# Patient Record
(demographics unavailable — no encounter records)

---

## 2024-10-18 NOTE — BEGINNING OF VISIT
[0] : 2) Feeling down, depressed, or hopeless: Not at all (0) [PHQ-2 Negative] : PHQ-2 Negative [Pain Scale: ___] : On a scale of 1-10, today the patient's pain is a(n) [unfilled]. [Never] : Never [With Patient/Caregiver] : with Patient/Caregiver

## 2024-10-24 NOTE — RESULTS/DATA
[FreeTextEntry1] : -PET/CT 10/4/19:  Right upper lobe mass, mediastinal and hilar lymph nodes appear significantly smaller and less intense than prior PET/CT from March 2019 and essentially unchanged from CT from June 2019.  There is a 5 mm right middle lobe nodule appearing more prominent than the prior study.  -CT Head 11/26/19:  No acute intracranial hemorrhage.  Vasogenic edema in the right frontoparietal region in area of known metastasis as seen on MR 10/1/2019.   -MRI Brain 11/27/19:  Enhancing lesion in the right frontal operculum just medial to the sylvian fissure is larger when compared to 10/1/2019 but is significantly smaller compared with 3/13/2019 and may represent neoplasm versus post therapy changes. No change tiny enhancing nodule left internal auditory canal.   -CT C/A/P 1/7/20:  Spiculated appearing mass in the right upper lobe, consistent with patient's known lung cancer. Comparison is made to a prior CT scan of the abdomen and pelvis which was performed on 2/18/2019. The sclerotic lesions in the left iliac bone were not seen on the prior exam. The sclerotic lesion in the right sacrum is also new. The lesion in the right iliac bone was seen previously.  The sclerotic lesion in the T11 vertebral body has significantly increased in size compared to the prior exam. These findings are concerning for progression of osseous metastasis.   -PET/CT 1/31/20:  Abnormal FDG-PET/CT scan.  1. Mildly FDG-avid spiculated right upper lobe lung mass is decreased in size and metabolism as compared to PET/CT dated 10/4/2019, and is not significantly changed as compared to CT dated 1/7/2020.  2. Resolution of small, mildly FDG-avid right paratracheal lymph node.  3. Non-FDG-avid sclerotic lesions, unchanged as compared to prior PET/CT, and interval diagnostic CT, are compatible with treated bone metastases.   -MRI Brain 2/27/20:  Similar-appearing enhancing right subinsular and right frontal opercular  lesions as detailed in the body the report. Decreased edema surrounding the larger right subinsular lesion. No abnormal leptomeningeal enhancement   -PET/CT 5/4/20:  Abnormal FDG-PET/CT scan.  1. Mildly FDG-avid spiculated right upper lobe lung mass is unchanged on CT, and minimally increased in metabolism as compared to prior study dated 1/31/2020. Etiology is indeterminate. Small focus of residual disease is not excluded.  2. New small hypermetabolic right perihilar and right paratracheal lymph nodes are nonspecific.  3. Non-FDG-avid sclerotic lesions, unchanged, compatible with treated bone metastases.   -MRI Brain 5/27/20:  Unchanged size of enhancing lesion in the right subinsular region with increased surrounding vasogenic edema. Continued close interval follow-up is recommended.   -MRI Brain 7/30/20:  Abnormal enhancing lesion with surrounding edema is again identified though decreased size and surrounding edema seen when compared prior exam. This finding could be compatible with response to therapy though continued close interval follow-up until resolution is recommended.   -PET/CT 8/10/20:  Compared to FDG-PET/CT scan dated 5/4/2020:  1. Mildly FDG-avid spiculated right upper lobe lung mass is unchanged in size and metabolism.  2. Nonspecific small FDG-avid mediastinal and bilateral hilar lymph nodes are unchanged or decreased in metabolism.  3. New approximately symmetric FDG activity in bilateral adrenal glands, without new corresponding abnormalities on CT, may be physiologic.  4. Non-FDG-avid sclerotic lesions, unchanged, compatible with treated bone metastases.   -CT Head 10/26/20:  No acute intracranial hemorrhage, mass effect, or evidence of acute vascular territorial infarction.  Previously described right frontal subinsular lesion is not well visualized on this examination.  More sensitive evaluation for intracranial metastatic disease, with contrast-enhanced brain MRI may be obtained, as clinically warranted, if no contraindications exist.  -MRI Brain 10/30/20:  Decreasing enhancement and surrounding FLAIR signal abnormality along the inferior right frontal lobe consistent with response to treatment. No new lesions are identified.  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    -PET/CT 11/13/20:  Compared to FDG-PET/CT scan dated 8/10/2020: 1. Mildly FDG-avid spiculated right upper lobe nodule is unchanged in size and metabolism. 2. Nonspecific small FDG-avid mediastinal and bilateral hilar lymph nodes, unchanged. 3. Mild, symmetric FDG activity in bilateral adrenal glands, without corresponding abnormalities on CT, unchanged likely physiologic. 4. New FDG-avid fractures, posterior aspect right 11th and 12th ribs. Correlate clinically for history of trauma. Few non-FDG-avid sclerotic lesions, unchanged, compatible with treated bone metastases.  -Abdominal U/S 12/4/20:  Etiology of patient's elevated LFTs is not elucidated on this study.  No hydronephrosis.  -PET/CT 2/12/21:  Compared to FDG-PET/CT scan dated 11/13/2020: 1. Mildly FDG-avid spiculated right upper lobe nodule is unchanged in size and metabolism, compatible with treated disease. 2. Few new small FDG-avid right axillary lymph nodes are nonspecific, probably reactive. Further evaluation may be performed with ultrasound. Nonspecific small FDG-avid mediastinal and bilateral hilar lymph nodes, unchanged in number and avidity. Right paratracheal node demonstrates new calcification. 3. Probable physiologic FDG activity, bilateral adrenal glands, unchanged. 4. Mild FDG activity associated with fractures in right 11th and 12th ribs, decreased as compared to prior study. Few non-FDG-avid sclerotic lesions, unchanged, compatible with treated bone metastases.  -MRI Brain 2/25/21:  Small focus of enhancement in the right frontal lobe without significant interval change from the most recent exam compatible with a treated metastasis.  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  -Pet/CT 6/21/21:  Compared to FDG-PET/CT scan dated 2/12/2021: 1. Mildly FDG avid spiculated right upper lobe nodule is unchanged in size and metabolism, compatible with treated disease. 2. Nonspecific new mild FDG avidity of bilateral level Ib cervical lymph nodes which have minimally increased in size. Please correlate clinically and with ultrasound for further evaluation as indicated. 3. Interval decrease in size and FDG avidity of nonspecific right axillary lymph nodes. Nonspecific small FDG avid mediastinal and bilateral hilar lymph nodes, unchanged in number and avidity. 4. Probable physiologic FDG activity, bilateral adrenal glands, not significantly changed. 5. Interval further decreased FDG activity associated with fractures in the right 11th-12th ribs. A few non-FDG avid sclerotic lesions, unchanged, compatible with treated bone metastasis. 6. Unchanged mild gastric hypermetabolism. Please correlate clinically for gastritis.  -EKG 6/25/21:  NSR with occasional PVC's   -MRI Brain 7/1/21: No new lesions are identified. Stable appearance of a previously treated metastatic lesion as described. Stable 3 mm enhancing lesion in the left internal auditory canal which likely represents a vestibular schwannoma.  -MRI C-spine 8/2/2021: Degenerative changes of the cervical spine with moderate central canal stenosis at C5-6.  No definite cord signal abnormality.  No abnormal enhancement or mass lesion seen.  -PET/CT 10/5/21:  Compared to FDG-PET/CT scan 6/21/2021: Findings suggestive of progression of disease. 1. Increased FDG avidity and unchanged size of spiculated right upper lobe pulmonary nodule. 2. New or increased FDG avid mediastinal and right hilar lymph nodes. 3. New FDG avidity associated with several unchanged small lytic and small sclerotic lesions in the pelvic bones. 4. Unchanged mild symmetrical FDG activity in bilateral adrenal glands, probably physiologic. 5. Unchanged mild gastric hypermetabolism, probably inflammatory. Please correlate clinically. 6. Unchanged nonspecific, mild fairly symmetric prominent FDG activity in the tonsillar regions, possibly inflammatory. Please correlate with direct visualization. Unchanged mildly FDG avid bilateral level Ib cervical lymph nodes, possibly reactive.  -MRI Brain 10/5/21:  There are 2 small enhancing lesions which are new since the prior exam of 7/1/2021 suspicious for metastasis. A 5 mm focus is in the right frontal lobe along the motor strip and a 3 mm focus is in the left frontal lobe in the subcortical white matter of the precentral gyrus.  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  -US Pelvis 11/3/21:  Nodular echogenic focus within the endometrium suspicious for polyp.  Unremarkable appearance of the ovaries  -[facility] 11/19/21:  No alterations identified.    -MRI Brain 12/5/21:  Abnormal enhancing lesions are again seen and slightly small in size which could be due to response to therapy. Continued close interval follow-up is recommended.  -CT Chest Angio 12/7/21 (COMPARED TO CT 1/7/20 AND NOT INTERVAL PET/CT'S):  No pulmonary embolus.  Right upper lobe mass similar to 01/07/2020.  Question of new metastasis to the right hilum.  Apparent skeletal metastases unchanged  -CT C/A/P 1/26/22:  Decreased size of a neoplasm in the right upper lobe.  Stable bone metastases. No finding to suggest new or progressive disease.  -Nuc Med Bone Scan 1/26/22:  Bone scan demonstrates:  No scan abnormality corresponding to sclerotic foci in the spine and pelvis.  FDG avid foci in the pelvis seen on prior PET/CT are not identified.  Degenerative disease in the major joints.  -MRI Brain 2/15/22:  Resolution of previously seen tiny metastatic foci within the precentral gyri.  Small enhancing focus within the right frontal operculum compatible with the sequelae of a treated metastasis, unchanged from the prior exams.  Stable left intracanalicular vestibular schwannoma.  -CT C/A/P 4/22/22:  Stable examination when compared to 1/26/2022, in particular stable spiculated mass in the right upper lobe at 1.5 cm.  Non specific 6 mm right lower paratracheal lymph node, attention on follow-up recommended.  Stable bony lesions concerning for metastatic disease.  No new metastatic disease.  -MRI Brain 5/14/22:   1. No evidence of recurrent metastatic disease. 2. A punctate enhancing focus and associated susceptibility artifact in the ventral posterior aspect of the right frontal lobe, compatible with sequela of treated metastasis, is stable. 3. 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  -CT C/A/P 7/22/22:  New patchy groundglass opacity in the right lower lobe, likely infectious/inflammatory. Enlarging indeterminate mediastinal and hilar lymphadenopathy. Follow-up chest CT in 3 months or as per clinical protocol.  Stable spiculated right apical nodule and osteosclerotic metastases.  No new disease in the abdomen or pelvis.  -[facility] 7/29/22:  No alterations identified.    -MRI Brain 8/19/22:  No evidence of intracranial metastatic disease. Resolution of previously seen precentral gyrus subcentimeter enhancing metastases. A focal area of T2 hyperintensity is seen in the region of the previously seen left-sided enhancing lesion likely representing the sequelae of prior radiation therapy. Continued follow-up is advised.  Stable left internal auditory canal vestibular schwannoma.  Stable area of enhancement with associated susceptibility artifact within the posterior right frontal lobe compatible with sequela of treated metastasis.  -CT C/A/P 10/21/22:  Since 7/22/2022:  Stable right upper lobe spiculated right upper lobe nodule.  Stable mildly enlarged hilar lymph nodes (although increased compared to 4/22/2022).  Stable sclerotic bone lesions.  No finding to suggest new or progressive disease.  -MRI Brain 11/23/22:   * STABLE VAGUE ENHANCEMENT, MILD INCREASED T2 SIGNAL, AND HEMOSIDERIN DEPOSITION POSTERIOR SUPERIOR ASPECT RIGHT INSULA AT SITE OF PRIOR TREATED METASTASIS. 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 * NO NEW ENHANCING LESIONS IDENTIFIED.  -CT C/A/P 1/20/23:  Ill-defined nodular opacity in the right upper lobe is unchanged when compared to previous exam.  No significant change in the sclerotic focus in the T11 vertebral body when compared to previous exam.  -MRI C/T-Spine 3/23/23:  No findings of osseous malignancy. No abnormal cord enhancement or cord signal abnormality.  -MRI L-Spine 3/23/23:  No findings of osseous or leptomeningeal malignancy.  -MRI Brain 3/23/23:   Similar minimal curvilinear enhancement along the posterior insula (35-92). Similar minimal associated T2 FLAIR signal abnormality.  Minimally decreased size of previously seen focus of enhancement in the distal left IAC, currently 2.5 mm, previously 3 mm.  No new abnormal parenchymal or leptomeningeal enhancement.  -CT C/A/P 5/5/23:  Stable exam. No new disease in the chest, abdomen or pelvis.  Unchanged spiculated right apical nodule, sub-4 mm left upper lobe nodules and multiple osteosclerotic lesions.  -MRI Brain 7/24/23:  Stable vague enhancement, mild increased T2 signal, and hemosiderin deposition posterior superior aspect right insula at site of prior treated metastasis. Stable 3 mm vestibular schwannoma fundus left IAC. No new enhancing lesions identified.  -CT C/A/P 8/18/23:  Stable exam. Unchanged 1.1 cm right apical nodule, few scattered bilateral sub-4 mm pulmonary nodules, and osteosclerotic lesions.  -MRI Brain 12/1/23:  No new areas of abnormal enhancement. Increase in size of the cortical focus of increased T2 FLAIR signal within the posterior aspect of the left frontal lobe at site of a prior treated metastasis. No associated abnormal enhancement. New 4 x 3 mm region of increased T2 signal posterior left frontal lobe near the vertex with associated decreased T1 signal and without associated enhancement. Attention to these areas on follow-up recommended.  -CT C/A/P 12/1/23:  Stable 1.2 cm right apical spiculated nodular density with stable additional less than 0.4 cm nodules. No new or enlarging pulmonary nodules or consolidations. Stable appearance of sclerotic lesions throughout the visualized osseous structures. No new evidence of metastatic disease. Abnormal appearing endometrium in a patient postmenopausal, recommend pelvic ultrasound.  -TV U/S 1/3/24:  Thickened, cystic endometrium measuring up to 1.0 cm. Gynecologic consultation for tissue sampling is recommended.  -Screening Mammo/Sono 1/25/24:  No mammographic or sonographic evidence of malignancy.  -CT C/A/P 4/2/24:  Interval disease stability within the chest, abdomen, and pelvis compared to 12/1/2023:  Right apical 1.3 cm irregular lesion and right lower lobe pulmonary micronodule unchanged. Sclerotic osseous lesions unchanged. Similar-appearing thickening of the endometrium.  -MRI Brain 5/2/24:   1. No acute intracranial abnormality. No findings suspicious for new intracranial metastases. 2. Unchanged left vestibular schwannoma. 3. Mild interval progression of overall very mild white matter disease which may be the sequelae of treatment effect, chronic microvascular ischemic disease, vasculitis, chronic migraines, and/or Lyme disease among other possibilities. 4. Nonspecific progression of an ovoid T1 hypointense focus in the medial left parietal lobe. Attention on follow-up exams is advised.  -CT CAP 8/10/24:  Stable exam, with unchanged 1.2 cm right apical nodule, punctate posterior right lower lobe nodule and osteosclerotic lesions. Unchanged indeterminate endometrial thickening. No new disease in the chest, abdomen or pelvis.  -MRI head w/wo con 8/15/24:  Minimal increase in size of multiple FLAIR hyperintense, nonenhancing lesions in the bilateral precentral gyri and right medial frontoparietal white matter. Spectroscopy evaluation of the left medial precentral gyrus lesion is nonspecific. Lesions that are amenable to perfusion evaluation demonstrate no hyperperfusion. Evolving posttreatment changes are considered but continued follow-up is recommended.

## 2024-10-24 NOTE — ASSESSMENT
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 She was treated with gamma knife RT to brain metastasis in March 2019. She started systemic therapy with Alectinib in April 2019 and achieved MN. Alectinib dose-reduced in late December 2020 due to renal function decline possibly due to anti-fungal agents for disseminated histoplasmosis. Started treatment with oral Posaconazole for histoplasmosis in mid-March 2021. Alectinib has been poorly tolerated in combination with Posaconazole, requiring dose-reductions of Alectinib. She was intolerant of the combination. Patient with sustained response on restaging PET/CT in June 2021. Alectinib was discontinued in late June 2021 due to poor tolerability in combination with posaconazole for disseminated histoplasmosis. Treated with 1 cycle of Pemetrexed chemotherapy on 7/9/21 with very poor tolerability. Further systemic therapy for lung cancer was held from July-October 2021. Restaging PET/CT October 2021 with disease progression. Restarted Alectinib 450mg BID on 10/9/21. She increased Alectinib to full dosing in late October 2021. Developed return of hemolysis in November and Alectinib permanently discontinued on 11/15/21 due to impression of drug-induced hemolytic anemia associated with this agent. Hemolysis resolved with discontinuation of offending agent (Alectinib). [facility] ct-DNA testing 11/19/21 with no evidence of ALK resistance mutations. Started 2nd line Brigatinib in late November 2021; achieved MN. Brain MRI Aug 2024 with suspected post-treatment changes.    Restaging CT C/A/P Aug 2024 with sustained response with regards to her cancer.   Recommend: -Continue treatment with 2nd line Brigatinib targeting her ALK rearrangement for as long as it benefits the patient, currently at 180mg PO daily. -F/U with GYN regarding repeat colposcopy planned for November  -Repeat labs today. Can monitor CEA; not clear if informative. Monitor CPK, amylase/lipase while on brigatinib.  No clear role/benefit for monitoring other tumor markers.   -Anemia:  monitor hemoglobin and periodic iron studies  -Questionable Histoplasmosis in BM s/p 1 year of antifungal therapy completed April 2022. Being monitored off antifungal; BM results possibly artifactual. Previously followed with ID. -F/U with Neuro-Oncologist. Titrated off Briviact. -F/U with Rad-Onc for management of Brain metastases. To have 6-month Brain MRI in Feb 2025. -Given the previous deep response in bone metastases, avoided the addition of a bone modifying agent -F/U with PMR PRN -F/U in November with restaging PET/CT obtained beforehand or sooner should problems arise.  PET/CT is necessary to evaluate extent of disease prior to consideration of local therapy/consolidative thoracic RT.  - Check out form given to patient with instructions for making appointments

## 2024-10-24 NOTE — PHYSICAL EXAM
[Fully active, able to carry on all pre-disease performance without restriction] : Status 0 - Fully active, able to carry on all pre-disease performance without restriction [Normal] : affect appropriate [de-identified] : No icterus  [de-identified] : Clear [de-identified] : No LAD [de-identified] : S1 S2 [de-identified] : No edema [de-identified] : No spine/CVA tenderness [de-identified] : Ambulatory

## 2024-10-24 NOTE — HISTORY OF PRESENT ILLNESS
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  She presented with symptomatic brain metastasis and was found to have a RUL primary tumor with intrathoracic LN, bone metastases, liver metastasis and brain metastasis.  Biopsy of the primary tumor in late Feb 2019 revealed her diagnosis.  She was treated with SRS to the brain in March 2019 with nice response.  She was started on first line Alectinib in April 2019 and achieved NH.    Patient is status post admission at [facility] from 11/27-12/4/2020 for treatment of disseminated histoplasmosis.  She was started on AmBisome with a plan to insert a PICC line however one of her blood cultures grew multiple organisms delaying the PICC line placement.  Subsequent fungal and bacterial cultures were negative.  During her hospital stay, she developed ANDREW which was felt to be secondary to the AmBisome however her alectinib was held during the hospital stay.  Abdominal ultrasound was unremarkable.  She was transfused 1 unit PRBCs prior to discharge.  She was discharged with plan to treat the histoplasmosis with oral posaconazole in the outpatient setting.  Patient has subsequently been on treatment with dose-reduced Alectinib 450mg BID since late December 2020 due to renal function.   She started Posaconazole in mid-March with the Alectinib.  Alectinib was stopped in late June 2021 due to poor tolerability with the combination.   Patient was started on Pemetrexed chemotherapy in July 2021 in effort to maintain control of her lung cancer while preserving sensitivity to ALK inhibitors and allowing her to be treated with Posaconazole for the disseminated histo.  She received 1 cycle of chemotherapy with Pemetrexed that was very poorly tolerated.  She completed roughly 1 year of Posaconazole in late April 2022.   She had repeat BM Bx in Sept 2021 showing evidence of persistent histoplasmosis and was continued on Posaconazole. Restaging PET/CT in early October with evidence of disease progression.  She restarted Alectinib 450mg BID on 10/9/21.   MRI Brain on 10/5/21 revealed two new small metastases; she had f/u with Rad-Onc and given her asymptomatic status coupled with restarting the Alectinib, the plan was to do a short-term f/u MRI at a 2 month interval in early December.    Developed return of hemolysis in November 2021.  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  Alectinib discontinued in mid-November 2021.   Started 2nd line Brigatinib in November 2021; achieved NH.  [facility] testing in July 2022 without any alterations.   [de-identified] : -Lung, RUL CT-guided FNA 2/26/19:  Positive for malignant cells.  Adenocarcinoma.  PDL1 positive at 5%.  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  [de-identified] : Patient continues on 2nd line Brigatinib since Nov 2021; achieved NH.   Patient is here with her  for follow-up. She feels well overall today and denies complaints.  She is planned for repeat brain MRI with Dr. Elkins in Feb 2025.  She continues on brigatinib. Patient had visited [facility] [place] for a consult to determine if she might be eligible to participate in a vaccine trial. Consult recommended PET/CT and tumor markers CEA, CA 27.29 and  and CA 19.9. We discussed that we have been monitoring CEA regularly. The CA 27.29 and  are more relevant for breast and ovarian cancer respectively, and the CA19.9 for GI cancer and do not have a clear role in her management; discussed how I did not see any of these results altering her management and may just provoke anxiety. A consideration of consolidative thoracic RT was entertained.

## 2024-10-24 NOTE — RESULTS/DATA
[FreeTextEntry1] : -PET/CT 10/4/19:  Right upper lobe mass, mediastinal and hilar lymph nodes appear significantly smaller and less intense than prior PET/CT from March 2019 and essentially unchanged from CT from June 2019.  There is a 5 mm right middle lobe nodule appearing more prominent than the prior study.  -CT Head 11/26/19:  No acute intracranial hemorrhage.  Vasogenic edema in the right frontoparietal region in area of known metastasis as seen on MR 10/1/2019.   -MRI Brain 11/27/19:  Enhancing lesion in the right frontal operculum just medial to the sylvian fissure is larger when compared to 10/1/2019 but is significantly smaller compared with 3/13/2019 and may represent neoplasm versus post therapy changes. No change tiny enhancing nodule left internal auditory canal.   -CT C/A/P 1/7/20:  Spiculated appearing mass in the right upper lobe, consistent with patient's known lung cancer. Comparison is made to a prior CT scan of the abdomen and pelvis which was performed on 2/18/2019. The sclerotic lesions in the left iliac bone were not seen on the prior exam. The sclerotic lesion in the right sacrum is also new. The lesion in the right iliac bone was seen previously.  The sclerotic lesion in the T11 vertebral body has significantly increased in size compared to the prior exam. These findings are concerning for progression of osseous metastasis.   -PET/CT 1/31/20:  Abnormal FDG-PET/CT scan.  1. Mildly FDG-avid spiculated right upper lobe lung mass is decreased in size and metabolism as compared to PET/CT dated 10/4/2019, and is not significantly changed as compared to CT dated 1/7/2020.  2. Resolution of small, mildly FDG-avid right paratracheal lymph node.  3. Non-FDG-avid sclerotic lesions, unchanged as compared to prior PET/CT, and interval diagnostic CT, are compatible with treated bone metastases.   -MRI Brain 2/27/20:  Similar-appearing enhancing right subinsular and right frontal opercular  lesions as detailed in the body the report. Decreased edema surrounding the larger right subinsular lesion. No abnormal leptomeningeal enhancement   -PET/CT 5/4/20:  Abnormal FDG-PET/CT scan.  1. Mildly FDG-avid spiculated right upper lobe lung mass is unchanged on CT, and minimally increased in metabolism as compared to prior study dated 1/31/2020. Etiology is indeterminate. Small focus of residual disease is not excluded.  2. New small hypermetabolic right perihilar and right paratracheal lymph nodes are nonspecific.  3. Non-FDG-avid sclerotic lesions, unchanged, compatible with treated bone metastases.   -MRI Brain 5/27/20:  Unchanged size of enhancing lesion in the right subinsular region with increased surrounding vasogenic edema. Continued close interval follow-up is recommended.   -MRI Brain 7/30/20:  Abnormal enhancing lesion with surrounding edema is again identified though decreased size and surrounding edema seen when compared prior exam. This finding could be compatible with response to therapy though continued close interval follow-up until resolution is recommended.   -PET/CT 8/10/20:  Compared to FDG-PET/CT scan dated 5/4/2020:  1. Mildly FDG-avid spiculated right upper lobe lung mass is unchanged in size and metabolism.  2. Nonspecific small FDG-avid mediastinal and bilateral hilar lymph nodes are unchanged or decreased in metabolism.  3. New approximately symmetric FDG activity in bilateral adrenal glands, without new corresponding abnormalities on CT, may be physiologic.  4. Non-FDG-avid sclerotic lesions, unchanged, compatible with treated bone metastases.   -CT Head 10/26/20:  No acute intracranial hemorrhage, mass effect, or evidence of acute vascular territorial infarction.  Previously described right frontal subinsular lesion is not well visualized on this examination.  More sensitive evaluation for intracranial metastatic disease, with contrast-enhanced brain MRI may be obtained, as clinically warranted, if no contraindications exist.  -MRI Brain 10/30/20:  Decreasing enhancement and surrounding FLAIR signal abnormality along the inferior right frontal lobe consistent with response to treatment. No new lesions are identified.  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    -PET/CT 11/13/20:  Compared to FDG-PET/CT scan dated 8/10/2020: 1. Mildly FDG-avid spiculated right upper lobe nodule is unchanged in size and metabolism. 2. Nonspecific small FDG-avid mediastinal and bilateral hilar lymph nodes, unchanged. 3. Mild, symmetric FDG activity in bilateral adrenal glands, without corresponding abnormalities on CT, unchanged likely physiologic. 4. New FDG-avid fractures, posterior aspect right 11th and 12th ribs. Correlate clinically for history of trauma. Few non-FDG-avid sclerotic lesions, unchanged, compatible with treated bone metastases.  -Abdominal U/S 12/4/20:  Etiology of patient's elevated LFTs is not elucidated on this study.  No hydronephrosis.  -PET/CT 2/12/21:  Compared to FDG-PET/CT scan dated 11/13/2020: 1. Mildly FDG-avid spiculated right upper lobe nodule is unchanged in size and metabolism, compatible with treated disease. 2. Few new small FDG-avid right axillary lymph nodes are nonspecific, probably reactive. Further evaluation may be performed with ultrasound. Nonspecific small FDG-avid mediastinal and bilateral hilar lymph nodes, unchanged in number and avidity. Right paratracheal node demonstrates new calcification. 3. Probable physiologic FDG activity, bilateral adrenal glands, unchanged. 4. Mild FDG activity associated with fractures in right 11th and 12th ribs, decreased as compared to prior study. Few non-FDG-avid sclerotic lesions, unchanged, compatible with treated bone metastases.  -MRI Brain 2/25/21:  Small focus of enhancement in the right frontal lobe without significant interval change from the most recent exam compatible with a treated metastasis.  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  -Pet/CT 6/21/21:  Compared to FDG-PET/CT scan dated 2/12/2021: 1. Mildly FDG avid spiculated right upper lobe nodule is unchanged in size and metabolism, compatible with treated disease. 2. Nonspecific new mild FDG avidity of bilateral level Ib cervical lymph nodes which have minimally increased in size. Please correlate clinically and with ultrasound for further evaluation as indicated. 3. Interval decrease in size and FDG avidity of nonspecific right axillary lymph nodes. Nonspecific small FDG avid mediastinal and bilateral hilar lymph nodes, unchanged in number and avidity. 4. Probable physiologic FDG activity, bilateral adrenal glands, not significantly changed. 5. Interval further decreased FDG activity associated with fractures in the right 11th-12th ribs. A few non-FDG avid sclerotic lesions, unchanged, compatible with treated bone metastasis. 6. Unchanged mild gastric hypermetabolism. Please correlate clinically for gastritis.  -EKG 6/25/21:  NSR with occasional PVC's   -MRI Brain 7/1/21: No new lesions are identified. Stable appearance of a previously treated metastatic lesion as described. Stable 3 mm enhancing lesion in the left internal auditory canal which likely represents a vestibular schwannoma.  -MRI C-spine 8/2/2021: Degenerative changes of the cervical spine with moderate central canal stenosis at C5-6.  No definite cord signal abnormality.  No abnormal enhancement or mass lesion seen.  -PET/CT 10/5/21:  Compared to FDG-PET/CT scan 6/21/2021: Findings suggestive of progression of disease. 1. Increased FDG avidity and unchanged size of spiculated right upper lobe pulmonary nodule. 2. New or increased FDG avid mediastinal and right hilar lymph nodes. 3. New FDG avidity associated with several unchanged small lytic and small sclerotic lesions in the pelvic bones. 4. Unchanged mild symmetrical FDG activity in bilateral adrenal glands, probably physiologic. 5. Unchanged mild gastric hypermetabolism, probably inflammatory. Please correlate clinically. 6. Unchanged nonspecific, mild fairly symmetric prominent FDG activity in the tonsillar regions, possibly inflammatory. Please correlate with direct visualization. Unchanged mildly FDG avid bilateral level Ib cervical lymph nodes, possibly reactive.  -MRI Brain 10/5/21:  There are 2 small enhancing lesions which are new since the prior exam of 7/1/2021 suspicious for metastasis. A 5 mm focus is in the right frontal lobe along the motor strip and a 3 mm focus is in the left frontal lobe in the subcortical white matter of the precentral gyrus.  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  -US Pelvis 11/3/21:  Nodular echogenic focus within the endometrium suspicious for polyp.  Unremarkable appearance of the ovaries  -[facility] 11/19/21:  No alterations identified.    -MRI Brain 12/5/21:  Abnormal enhancing lesions are again seen and slightly small in size which could be due to response to therapy. Continued close interval follow-up is recommended.  -CT Chest Angio 12/7/21 (COMPARED TO CT 1/7/20 AND NOT INTERVAL PET/CT'S):  No pulmonary embolus.  Right upper lobe mass similar to 01/07/2020.  Question of new metastasis to the right hilum.  Apparent skeletal metastases unchanged  -CT C/A/P 1/26/22:  Decreased size of a neoplasm in the right upper lobe.  Stable bone metastases. No finding to suggest new or progressive disease.  -Nuc Med Bone Scan 1/26/22:  Bone scan demonstrates:  No scan abnormality corresponding to sclerotic foci in the spine and pelvis.  FDG avid foci in the pelvis seen on prior PET/CT are not identified.  Degenerative disease in the major joints.  -MRI Brain 2/15/22:  Resolution of previously seen tiny metastatic foci within the precentral gyri.  Small enhancing focus within the right frontal operculum compatible with the sequelae of a treated metastasis, unchanged from the prior exams.  Stable left intracanalicular vestibular schwannoma.  -CT C/A/P 4/22/22:  Stable examination when compared to 1/26/2022, in particular stable spiculated mass in the right upper lobe at 1.5 cm.  Non specific 6 mm right lower paratracheal lymph node, attention on follow-up recommended.  Stable bony lesions concerning for metastatic disease.  No new metastatic disease.  -MRI Brain 5/14/22:   1. No evidence of recurrent metastatic disease. 2. A punctate enhancing focus and associated susceptibility artifact in the ventral posterior aspect of the right frontal lobe, compatible with sequela of treated metastasis, is stable. 3. 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  -CT C/A/P 7/22/22:  New patchy groundglass opacity in the right lower lobe, likely infectious/inflammatory. Enlarging indeterminate mediastinal and hilar lymphadenopathy. Follow-up chest CT in 3 months or as per clinical protocol.  Stable spiculated right apical nodule and osteosclerotic metastases.  No new disease in the abdomen or pelvis.  -[facility] 7/29/22:  No alterations identified.    -MRI Brain 8/19/22:  No evidence of intracranial metastatic disease. Resolution of previously seen precentral gyrus subcentimeter enhancing metastases. A focal area of T2 hyperintensity is seen in the region of the previously seen left-sided enhancing lesion likely representing the sequelae of prior radiation therapy. Continued follow-up is advised.  Stable left internal auditory canal vestibular schwannoma.  Stable area of enhancement with associated susceptibility artifact within the posterior right frontal lobe compatible with sequela of treated metastasis.  -CT C/A/P 10/21/22:  Since 7/22/2022:  Stable right upper lobe spiculated right upper lobe nodule.  Stable mildly enlarged hilar lymph nodes (although increased compared to 4/22/2022).  Stable sclerotic bone lesions.  No finding to suggest new or progressive disease.  -MRI Brain 11/23/22:   * STABLE VAGUE ENHANCEMENT, MILD INCREASED T2 SIGNAL, AND HEMOSIDERIN DEPOSITION POSTERIOR SUPERIOR ASPECT RIGHT INSULA AT SITE OF PRIOR TREATED METASTASIS. 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 * NO NEW ENHANCING LESIONS IDENTIFIED.  -CT C/A/P 1/20/23:  Ill-defined nodular opacity in the right upper lobe is unchanged when compared to previous exam.  No significant change in the sclerotic focus in the T11 vertebral body when compared to previous exam.  -MRI C/T-Spine 3/23/23:  No findings of osseous malignancy. No abnormal cord enhancement or cord signal abnormality.  -MRI L-Spine 3/23/23:  No findings of osseous or leptomeningeal malignancy.  -MRI Brain 3/23/23:   Similar minimal curvilinear enhancement along the posterior insula ([id number]). Similar minimal associated T2 FLAIR signal abnormality.  Minimally decreased size of previously seen focus of enhancement in the distal left IAC, currently 2.5 mm, previously 3 mm.  No new abnormal parenchymal or leptomeningeal enhancement.  -CT C/A/P 5/5/23:  Stable exam. No new disease in the chest, abdomen or pelvis.  Unchanged spiculated right apical nodule, sub-4 mm left upper lobe nodules and multiple osteosclerotic lesions.  -MRI Brain 7/24/23:  Stable vague enhancement, mild increased T2 signal, and hemosiderin deposition posterior superior aspect right insula at site of prior treated metastasis. Stable 3 mm vestibular schwannoma fundus left IAC. No new enhancing lesions identified.  -CT C/A/P 8/18/23:  Stable exam. Unchanged 1.1 cm right apical nodule, few scattered bilateral sub-4 mm pulmonary nodules, and osteosclerotic lesions.  -MRI Brain 12/1/23:  No new areas of abnormal enhancement. Increase in size of the cortical focus of increased T2 FLAIR signal within the posterior aspect of the left frontal lobe at site of a prior treated metastasis. No associated abnormal enhancement. New 4 x 3 mm region of increased T2 signal posterior left frontal lobe near the vertex with associated decreased T1 signal and without associated enhancement. Attention to these areas on follow-up recommended.  -CT C/A/P 12/1/23:  Stable 1.2 cm right apical spiculated nodular density with stable additional less than 0.4 cm nodules. No new or enlarging pulmonary nodules or consolidations. Stable appearance of sclerotic lesions throughout the visualized osseous structures. No new evidence of metastatic disease. Abnormal appearing endometrium in a patient postmenopausal, recommend pelvic ultrasound.  -TV U/S 1/3/24:  Thickened, cystic endometrium measuring up to 1.0 cm. Gynecologic consultation for tissue sampling is recommended.  -Screening Mammo/Sono 1/25/24:  No mammographic or sonographic evidence of malignancy.  -CT C/A/P 4/2/24:  Interval disease stability within the chest, abdomen, and pelvis compared to 12/1/2023:  Right apical 1.3 cm irregular lesion and right lower lobe pulmonary micronodule unchanged. Sclerotic osseous lesions unchanged. Similar-appearing thickening of the endometrium.  -MRI Brain 5/2/24:   1. No acute intracranial abnormality. No findings suspicious for new intracranial metastases. 2. Unchanged left vestibular schwannoma. 3. Mild interval progression of overall very mild white matter disease which may be the sequelae of treatment effect, chronic microvascular ischemic disease, vasculitis, chronic migraines, and/or Lyme disease among other possibilities. 4. Nonspecific progression of an ovoid T1 hypointense focus in the medial left parietal lobe. Attention on follow-up exams is advised.  -CT CAP 8/10/24:  Stable exam, with unchanged 1.2 cm right apical nodule, punctate posterior right lower lobe nodule and osteosclerotic lesions. Unchanged indeterminate endometrial thickening. No new disease in the chest, abdomen or pelvis.  -MRI head w/wo con 8/15/24:  Minimal increase in size of multiple FLAIR hyperintense, nonenhancing lesions in the bilateral precentral gyri and right medial frontoparietal white matter. Spectroscopy evaluation of the left medial precentral gyrus lesion is nonspecific. Lesions that are amenable to perfusion evaluation demonstrate no hyperperfusion. Evolving posttreatment changes are considered but continued follow-up is recommended.

## 2024-10-24 NOTE — PHYSICAL EXAM
[Fully active, able to carry on all pre-disease performance without restriction] : Status 0 - Fully active, able to carry on all pre-disease performance without restriction [Normal] : affect appropriate [de-identified] : No icterus  [de-identified] : Clear [de-identified] : No LAD [de-identified] : S1 S2 [de-identified] : No edema [de-identified] : No spine/CVA tenderness [de-identified] : Ambulatory

## 2024-10-24 NOTE — ASSESSMENT
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 She was treated with gamma knife RT to brain metastasis in March 2019. She started systemic therapy with Alectinib in April 2019 and achieved NC. Alectinib dose-reduced in late December 2020 due to renal function decline possibly due to anti-fungal agents for disseminated histoplasmosis. Started treatment with oral Posaconazole for histoplasmosis in mid-March 2021. Alectinib has been poorly tolerated in combination with Posaconazole, requiring dose-reductions of Alectinib. She was intolerant of the combination. Patient with sustained response on restaging PET/CT in June 2021. Alectinib was discontinued in late June 2021 due to poor tolerability in combination with posaconazole for disseminated histoplasmosis. Treated with 1 cycle of Pemetrexed chemotherapy on 7/9/21 with very poor tolerability. Further systemic therapy for lung cancer was held from July-October 2021. Restaging PET/CT October 2021 with disease progression. Restarted Alectinib 450mg BID on 10/9/21. She increased Alectinib to full dosing in late October 2021. Developed return of hemolysis in November and Alectinib permanently discontinued on 11/15/21 due to impression of drug-induced hemolytic anemia associated with this agent. Hemolysis resolved with discontinuation of offending agent (Alectinib). [facility] ct-DNA testing 11/19/21 with no evidence of ALK resistance mutations. Started 2nd line Brigatinib in late November 2021; achieved NC. Brain MRI Aug 2024 with suspected post-treatment changes.    Restaging CT C/A/P Aug 2024 with sustained response with regards to her cancer.   Recommend: -Continue treatment with 2nd line Brigatinib targeting her ALK rearrangement for as long as it benefits the patient, currently at 180mg PO daily. -F/U with GYN regarding repeat colposcopy planned for November  -Repeat labs today. Can monitor CEA; not clear if informative. Monitor CPK, amylase/lipase while on brigatinib.  No clear role/benefit for monitoring other tumor markers.   -Anemia:  monitor hemoglobin and periodic iron studies  -Questionable Histoplasmosis in BM s/p 1 year of antifungal therapy completed April 2022. Being monitored off antifungal; BM results possibly artifactual. Previously followed with ID. -F/U with Neuro-Oncologist. Titrated off Briviact. -F/U with Rad-Onc for management of Brain metastases. To have 6-month Brain MRI in Feb 2025. -Given the previous deep response in bone metastases, avoided the addition of a bone modifying agent -F/U with PMR PRN -F/U in November with restaging PET/CT obtained beforehand or sooner should problems arise.  PET/CT is necessary to evaluate extent of disease prior to consideration of local therapy/consolidative thoracic RT.  - Check out form given to patient with instructions for making appointments

## 2024-10-24 NOTE — RESULTS/DATA
[FreeTextEntry1] : -PET/CT 10/4/19:  Right upper lobe mass, mediastinal and hilar lymph nodes appear significantly smaller and less intense than prior PET/CT from March 2019 and essentially unchanged from CT from June 2019.  There is a 5 mm right middle lobe nodule appearing more prominent than the prior study.  -CT Head 11/26/19:  No acute intracranial hemorrhage.  Vasogenic edema in the right frontoparietal region in area of known metastasis as seen on MR 10/1/2019.   -MRI Brain 11/27/19:  Enhancing lesion in the right frontal operculum just medial to the sylvian fissure is larger when compared to 10/1/2019 but is significantly smaller compared with 3/13/2019 and may represent neoplasm versus post therapy changes. No change tiny enhancing nodule left internal auditory canal.   -CT C/A/P 1/7/20:  Spiculated appearing mass in the right upper lobe, consistent with patient's known lung cancer. Comparison is made to a prior CT scan of the abdomen and pelvis which was performed on 2/18/2019. The sclerotic lesions in the left iliac bone were not seen on the prior exam. The sclerotic lesion in the right sacrum is also new. The lesion in the right iliac bone was seen previously.  The sclerotic lesion in the T11 vertebral body has significantly increased in size compared to the prior exam. These findings are concerning for progression of osseous metastasis.   -PET/CT 1/31/20:  Abnormal FDG-PET/CT scan.  1. Mildly FDG-avid spiculated right upper lobe lung mass is decreased in size and metabolism as compared to PET/CT dated 10/4/2019, and is not significantly changed as compared to CT dated 1/7/2020.  2. Resolution of small, mildly FDG-avid right paratracheal lymph node.  3. Non-FDG-avid sclerotic lesions, unchanged as compared to prior PET/CT, and interval diagnostic CT, are compatible with treated bone metastases.   -MRI Brain 2/27/20:  Similar-appearing enhancing right subinsular and right frontal opercular  lesions as detailed in the body the report. Decreased edema surrounding the larger right subinsular lesion. No abnormal leptomeningeal enhancement   -PET/CT 5/4/20:  Abnormal FDG-PET/CT scan.  1. Mildly FDG-avid spiculated right upper lobe lung mass is unchanged on CT, and minimally increased in metabolism as compared to prior study dated 1/31/2020. Etiology is indeterminate. Small focus of residual disease is not excluded.  2. New small hypermetabolic right perihilar and right paratracheal lymph nodes are nonspecific.  3. Non-FDG-avid sclerotic lesions, unchanged, compatible with treated bone metastases.   -MRI Brain 5/27/20:  Unchanged size of enhancing lesion in the right subinsular region with increased surrounding vasogenic edema. Continued close interval follow-up is recommended.   -MRI Brain 7/30/20:  Abnormal enhancing lesion with surrounding edema is again identified though decreased size and surrounding edema seen when compared prior exam. This finding could be compatible with response to therapy though continued close interval follow-up until resolution is recommended.   -PET/CT 8/10/20:  Compared to FDG-PET/CT scan dated 5/4/2020:  1. Mildly FDG-avid spiculated right upper lobe lung mass is unchanged in size and metabolism.  2. Nonspecific small FDG-avid mediastinal and bilateral hilar lymph nodes are unchanged or decreased in metabolism.  3. New approximately symmetric FDG activity in bilateral adrenal glands, without new corresponding abnormalities on CT, may be physiologic.  4. Non-FDG-avid sclerotic lesions, unchanged, compatible with treated bone metastases.   -CT Head 10/26/20:  No acute intracranial hemorrhage, mass effect, or evidence of acute vascular territorial infarction.  Previously described right frontal subinsular lesion is not well visualized on this examination.  More sensitive evaluation for intracranial metastatic disease, with contrast-enhanced brain MRI may be obtained, as clinically warranted, if no contraindications exist.  -MRI Brain 10/30/20:  Decreasing enhancement and surrounding FLAIR signal abnormality along the inferior right frontal lobe consistent with response to treatment. No new lesions are identified.  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    -PET/CT 11/13/20:  Compared to FDG-PET/CT scan dated 8/10/2020: 1. Mildly FDG-avid spiculated right upper lobe nodule is unchanged in size and metabolism. 2. Nonspecific small FDG-avid mediastinal and bilateral hilar lymph nodes, unchanged. 3. Mild, symmetric FDG activity in bilateral adrenal glands, without corresponding abnormalities on CT, unchanged likely physiologic. 4. New FDG-avid fractures, posterior aspect right 11th and 12th ribs. Correlate clinically for history of trauma. Few non-FDG-avid sclerotic lesions, unchanged, compatible with treated bone metastases.  -Abdominal U/S 12/4/20:  Etiology of patient's elevated LFTs is not elucidated on this study.  No hydronephrosis.  -PET/CT 2/12/21:  Compared to FDG-PET/CT scan dated 11/13/2020: 1. Mildly FDG-avid spiculated right upper lobe nodule is unchanged in size and metabolism, compatible with treated disease. 2. Few new small FDG-avid right axillary lymph nodes are nonspecific, probably reactive. Further evaluation may be performed with ultrasound. Nonspecific small FDG-avid mediastinal and bilateral hilar lymph nodes, unchanged in number and avidity. Right paratracheal node demonstrates new calcification. 3. Probable physiologic FDG activity, bilateral adrenal glands, unchanged. 4. Mild FDG activity associated with fractures in right 11th and 12th ribs, decreased as compared to prior study. Few non-FDG-avid sclerotic lesions, unchanged, compatible with treated bone metastases.  -MRI Brain 2/25/21:  Small focus of enhancement in the right frontal lobe without significant interval change from the most recent exam compatible with a treated metastasis.  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  -Pet/CT 6/21/21:  Compared to FDG-PET/CT scan dated 2/12/2021: 1. Mildly FDG avid spiculated right upper lobe nodule is unchanged in size and metabolism, compatible with treated disease. 2. Nonspecific new mild FDG avidity of bilateral level Ib cervical lymph nodes which have minimally increased in size. Please correlate clinically and with ultrasound for further evaluation as indicated. 3. Interval decrease in size and FDG avidity of nonspecific right axillary lymph nodes. Nonspecific small FDG avid mediastinal and bilateral hilar lymph nodes, unchanged in number and avidity. 4. Probable physiologic FDG activity, bilateral adrenal glands, not significantly changed. 5. Interval further decreased FDG activity associated with fractures in the right 11th-12th ribs. A few non-FDG avid sclerotic lesions, unchanged, compatible with treated bone metastasis. 6. Unchanged mild gastric hypermetabolism. Please correlate clinically for gastritis.  -EKG 6/25/21:  NSR with occasional PVC's   -MRI Brain 7/1/21: No new lesions are identified. Stable appearance of a previously treated metastatic lesion as described. Stable 3 mm enhancing lesion in the left internal auditory canal which likely represents a vestibular schwannoma.  -MRI C-spine 8/2/2021: Degenerative changes of the cervical spine with moderate central canal stenosis at C5-6.  No definite cord signal abnormality.  No abnormal enhancement or mass lesion seen.  -PET/CT 10/5/21:  Compared to FDG-PET/CT scan 6/21/2021: Findings suggestive of progression of disease. 1. Increased FDG avidity and unchanged size of spiculated right upper lobe pulmonary nodule. 2. New or increased FDG avid mediastinal and right hilar lymph nodes. 3. New FDG avidity associated with several unchanged small lytic and small sclerotic lesions in the pelvic bones. 4. Unchanged mild symmetrical FDG activity in bilateral adrenal glands, probably physiologic. 5. Unchanged mild gastric hypermetabolism, probably inflammatory. Please correlate clinically. 6. Unchanged nonspecific, mild fairly symmetric prominent FDG activity in the tonsillar regions, possibly inflammatory. Please correlate with direct visualization. Unchanged mildly FDG avid bilateral level Ib cervical lymph nodes, possibly reactive.  -MRI Brain 10/5/21:  There are 2 small enhancing lesions which are new since the prior exam of 7/1/2021 suspicious for metastasis. A 5 mm focus is in the right frontal lobe along the motor strip and a 3 mm focus is in the left frontal lobe in the subcortical white matter of the precentral gyrus.  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  -US Pelvis 11/3/21:  Nodular echogenic focus within the endometrium suspicious for polyp.  Unremarkable appearance of the ovaries  -[facility] 11/19/21:  No alterations identified.    -MRI Brain 12/5/21:  Abnormal enhancing lesions are again seen and slightly small in size which could be due to response to therapy. Continued close interval follow-up is recommended.  -CT Chest Angio 12/7/21 (COMPARED TO CT 1/7/20 AND NOT INTERVAL PET/CT'S):  No pulmonary embolus.  Right upper lobe mass similar to 01/07/2020.  Question of new metastasis to the right hilum.  Apparent skeletal metastases unchanged  -CT C/A/P 1/26/22:  Decreased size of a neoplasm in the right upper lobe.  Stable bone metastases. No finding to suggest new or progressive disease.  -Nuc Med Bone Scan 1/26/22:  Bone scan demonstrates:  No scan abnormality corresponding to sclerotic foci in the spine and pelvis.  FDG avid foci in the pelvis seen on prior PET/CT are not identified.  Degenerative disease in the major joints.  -MRI Brain 2/15/22:  Resolution of previously seen tiny metastatic foci within the precentral gyri.  Small enhancing focus within the right frontal operculum compatible with the sequelae of a treated metastasis, unchanged from the prior exams.  Stable left intracanalicular vestibular schwannoma.  -CT C/A/P 4/22/22:  Stable examination when compared to 1/26/2022, in particular stable spiculated mass in the right upper lobe at 1.5 cm.  Non specific 6 mm right lower paratracheal lymph node, attention on follow-up recommended.  Stable bony lesions concerning for metastatic disease.  No new metastatic disease.  -MRI Brain 5/14/22:   1. No evidence of recurrent metastatic disease. 2. A punctate enhancing focus and associated susceptibility artifact in the ventral posterior aspect of the right frontal lobe, compatible with sequela of treated metastasis, is stable. 3. 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  -CT C/A/P 7/22/22:  New patchy groundglass opacity in the right lower lobe, likely infectious/inflammatory. Enlarging indeterminate mediastinal and hilar lymphadenopathy. Follow-up chest CT in 3 months or as per clinical protocol.  Stable spiculated right apical nodule and osteosclerotic metastases.  No new disease in the abdomen or pelvis.  -[facility] 7/29/22:  No alterations identified.    -MRI Brain 8/19/22:  No evidence of intracranial metastatic disease. Resolution of previously seen precentral gyrus subcentimeter enhancing metastases. A focal area of T2 hyperintensity is seen in the region of the previously seen left-sided enhancing lesion likely representing the sequelae of prior radiation therapy. Continued follow-up is advised.  Stable left internal auditory canal vestibular schwannoma.  Stable area of enhancement with associated susceptibility artifact within the posterior right frontal lobe compatible with sequela of treated metastasis.  -CT C/A/P 10/21/22:  Since 7/22/2022:  Stable right upper lobe spiculated right upper lobe nodule.  Stable mildly enlarged hilar lymph nodes (although increased compared to 4/22/2022).  Stable sclerotic bone lesions.  No finding to suggest new or progressive disease.  -MRI Brain 11/23/22:   * STABLE VAGUE ENHANCEMENT, MILD INCREASED T2 SIGNAL, AND HEMOSIDERIN DEPOSITION POSTERIOR SUPERIOR ASPECT RIGHT INSULA AT SITE OF PRIOR TREATED METASTASIS. 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 * NO NEW ENHANCING LESIONS IDENTIFIED.  -CT C/A/P 1/20/23:  Ill-defined nodular opacity in the right upper lobe is unchanged when compared to previous exam.  No significant change in the sclerotic focus in the T11 vertebral body when compared to previous exam.  -MRI C/T-Spine 3/23/23:  No findings of osseous malignancy. No abnormal cord enhancement or cord signal abnormality.  -MRI L-Spine 3/23/23:  No findings of osseous or leptomeningeal malignancy.  -MRI Brain 3/23/23:   Similar minimal curvilinear enhancement along the posterior insula ([id number]). Similar minimal associated T2 FLAIR signal abnormality.  Minimally decreased size of previously seen focus of enhancement in the distal left IAC, currently 2.5 mm, previously 3 mm.  No new abnormal parenchymal or leptomeningeal enhancement.  -CT C/A/P 5/5/23:  Stable exam. No new disease in the chest, abdomen or pelvis.  Unchanged spiculated right apical nodule, sub-4 mm left upper lobe nodules and multiple osteosclerotic lesions.  -MRI Brain 7/24/23:  Stable vague enhancement, mild increased T2 signal, and hemosiderin deposition posterior superior aspect right insula at site of prior treated metastasis. Stable 3 mm vestibular schwannoma fundus left IAC. No new enhancing lesions identified.  -CT C/A/P 8/18/23:  Stable exam. Unchanged 1.1 cm right apical nodule, few scattered bilateral sub-4 mm pulmonary nodules, and osteosclerotic lesions.  -MRI Brain 12/1/23:  No new areas of abnormal enhancement. Increase in size of the cortical focus of increased T2 FLAIR signal within the posterior aspect of the left frontal lobe at site of a prior treated metastasis. No associated abnormal enhancement. New 4 x 3 mm region of increased T2 signal posterior left frontal lobe near the vertex with associated decreased T1 signal and without associated enhancement. Attention to these areas on follow-up recommended.  -CT C/A/P 12/1/23:  Stable 1.2 cm right apical spiculated nodular density with stable additional less than 0.4 cm nodules. No new or enlarging pulmonary nodules or consolidations. Stable appearance of sclerotic lesions throughout the visualized osseous structures. No new evidence of metastatic disease. Abnormal appearing endometrium in a patient postmenopausal, recommend pelvic ultrasound.  -TV U/S 1/3/24:  Thickened, cystic endometrium measuring up to 1.0 cm. Gynecologic consultation for tissue sampling is recommended.  -Screening Mammo/Sono 1/25/24:  No mammographic or sonographic evidence of malignancy.  -CT C/A/P 4/2/24:  Interval disease stability within the chest, abdomen, and pelvis compared to 12/1/2023:  Right apical 1.3 cm irregular lesion and right lower lobe pulmonary micronodule unchanged. Sclerotic osseous lesions unchanged. Similar-appearing thickening of the endometrium.  -MRI Brain 5/2/24:   1. No acute intracranial abnormality. No findings suspicious for new intracranial metastases. 2. Unchanged left vestibular schwannoma. 3. Mild interval progression of overall very mild white matter disease which may be the sequelae of treatment effect, chronic microvascular ischemic disease, vasculitis, chronic migraines, and/or Lyme disease among other possibilities. 4. Nonspecific progression of an ovoid T1 hypointense focus in the medial left parietal lobe. Attention on follow-up exams is advised.  -CT CAP 8/10/24:  Stable exam, with unchanged 1.2 cm right apical nodule, punctate posterior right lower lobe nodule and osteosclerotic lesions. Unchanged indeterminate endometrial thickening. No new disease in the chest, abdomen or pelvis.  -MRI head w/wo con 8/15/24:  Minimal increase in size of multiple FLAIR hyperintense, nonenhancing lesions in the bilateral precentral gyri and right medial frontoparietal white matter. Spectroscopy evaluation of the left medial precentral gyrus lesion is nonspecific. Lesions that are amenable to perfusion evaluation demonstrate no hyperperfusion. Evolving posttreatment changes are considered but continued follow-up is recommended.

## 2024-10-24 NOTE — PHYSICAL EXAM
[Fully active, able to carry on all pre-disease performance without restriction] : Status 0 - Fully active, able to carry on all pre-disease performance without restriction [Normal] : affect appropriate [de-identified] : No icterus  [de-identified] : Clear [de-identified] : No LAD [de-identified] : S1 S2 [de-identified] : No edema [de-identified] : No spine/CVA tenderness [de-identified] : Ambulatory

## 2024-10-24 NOTE — HISTORY OF PRESENT ILLNESS
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  She presented with symptomatic brain metastasis and was found to have a RUL primary tumor with intrathoracic LN, bone metastases, liver metastasis and brain metastasis.  Biopsy of the primary tumor in late Feb 2019 revealed her diagnosis.  She was treated with SRS to the brain in March 2019 with nice response.  She was started on first line Alectinib in April 2019 and achieved SC.    Patient is status post admission at [facility] from 11/27-12/4/2020 for treatment of disseminated histoplasmosis.  She was started on AmBisome with a plan to insert a PICC line however one of her blood cultures grew multiple organisms delaying the PICC line placement.  Subsequent fungal and bacterial cultures were negative.  During her hospital stay, she developed ANDREW which was felt to be secondary to the AmBisome however her alectinib was held during the hospital stay.  Abdominal ultrasound was unremarkable.  She was transfused 1 unit PRBCs prior to discharge.  She was discharged with plan to treat the histoplasmosis with oral posaconazole in the outpatient setting.  Patient has subsequently been on treatment with dose-reduced Alectinib 450mg BID since late December 2020 due to renal function.   She started Posaconazole in mid-March with the Alectinib.  Alectinib was stopped in late June 2021 due to poor tolerability with the combination.   Patient was started on Pemetrexed chemotherapy in July 2021 in effort to maintain control of her lung cancer while preserving sensitivity to ALK inhibitors and allowing her to be treated with Posaconazole for the disseminated histo.  She received 1 cycle of chemotherapy with Pemetrexed that was very poorly tolerated.  She completed roughly 1 year of Posaconazole in late April 2022.   She had repeat BM Bx in Sept 2021 showing evidence of persistent histoplasmosis and was continued on Posaconazole. Restaging PET/CT in early October with evidence of disease progression.  She restarted Alectinib 450mg BID on 10/9/21.   MRI Brain on 10/5/21 revealed two new small metastases; she had f/u with Rad-Onc and given her asymptomatic status coupled with restarting the Alectinib, the plan was to do a short-term f/u MRI at a 2 month interval in early December.    Developed return of hemolysis in November 2021.  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  Alectinib discontinued in mid-November 2021.   Started 2nd line Brigatinib in November 2021; achieved SC.  Health system testing in July 2022 without any alterations.   [de-identified] : -Lung, RUL CT-guided FNA 2/26/19:  Positive for malignant cells.  Adenocarcinoma.  PDL1 positive at 5%.  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  [de-identified] : Patient continues on 2nd line Brigatinib since Nov 2021; achieved NC.   Patient is here with her  for follow-up. She feels well overall today and denies complaints.  She is planned for repeat brain MRI with Dr. Elkins in Feb 2025.  She continues on brigatinib. Patient had visited [facility] for a consult to determine if she might be eligible to participate in a vaccine trial. Consult recommended PET/CT and tumor markers CEA, CA 27.29 and  and CA 19.9. We discussed that we have been monitoring CEA regularly. The CA 27.29 and  are more relevant for breast and ovarian cancer respectively, and the CA19.9 for GI cancer and do not have a clear role in her management; discussed how I did not see any of these results altering her management and may just provoke anxiety. A consideration of consolidative thoracic RT was entertained.

## 2024-10-24 NOTE — RESULTS/DATA
[FreeTextEntry1] : -PET/CT 10/4/19:  Right upper lobe mass, mediastinal and hilar lymph nodes appear significantly smaller and less intense than prior PET/CT from March 2019 and essentially unchanged from CT from June 2019.  There is a 5 mm right middle lobe nodule appearing more prominent than the prior study.  -CT Head 11/26/19:  No acute intracranial hemorrhage.  Vasogenic edema in the right frontoparietal region in area of known metastasis as seen on MR 10/1/2019.   -MRI Brain 11/27/19:  Enhancing lesion in the right frontal operculum just medial to the sylvian fissure is larger when compared to 10/1/2019 but is significantly smaller compared with 3/13/2019 and may represent neoplasm versus post therapy changes. No change tiny enhancing nodule left internal auditory canal.   -CT C/A/P 1/7/20:  Spiculated appearing mass in the right upper lobe, consistent with patient's known lung cancer. Comparison is made to a prior CT scan of the abdomen and pelvis which was performed on 2/18/2019. The sclerotic lesions in the left iliac bone were not seen on the prior exam. The sclerotic lesion in the right sacrum is also new. The lesion in the right iliac bone was seen previously.  The sclerotic lesion in the T11 vertebral body has significantly increased in size compared to the prior exam. These findings are concerning for progression of osseous metastasis.   -PET/CT 1/31/20:  Abnormal FDG-PET/CT scan.  1. Mildly FDG-avid spiculated right upper lobe lung mass is decreased in size and metabolism as compared to PET/CT dated 10/4/2019, and is not significantly changed as compared to CT dated 1/7/2020.  2. Resolution of small, mildly FDG-avid right paratracheal lymph node.  3. Non-FDG-avid sclerotic lesions, unchanged as compared to prior PET/CT, and interval diagnostic CT, are compatible with treated bone metastases.   -MRI Brain 2/27/20:  Similar-appearing enhancing right subinsular and right frontal opercular  lesions as detailed in the body the report. Decreased edema surrounding the larger right subinsular lesion. No abnormal leptomeningeal enhancement   -PET/CT 5/4/20:  Abnormal FDG-PET/CT scan.  1. Mildly FDG-avid spiculated right upper lobe lung mass is unchanged on CT, and minimally increased in metabolism as compared to prior study dated 1/31/2020. Etiology is indeterminate. Small focus of residual disease is not excluded.  2. New small hypermetabolic right perihilar and right paratracheal lymph nodes are nonspecific.  3. Non-FDG-avid sclerotic lesions, unchanged, compatible with treated bone metastases.   -MRI Brain 5/27/20:  Unchanged size of enhancing lesion in the right subinsular region with increased surrounding vasogenic edema. Continued close interval follow-up is recommended.   -MRI Brain 7/30/20:  Abnormal enhancing lesion with surrounding edema is again identified though decreased size and surrounding edema seen when compared prior exam. This finding could be compatible with response to therapy though continued close interval follow-up until resolution is recommended.   -PET/CT 8/10/20:  Compared to FDG-PET/CT scan dated 5/4/2020:  1. Mildly FDG-avid spiculated right upper lobe lung mass is unchanged in size and metabolism.  2. Nonspecific small FDG-avid mediastinal and bilateral hilar lymph nodes are unchanged or decreased in metabolism.  3. New approximately symmetric FDG activity in bilateral adrenal glands, without new corresponding abnormalities on CT, may be physiologic.  4. Non-FDG-avid sclerotic lesions, unchanged, compatible with treated bone metastases.   -CT Head 10/26/20:  No acute intracranial hemorrhage, mass effect, or evidence of acute vascular territorial infarction.  Previously described right frontal subinsular lesion is not well visualized on this examination.  More sensitive evaluation for intracranial metastatic disease, with contrast-enhanced brain MRI may be obtained, as clinically warranted, if no contraindications exist.  -MRI Brain 10/30/20:  Decreasing enhancement and surrounding FLAIR signal abnormality along the inferior right frontal lobe consistent with response to treatment. No new lesions are identified.  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    -PET/CT 11/13/20:  Compared to FDG-PET/CT scan dated 8/10/2020: 1. Mildly FDG-avid spiculated right upper lobe nodule is unchanged in size and metabolism. 2. Nonspecific small FDG-avid mediastinal and bilateral hilar lymph nodes, unchanged. 3. Mild, symmetric FDG activity in bilateral adrenal glands, without corresponding abnormalities on CT, unchanged likely physiologic. 4. New FDG-avid fractures, posterior aspect right 11th and 12th ribs. Correlate clinically for history of trauma. Few non-FDG-avid sclerotic lesions, unchanged, compatible with treated bone metastases.  -Abdominal U/S 12/4/20:  Etiology of patient's elevated LFTs is not elucidated on this study.  No hydronephrosis.  -PET/CT 2/12/21:  Compared to FDG-PET/CT scan dated 11/13/2020: 1. Mildly FDG-avid spiculated right upper lobe nodule is unchanged in size and metabolism, compatible with treated disease. 2. Few new small FDG-avid right axillary lymph nodes are nonspecific, probably reactive. Further evaluation may be performed with ultrasound. Nonspecific small FDG-avid mediastinal and bilateral hilar lymph nodes, unchanged in number and avidity. Right paratracheal node demonstrates new calcification. 3. Probable physiologic FDG activity, bilateral adrenal glands, unchanged. 4. Mild FDG activity associated with fractures in right 11th and 12th ribs, decreased as compared to prior study. Few non-FDG-avid sclerotic lesions, unchanged, compatible with treated bone metastases.  -MRI Brain 2/25/21:  Small focus of enhancement in the right frontal lobe without significant interval change from the most recent exam compatible with a treated metastasis.  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  -Pet/CT 6/21/21:  Compared to FDG-PET/CT scan dated 2/12/2021: 1. Mildly FDG avid spiculated right upper lobe nodule is unchanged in size and metabolism, compatible with treated disease. 2. Nonspecific new mild FDG avidity of bilateral level Ib cervical lymph nodes which have minimally increased in size. Please correlate clinically and with ultrasound for further evaluation as indicated. 3. Interval decrease in size and FDG avidity of nonspecific right axillary lymph nodes. Nonspecific small FDG avid mediastinal and bilateral hilar lymph nodes, unchanged in number and avidity. 4. Probable physiologic FDG activity, bilateral adrenal glands, not significantly changed. 5. Interval further decreased FDG activity associated with fractures in the right 11th-12th ribs. A few non-FDG avid sclerotic lesions, unchanged, compatible with treated bone metastasis. 6. Unchanged mild gastric hypermetabolism. Please correlate clinically for gastritis.  -EKG 6/25/21:  NSR with occasional PVC's   -MRI Brain 7/1/21: No new lesions are identified. Stable appearance of a previously treated metastatic lesion as described. Stable 3 mm enhancing lesion in the left internal auditory canal which likely represents a vestibular schwannoma.  -MRI C-spine 8/2/2021: Degenerative changes of the cervical spine with moderate central canal stenosis at C5-6.  No definite cord signal abnormality.  No abnormal enhancement or mass lesion seen.  -PET/CT 10/5/21:  Compared to FDG-PET/CT scan 6/21/2021: Findings suggestive of progression of disease. 1. Increased FDG avidity and unchanged size of spiculated right upper lobe pulmonary nodule. 2. New or increased FDG avid mediastinal and right hilar lymph nodes. 3. New FDG avidity associated with several unchanged small lytic and small sclerotic lesions in the pelvic bones. 4. Unchanged mild symmetrical FDG activity in bilateral adrenal glands, probably physiologic. 5. Unchanged mild gastric hypermetabolism, probably inflammatory. Please correlate clinically. 6. Unchanged nonspecific, mild fairly symmetric prominent FDG activity in the tonsillar regions, possibly inflammatory. Please correlate with direct visualization. Unchanged mildly FDG avid bilateral level Ib cervical lymph nodes, possibly reactive.  -MRI Brain 10/5/21:  There are 2 small enhancing lesions which are new since the prior exam of 7/1/2021 suspicious for metastasis. A 5 mm focus is in the right frontal lobe along the motor strip and a 3 mm focus is in the left frontal lobe in the subcortical white matter of the precentral gyrus.  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  -US Pelvis 11/3/21:  Nodular echogenic focus within the endometrium suspicious for polyp.  Unremarkable appearance of the ovaries  -[facility] 11/19/21:  No alterations identified.    -MRI Brain 12/5/21:  Abnormal enhancing lesions are again seen and slightly small in size which could be due to response to therapy. Continued close interval follow-up is recommended.  -CT Chest Angio 12/7/21 (COMPARED TO CT 1/7/20 AND NOT INTERVAL PET/CT'S):  No pulmonary embolus.  Right upper lobe mass similar to 01/07/2020.  Question of new metastasis to the right hilum.  Apparent skeletal metastases unchanged  -CT C/A/P 1/26/22:  Decreased size of a neoplasm in the right upper lobe.  Stable bone metastases. No finding to suggest new or progressive disease.  -Nuc Med Bone Scan 1/26/22:  Bone scan demonstrates:  No scan abnormality corresponding to sclerotic foci in the spine and pelvis.  FDG avid foci in the pelvis seen on prior PET/CT are not identified.  Degenerative disease in the major joints.  -MRI Brain 2/15/22:  Resolution of previously seen tiny metastatic foci within the precentral gyri.  Small enhancing focus within the right frontal operculum compatible with the sequelae of a treated metastasis, unchanged from the prior exams.  Stable left intracanalicular vestibular schwannoma.  -CT C/A/P 4/22/22:  Stable examination when compared to 1/26/2022, in particular stable spiculated mass in the right upper lobe at 1.5 cm.  Non specific 6 mm right lower paratracheal lymph node, attention on follow-up recommended.  Stable bony lesions concerning for metastatic disease.  No new metastatic disease.  -MRI Brain 5/14/22:   1. No evidence of recurrent metastatic disease. 2. A punctate enhancing focus and associated susceptibility artifact in the ventral posterior aspect of the right frontal lobe, compatible with sequela of treated metastasis, is stable. 3. 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  -CT C/A/P 7/22/22:  New patchy groundglass opacity in the right lower lobe, likely infectious/inflammatory. Enlarging indeterminate mediastinal and hilar lymphadenopathy. Follow-up chest CT in 3 months or as per clinical protocol.  Stable spiculated right apical nodule and osteosclerotic metastases.  No new disease in the abdomen or pelvis.  -[facility] 7/29/22:  No alterations identified.    -MRI Brain 8/19/22:  No evidence of intracranial metastatic disease. Resolution of previously seen precentral gyrus subcentimeter enhancing metastases. A focal area of T2 hyperintensity is seen in the region of the previously seen left-sided enhancing lesion likely representing the sequelae of prior radiation therapy. Continued follow-up is advised.  Stable left internal auditory canal vestibular schwannoma.  Stable area of enhancement with associated susceptibility artifact within the posterior right frontal lobe compatible with sequela of treated metastasis.  -CT C/A/P 10/21/22:  Since 7/22/2022:  Stable right upper lobe spiculated right upper lobe nodule.  Stable mildly enlarged hilar lymph nodes (although increased compared to 4/22/2022).  Stable sclerotic bone lesions.  No finding to suggest new or progressive disease.  -MRI Brain 11/23/22:   * STABLE VAGUE ENHANCEMENT, MILD INCREASED T2 SIGNAL, AND HEMOSIDERIN DEPOSITION POSTERIOR SUPERIOR ASPECT RIGHT INSULA AT SITE OF PRIOR TREATED METASTASIS. 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 * NO NEW ENHANCING LESIONS IDENTIFIED.  -CT C/A/P 1/20/23:  Ill-defined nodular opacity in the right upper lobe is unchanged when compared to previous exam.  No significant change in the sclerotic focus in the T11 vertebral body when compared to previous exam.  -MRI C/T-Spine 3/23/23:  No findings of osseous malignancy. No abnormal cord enhancement or cord signal abnormality.  -MRI L-Spine 3/23/23:  No findings of osseous or leptomeningeal malignancy.  -MRI Brain 3/23/23:   Similar minimal curvilinear enhancement along the posterior insula ([id number]). Similar minimal associated T2 FLAIR signal abnormality.  Minimally decreased size of previously seen focus of enhancement in the distal left IAC, currently 2.5 mm, previously 3 mm.  No new abnormal parenchymal or leptomeningeal enhancement.  -CT C/A/P 5/5/23:  Stable exam. No new disease in the chest, abdomen or pelvis.  Unchanged spiculated right apical nodule, sub-4 mm left upper lobe nodules and multiple osteosclerotic lesions.  -MRI Brain 7/24/23:  Stable vague enhancement, mild increased T2 signal, and hemosiderin deposition posterior superior aspect right insula at site of prior treated metastasis. Stable 3 mm vestibular schwannoma fundus left IAC. No new enhancing lesions identified.  -CT C/A/P 8/18/23:  Stable exam. Unchanged 1.1 cm right apical nodule, few scattered bilateral sub-4 mm pulmonary nodules, and osteosclerotic lesions.  -MRI Brain 12/1/23:  No new areas of abnormal enhancement. Increase in size of the cortical focus of increased T2 FLAIR signal within the posterior aspect of the left frontal lobe at site of a prior treated metastasis. No associated abnormal enhancement. New 4 x 3 mm region of increased T2 signal posterior left frontal lobe near the vertex with associated decreased T1 signal and without associated enhancement. Attention to these areas on follow-up recommended.  -CT C/A/P 12/1/23:  Stable 1.2 cm right apical spiculated nodular density with stable additional less than 0.4 cm nodules. No new or enlarging pulmonary nodules or consolidations. Stable appearance of sclerotic lesions throughout the visualized osseous structures. No new evidence of metastatic disease. Abnormal appearing endometrium in a patient postmenopausal, recommend pelvic ultrasound.  -TV U/S 1/3/24:  Thickened, cystic endometrium measuring up to 1.0 cm. Gynecologic consultation for tissue sampling is recommended.  -Screening Mammo/Sono 1/25/24:  No mammographic or sonographic evidence of malignancy.  -CT C/A/P 4/2/24:  Interval disease stability within the chest, abdomen, and pelvis compared to 12/1/2023:  Right apical 1.3 cm irregular lesion and right lower lobe pulmonary micronodule unchanged. Sclerotic osseous lesions unchanged. Similar-appearing thickening of the endometrium.  -MRI Brain 5/2/24:   1. No acute intracranial abnormality. No findings suspicious for new intracranial metastases. 2. Unchanged left vestibular schwannoma. 3. Mild interval progression of overall very mild white matter disease which may be the sequelae of treatment effect, chronic microvascular ischemic disease, vasculitis, chronic migraines, and/or Lyme disease among other possibilities. 4. Nonspecific progression of an ovoid T1 hypointense focus in the medial left parietal lobe. Attention on follow-up exams is advised.  -CT CAP 8/10/24:  Stable exam, with unchanged 1.2 cm right apical nodule, punctate posterior right lower lobe nodule and osteosclerotic lesions. Unchanged indeterminate endometrial thickening. No new disease in the chest, abdomen or pelvis.  -MRI head w/wo con 8/15/24:  Minimal increase in size of multiple FLAIR hyperintense, nonenhancing lesions in the bilateral precentral gyri and right medial frontoparietal white matter. Spectroscopy evaluation of the left medial precentral gyrus lesion is nonspecific. Lesions that are amenable to perfusion evaluation demonstrate no hyperperfusion. Evolving posttreatment changes are considered but continued follow-up is recommended.

## 2024-10-24 NOTE — ASSESSMENT
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 She was treated with gamma knife RT to brain metastasis in March 2019. She started systemic therapy with Alectinib in April 2019 and achieved OR. Alectinib dose-reduced in late December 2020 due to renal function decline possibly due to anti-fungal agents for disseminated histoplasmosis. Started treatment with oral Posaconazole for histoplasmosis in mid-March 2021. Alectinib has been poorly tolerated in combination with Posaconazole, requiring dose-reductions of Alectinib. She was intolerant of the combination. Patient with sustained response on restaging PET/CT in June 2021. Alectinib was discontinued in late June 2021 due to poor tolerability in combination with posaconazole for disseminated histoplasmosis. Treated with 1 cycle of Pemetrexed chemotherapy on 7/9/21 with very poor tolerability. Further systemic therapy for lung cancer was held from July-October 2021. Restaging PET/CT October 2021 with disease progression. Restarted Alectinib 450mg BID on 10/9/21. She increased Alectinib to full dosing in late October 2021. Developed return of hemolysis in November and Alectinib permanently discontinued on 11/15/21 due to impression of drug-induced hemolytic anemia associated with this agent. Hemolysis resolved with discontinuation of offending agent (Alectinib). [facility] ct-DNA testing 11/19/21 with no evidence of ALK resistance mutations. Started 2nd line Brigatinib in late November 2021; achieved OR. Brain MRI Aug 2024 with suspected post-treatment changes.    Restaging CT C/A/P Aug 2024 with sustained response with regards to her cancer.   Recommend: -Continue treatment with 2nd line Brigatinib targeting her ALK rearrangement for as long as it benefits the patient, currently at 180mg PO daily. -F/U with GYN regarding repeat colposcopy planned for November  -Repeat labs today. Can monitor CEA; not clear if informative. Monitor CPK, amylase/lipase while on brigatinib.  No clear role/benefit for monitoring other tumor markers.   -Anemia:  monitor hemoglobin and periodic iron studies  -Questionable Histoplasmosis in BM s/p 1 year of antifungal therapy completed April 2022. Being monitored off antifungal; BM results possibly artifactual. Previously followed with ID. -F/U with Neuro-Oncologist. Titrated off Briviact. -F/U with Rad-Onc for management of Brain metastases. To have 6-month Brain MRI in Feb 2025. -Given the previous deep response in bone metastases, avoided the addition of a bone modifying agent -F/U with PMR PRN -F/U in November with restaging PET/CT obtained beforehand or sooner should problems arise.  PET/CT is necessary to evaluate extent of disease prior to consideration of local therapy/consolidative thoracic RT.  - Check out form given to patient with instructions for making appointments

## 2024-10-24 NOTE — HISTORY OF PRESENT ILLNESS
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  She presented with symptomatic brain metastasis and was found to have a RUL primary tumor with intrathoracic LN, bone metastases, liver metastasis and brain metastasis.  Biopsy of the primary tumor in late Feb 2019 revealed her diagnosis.  She was treated with SRS to the brain in March 2019 with nice response.  She was started on first line Alectinib in April 2019 and achieved IN.    Patient is status post admission at [facility] from 11/27-12/4/2020 for treatment of disseminated histoplasmosis.  She was started on AmBisome with a plan to insert a PICC line however one of her blood cultures grew multiple organisms delaying the PICC line placement.  Subsequent fungal and bacterial cultures were negative.  During her hospital stay, she developed ANDREW which was felt to be secondary to the AmBisome however her alectinib was held during the hospital stay.  Abdominal ultrasound was unremarkable.  She was transfused 1 unit PRBCs prior to discharge.  She was discharged with plan to treat the histoplasmosis with oral posaconazole in the outpatient setting.  Patient has subsequently been on treatment with dose-reduced Alectinib 450mg BID since late December 2020 due to renal function.   She started Posaconazole in mid-March with the Alectinib.  Alectinib was stopped in late June 2021 due to poor tolerability with the combination.   Patient was started on Pemetrexed chemotherapy in July 2021 in effort to maintain control of her lung cancer while preserving sensitivity to ALK inhibitors and allowing her to be treated with Posaconazole for the disseminated histo.  She received 1 cycle of chemotherapy with Pemetrexed that was very poorly tolerated.  She completed roughly 1 year of Posaconazole in late April 2022.   She had repeat BM Bx in Sept 2021 showing evidence of persistent histoplasmosis and was continued on Posaconazole. Restaging PET/CT in early October with evidence of disease progression.  She restarted Alectinib 450mg BID on 10/9/21.   MRI Brain on 10/5/21 revealed two new small metastases; she had f/u with Rad-Onc and given her asymptomatic status coupled with restarting the Alectinib, the plan was to do a short-term f/u MRI at a 2 month interval in early December.    Developed return of hemolysis in November 2021.  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  Alectinib discontinued in mid-November 2021.   Started 2nd line Brigatinib in November 2021; achieved IN.  [facility] testing in July 2022 without any alterations.   [de-identified] : -Lung, RUL CT-guided FNA 2/26/19:  Positive for malignant cells.  Adenocarcinoma.  PDL1 positive at 5%.  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  [de-identified] : Patient continues on 2nd line Brigatinib since Nov 2021; achieved RI.   Patient is here with her  for follow-up. She feels well overall today and denies complaints.  She is planned for repeat brain MRI with Dr. Elkins in Feb 2025.  She continues on brigatinib. Patient had visited [facility] for a consult to determine if she might be eligible to participate in a vaccine trial. Consult recommended PET/CT and tumor markers CEA, CA 27.29 and  and CA 19.9. We discussed that we have been monitoring CEA regularly. The CA 27.29 and  are more relevant for breast and ovarian cancer respectively, and the CA19.9 for GI cancer and do not have a clear role in her management; discussed how I did not see any of these results altering her management and may just provoke anxiety. A consideration of consolidative thoracic RT was entertained.

## 2024-10-24 NOTE — ASSESSMENT
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 She was treated with gamma knife RT to brain metastasis in March 2019. She started systemic therapy with Alectinib in April 2019 and achieved MA. Alectinib dose-reduced in late December 2020 due to renal function decline possibly due to anti-fungal agents for disseminated histoplasmosis. Started treatment with oral Posaconazole for histoplasmosis in mid-March 2021. Alectinib has been poorly tolerated in combination with Posaconazole, requiring dose-reductions of Alectinib. She was intolerant of the combination. Patient with sustained response on restaging PET/CT in June 2021. Alectinib was discontinued in late June 2021 due to poor tolerability in combination with posaconazole for disseminated histoplasmosis. Treated with 1 cycle of Pemetrexed chemotherapy on 7/9/21 with very poor tolerability. Further systemic therapy for lung cancer was held from July-October 2021. Restaging PET/CT October 2021 with disease progression. Restarted Alectinib 450mg BID on 10/9/21. She increased Alectinib to full dosing in late October 2021. Developed return of hemolysis in November and Alectinib permanently discontinued on 11/15/21 due to impression of drug-induced hemolytic anemia associated with this agent. Hemolysis resolved with discontinuation of offending agent (Alectinib). [facility] ct-DNA testing 11/19/21 with no evidence of ALK resistance mutations. Started 2nd line Brigatinib in late November 2021; achieved MA. Brain MRI Aug 2024 with suspected post-treatment changes.    Restaging CT C/A/P Aug 2024 with sustained response with regards to her cancer.   Recommend: -Continue treatment with 2nd line Brigatinib targeting her ALK rearrangement for as long as it benefits the patient, currently at 180mg PO daily. -F/U with GYN regarding repeat colposcopy planned for November  -Repeat labs today. Can monitor CEA; not clear if informative. Monitor CPK, amylase/lipase while on brigatinib.  No clear role/benefit for monitoring other tumor markers.   -Anemia:  monitor hemoglobin and periodic iron studies  -Questionable Histoplasmosis in BM s/p 1 year of antifungal therapy completed April 2022. Being monitored off antifungal; BM results possibly artifactual. Previously followed with ID. -F/U with Neuro-Oncologist. Titrated off Briviact. -F/U with Rad-Onc for management of Brain metastases. To have 6-month Brain MRI in Feb 2025. -Given the previous deep response in bone metastases, avoided the addition of a bone modifying agent -F/U with PMR PRN -F/U in November with restaging PET/CT obtained beforehand or sooner should problems arise.  PET/CT is necessary to evaluate extent of disease prior to consideration of local therapy/consolidative thoracic RT.  - Check out form given to patient with instructions for making appointments

## 2024-10-24 NOTE — PHYSICAL EXAM
[Fully active, able to carry on all pre-disease performance without restriction] : Status 0 - Fully active, able to carry on all pre-disease performance without restriction [Normal] : affect appropriate [de-identified] : No icterus  [de-identified] : Clear [de-identified] : No LAD [de-identified] : S1 S2 [de-identified] : No edema [de-identified] : No spine/CVA tenderness [de-identified] : Ambulatory

## 2024-12-05 NOTE — HISTORY OF PRESENT ILLNESS
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  She presented with symptomatic brain metastasis and was found to have a RUL primary tumor with intrathoracic LN, bone metastases, liver metastasis and brain metastasis.  Biopsy of the primary tumor in late Feb 2019 revealed her diagnosis.  She was treated with SRS to the brain in March 2019 with nice response.  She was started on first line Alectinib in April 2019 and achieved MI.    Patient is status post admission at [facility] from 11/27-12/4/2020 for treatment of disseminated histoplasmosis.  She was started on AmBisome with a plan to insert a PICC line however one of her blood cultures grew multiple organisms delaying the PICC line placement.  Subsequent fungal and bacterial cultures were negative.  During her hospital stay, she developed ANDREW which was felt to be secondary to the AmBisome however her alectinib was held during the hospital stay.  Abdominal ultrasound was unremarkable.  She was transfused 1 unit PRBCs prior to discharge.  She was discharged with plan to treat the histoplasmosis with oral posaconazole in the outpatient setting.  Patient has subsequently been on treatment with dose-reduced Alectinib 450mg BID since late December 2020 due to renal function.   She started Posaconazole in mid-March with the Alectinib.  Alectinib was stopped in late June 2021 due to poor tolerability with the combination.   Patient was started on Pemetrexed chemotherapy in July 2021 in effort to maintain control of her lung cancer while preserving sensitivity to ALK inhibitors and allowing her to be treated with Posaconazole for the disseminated histo.  She received 1 cycle of chemotherapy with Pemetrexed that was very poorly tolerated.  She completed roughly 1 year of Posaconazole in late April 2022.   She had repeat BM Bx in Sept 2021 showing evidence of persistent histoplasmosis and was continued on Posaconazole. Restaging PET/CT in early October with evidence of disease progression.  She restarted Alectinib 450mg BID on 10/9/21.   MRI Brain on 10/5/21 revealed two new small metastases; she had f/u with Rad-Onc and given her asymptomatic status coupled with restarting the Alectinib, the plan was to do a short-term f/u MRI at a 2 month interval in early December.    Developed return of hemolysis in November 2021.  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  Alectinib discontinued in mid-November 2021.   Started 2nd line Brigatinib in November 2021; achieved MI.  [facility] testing in July 2022 without any alterations.   [de-identified] : -Lung, RUL CT-guided FNA 2/26/19:  Positive for malignant cells.  Adenocarcinoma.  PDL1 positive at 5%.  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  [de-identified] : Patient continues on 2nd line Brigatinib since Nov 2021; achieved [place].   Patient presents for follow-up. She reports occasional myalgias.  Overall feeling quite well.  Denies F/C/N/V/D.  Restaging PET/CT 11/22/2024 reveals an overall sustained response.  There is note of increased avidity of a right sclerotic sacral bone lesion, however, this was compared to a prior PET/CT from 2021.  Reassurance provided and discussed that this does not appear to be significant or would alter her management.

## 2024-12-05 NOTE — RESULTS/DATA
[FreeTextEntry1] : -PET/CT 10/4/19:  Right upper lobe mass, mediastinal and hilar lymph nodes appear significantly smaller and less intense than prior PET/CT from March 2019 and essentially unchanged from CT from June 2019.  There is a 5 mm right middle lobe nodule appearing more prominent than the prior study.  -CT Head 11/26/19:  No acute intracranial hemorrhage.  Vasogenic edema in the right frontoparietal region in area of known metastasis as seen on MR 10/1/2019.   -MRI Brain 11/27/19:  Enhancing lesion in the right frontal operculum just medial to the sylvian fissure is larger when compared to 10/1/2019 but is significantly smaller compared with 3/13/2019 and may represent neoplasm versus post therapy changes. No change tiny enhancing nodule left internal auditory canal.   -CT C/A/P 1/7/20:  Spiculated appearing mass in the right upper lobe, consistent with patient's known lung cancer. Comparison is made to a prior CT scan of the abdomen and pelvis which was performed on 2/18/2019. The sclerotic lesions in the left iliac bone were not seen on the prior exam. The sclerotic lesion in the right sacrum is also new. The lesion in the right iliac bone was seen previously.  The sclerotic lesion in the T11 vertebral body has significantly increased in size compared to the prior exam. These findings are concerning for progression of osseous metastasis.   -PET/CT 1/31/20:  Abnormal FDG-PET/CT scan.  1. Mildly FDG-avid spiculated right upper lobe lung mass is decreased in size and metabolism as compared to PET/CT dated 10/4/2019, and is not significantly changed as compared to CT dated 1/7/2020.  2. Resolution of small, mildly FDG-avid right paratracheal lymph node.  3. Non-FDG-avid sclerotic lesions, unchanged as compared to prior PET/CT, and interval diagnostic CT, are compatible with treated bone metastases.   -MRI Brain 2/27/20:  Similar-appearing enhancing right subinsular and right frontal opercular  lesions as detailed in the body the report. Decreased edema surrounding the larger right subinsular lesion. No abnormal leptomeningeal enhancement   -PET/CT 5/4/20:  Abnormal FDG-PET/CT scan.  1. Mildly FDG-avid spiculated right upper lobe lung mass is unchanged on CT, and minimally increased in metabolism as compared to prior study dated 1/31/2020. Etiology is indeterminate. Small focus of residual disease is not excluded.  2. New small hypermetabolic right perihilar and right paratracheal lymph nodes are nonspecific.  3. Non-FDG-avid sclerotic lesions, unchanged, compatible with treated bone metastases.   -MRI Brain 5/27/20:  Unchanged size of enhancing lesion in the right subinsular region with increased surrounding vasogenic edema. Continued close interval follow-up is recommended.   -MRI Brain 7/30/20:  Abnormal enhancing lesion with surrounding edema is again identified though decreased size and surrounding edema seen when compared prior exam. This finding could be compatible with response to therapy though continued close interval follow-up until resolution is recommended.   -PET/CT 8/10/20:  Compared to FDG-PET/CT scan dated 5/4/2020:  1. Mildly FDG-avid spiculated right upper lobe lung mass is unchanged in size and metabolism.  2. Nonspecific small FDG-avid mediastinal and bilateral hilar lymph nodes are unchanged or decreased in metabolism.  3. New approximately symmetric FDG activity in bilateral adrenal glands, without new corresponding abnormalities on CT, may be physiologic.  4. Non-FDG-avid sclerotic lesions, unchanged, compatible with treated bone metastases.   -CT Head 10/26/20:  No acute intracranial hemorrhage, mass effect, or evidence of acute vascular territorial infarction.  Previously described right frontal subinsular lesion is not well visualized on this examination.  More sensitive evaluation for intracranial metastatic disease, with contrast-enhanced brain MRI may be obtained, as clinically warranted, if no contraindications exist.  -MRI Brain 10/30/20:  Decreasing enhancement and surrounding FLAIR signal abnormality along the inferior right frontal lobe consistent with response to treatment. No new lesions are identified.  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    -PET/CT 11/13/20:  Compared to FDG-PET/CT scan dated 8/10/2020: 1. Mildly FDG-avid spiculated right upper lobe nodule is unchanged in size and metabolism. 2. Nonspecific small FDG-avid mediastinal and bilateral hilar lymph nodes, unchanged. 3. Mild, symmetric FDG activity in bilateral adrenal glands, without corresponding abnormalities on CT, unchanged likely physiologic. 4. New FDG-avid fractures, posterior aspect right 11th and 12th ribs. Correlate clinically for history of trauma. Few non-FDG-avid sclerotic lesions, unchanged, compatible with treated bone metastases.  -Abdominal U/S 12/4/20:  Etiology of patient's elevated LFTs is not elucidated on this study.  No hydronephrosis.  -PET/CT 2/12/21:  Compared to FDG-PET/CT scan dated 11/13/2020: 1. Mildly FDG-avid spiculated right upper lobe nodule is unchanged in size and metabolism, compatible with treated disease. 2. Few new small FDG-avid right axillary lymph nodes are nonspecific, probably reactive. Further evaluation may be performed with ultrasound. Nonspecific small FDG-avid mediastinal and bilateral hilar lymph nodes, unchanged in number and avidity. Right paratracheal node demonstrates new calcification. 3. Probable physiologic FDG activity, bilateral adrenal glands, unchanged. 4. Mild FDG activity associated with fractures in right 11th and 12th ribs, decreased as compared to prior study. Few non-FDG-avid sclerotic lesions, unchanged, compatible with treated bone metastases.  -MRI Brain 2/25/21:  Small focus of enhancement in the right frontal lobe without significant interval change from the most recent exam compatible with a treated metastasis.  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  -Pet/CT 6/21/21:  Compared to FDG-PET/CT scan dated 2/12/2021: 1. Mildly FDG avid spiculated right upper lobe nodule is unchanged in size and metabolism, compatible with treated disease. 2. Nonspecific new mild FDG avidity of bilateral level Ib cervical lymph nodes which have minimally increased in size. Please correlate clinically and with ultrasound for further evaluation as indicated. 3. Interval decrease in size and FDG avidity of nonspecific right axillary lymph nodes. Nonspecific small FDG avid mediastinal and bilateral hilar lymph nodes, unchanged in number and avidity. 4. Probable physiologic FDG activity, bilateral adrenal glands, not significantly changed. 5. Interval further decreased FDG activity associated with fractures in the right 11th-12th ribs. A few non-FDG avid sclerotic lesions, unchanged, compatible with treated bone metastasis. 6. Unchanged mild gastric hypermetabolism. Please correlate clinically for gastritis.  -EKG 6/25/21:  NSR with occasional PVC's   -MRI Brain 7/1/21: No new lesions are identified. Stable appearance of a previously treated metastatic lesion as described. Stable 3 mm enhancing lesion in the left internal auditory canal which likely represents a vestibular schwannoma.  -MRI C-spine 8/2/2021: Degenerative changes of the cervical spine with moderate central canal stenosis at C5-6.  No definite cord signal abnormality.  No abnormal enhancement or mass lesion seen.  -PET/CT 10/5/21:  Compared to FDG-PET/CT scan 6/21/2021: Findings suggestive of progression of disease. 1. Increased FDG avidity and unchanged size of spiculated right upper lobe pulmonary nodule. 2. New or increased FDG avid mediastinal and right hilar lymph nodes. 3. New FDG avidity associated with several unchanged small lytic and small sclerotic lesions in the pelvic bones. 4. Unchanged mild symmetrical FDG activity in bilateral adrenal glands, probably physiologic. 5. Unchanged mild gastric hypermetabolism, probably inflammatory. Please correlate clinically. 6. Unchanged nonspecific, mild fairly symmetric prominent FDG activity in the tonsillar regions, possibly inflammatory. Please correlate with direct visualization. Unchanged mildly FDG avid bilateral level Ib cervical lymph nodes, possibly reactive.  -MRI Brain 10/5/21:  There are 2 small enhancing lesions which are new since the prior exam of 7/1/2021 suspicious for metastasis. A 5 mm focus is in the right frontal lobe along the motor strip and a 3 mm focus is in the left frontal lobe in the subcortical white matter of the precentral gyrus.  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  -US Pelvis 11/3/21:  Nodular echogenic focus within the endometrium suspicious for polyp.  Unremarkable appearance of the ovaries  -[facility] 11/19/21:  No alterations identified.    -MRI Brain 12/5/21:  Abnormal enhancing lesions are again seen and slightly small in size which could be due to response to therapy. Continued close interval follow-up is recommended.  -CT Chest Angio 12/7/21 (COMPARED TO CT 1/7/20 AND NOT INTERVAL PET/CT'S):  No pulmonary embolus.  Right upper lobe mass similar to 01/07/2020.  Question of new metastasis to the right hilum.  Apparent skeletal metastases unchanged  -CT C/A/P 1/26/22:  Decreased size of a neoplasm in the right upper lobe.  Stable bone metastases. No finding to suggest new or progressive disease.  -Nuc Med Bone Scan 1/26/22:  Bone scan demonstrates:  No scan abnormality corresponding to sclerotic foci in the spine and pelvis.  FDG avid foci in the pelvis seen on prior PET/CT are not identified.  Degenerative disease in the major joints.  -MRI Brain 2/15/22:  Resolution of previously seen tiny metastatic foci within the precentral gyri.  Small enhancing focus within the right frontal operculum compatible with the sequelae of a treated metastasis, unchanged from the prior exams.  Stable left intracanalicular vestibular schwannoma.  -CT C/A/P 4/22/22:  Stable examination when compared to 1/26/2022, in particular stable spiculated mass in the right upper lobe at 1.5 cm.  Non specific 6 mm right lower paratracheal lymph node, attention on follow-up recommended.  Stable bony lesions concerning for metastatic disease.  No new metastatic disease.  -MRI Brain 5/14/22:   1. No evidence of recurrent metastatic disease. 2. A punctate enhancing focus and associated susceptibility artifact in the ventral posterior aspect of the right frontal lobe, compatible with sequela of treated metastasis, is stable. 3. 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  -CT C/A/P 7/22/22:  New patchy groundglass opacity in the right lower lobe, likely infectious/inflammatory. Enlarging indeterminate mediastinal and hilar lymphadenopathy. Follow-up chest CT in 3 months or as per clinical protocol.  Stable spiculated right apical nodule and osteosclerotic metastases.  No new disease in the abdomen or pelvis.  -[facility] 7/29/22:  No alterations identified.    -MRI Brain 8/19/22:  No evidence of intracranial metastatic disease. Resolution of previously seen precentral gyrus subcentimeter enhancing metastases. A focal area of T2 hyperintensity is seen in the region of the previously seen left-sided enhancing lesion likely representing the sequelae of prior radiation therapy. Continued follow-up is advised.  Stable left internal auditory canal vestibular schwannoma.  Stable area of enhancement with associated susceptibility artifact within the posterior right frontal lobe compatible with sequela of treated metastasis.  -CT C/A/P 10/21/22:  Since 7/22/2022:  Stable right upper lobe spiculated right upper lobe nodule.  Stable mildly enlarged hilar lymph nodes (although increased compared to 4/22/2022).  Stable sclerotic bone lesions.  No finding to suggest new or progressive disease.  -MRI Brain 11/23/22:   * STABLE VAGUE ENHANCEMENT, MILD INCREASED T2 SIGNAL, AND HEMOSIDERIN DEPOSITION POSTERIOR SUPERIOR ASPECT RIGHT INSULA AT SITE OF PRIOR TREATED METASTASIS. 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 * NO NEW ENHANCING LESIONS IDENTIFIED.  -CT C/A/P 1/20/23:  Ill-defined nodular opacity in the right upper lobe is unchanged when compared to previous exam.  No significant change in the sclerotic focus in the T11 vertebral body when compared to previous exam.  -MRI C/T-Spine 3/23/23:  No findings of osseous malignancy. No abnormal cord enhancement or cord signal abnormality.  -MRI L-Spine 3/23/23:  No findings of osseous or leptomeningeal malignancy.  -MRI Brain 3/23/23:   Similar minimal curvilinear enhancement along the posterior insula ([id number]). Similar minimal associated T2 FLAIR signal abnormality.  Minimally decreased size of previously seen focus of enhancement in the distal left IAC, currently 2.5 mm, previously 3 mm.  No new abnormal parenchymal or leptomeningeal enhancement.  -CT C/A/P 5/5/23:  Stable exam. No new disease in the chest, abdomen or pelvis.  Unchanged spiculated right apical nodule, sub-4 mm left upper lobe nodules and multiple osteosclerotic lesions.  -MRI Brain 7/24/23:  Stable vague enhancement, mild increased T2 signal, and hemosiderin deposition posterior superior aspect right insula at site of prior treated metastasis. Stable 3 mm vestibular schwannoma fundus left IAC. No new enhancing lesions identified.  -CT C/A/P 8/18/23:  Stable exam. Unchanged 1.1 cm right apical nodule, few scattered bilateral sub-4 mm pulmonary nodules, and osteosclerotic lesions.  -MRI Brain 12/1/23:  No new areas of abnormal enhancement. Increase in size of the cortical focus of increased T2 FLAIR signal within the posterior aspect of the left frontal lobe at site of a prior treated metastasis. No associated abnormal enhancement. New 4 x 3 mm region of increased T2 signal posterior left frontal lobe near the vertex with associated decreased T1 signal and without associated enhancement. Attention to these areas on follow-up recommended.  -CT C/A/P 12/1/23:  Stable 1.2 cm right apical spiculated nodular density with stable additional less than 0.4 cm nodules. No new or enlarging pulmonary nodules or consolidations. Stable appearance of sclerotic lesions throughout the visualized osseous structures. No new evidence of metastatic disease. Abnormal appearing endometrium in a patient postmenopausal, recommend pelvic ultrasound.  -TV U/S 1/3/24:  Thickened, cystic endometrium measuring up to 1.0 cm. Gynecologic consultation for tissue sampling is recommended.  -Screening Mammo/Sono 1/25/24:  No mammographic or sonographic evidence of malignancy.  -CT C/A/P 4/2/24:  Interval disease stability within the chest, abdomen, and pelvis compared to 12/1/2023:  Right apical 1.3 cm irregular lesion and right lower lobe pulmonary micronodule unchanged. Sclerotic osseous lesions unchanged. Similar-appearing thickening of the endometrium.  -MRI Brain 5/2/24:   1. No acute intracranial abnormality. No findings suspicious for new intracranial metastases. 2. Unchanged left vestibular schwannoma. 3. Mild interval progression of overall very mild white matter disease which may be the sequelae of treatment effect, chronic microvascular ischemic disease, vasculitis, chronic migraines, and/or Lyme disease among other possibilities. 4. Nonspecific progression of an ovoid T1 hypointense focus in the medial left parietal lobe. Attention on follow-up exams is advised.  -CT CAP 8/10/24:  Stable exam, with unchanged 1.2 cm right apical nodule, punctate posterior right lower lobe nodule and osteosclerotic lesions. Unchanged indeterminate endometrial thickening. No new disease in the chest, abdomen or pelvis.  -MRI head w/wo con 8/15/24:  Minimal increase in size of multiple FLAIR hyperintense, nonenhancing lesions in the bilateral precentral gyri and right medial frontoparietal white matter. Spectroscopy evaluation of the left medial precentral gyrus lesion is nonspecific. Lesions that are amenable to perfusion evaluation demonstrate no hyperperfusion. Evolving posttreatment changes are considered but continued follow-up is recommended.  Images Reviewed/Interpreted:  -PET/CT 11/22/24:   1. Since 6/21/2021 FDG PET/CT, no significant interval change of the spiculated 1.8 cm right upper lobe lung nodule demonstrating mild FDG activity. No new hypermetabolic foci in the thorax. 2. Interval increased size of mildly avid RIGHT sclerotic sacral lesion, previously was nonavid. Other are non-FDG avid sclerotic lesions compatible for treated bone metastases are stable. 3. Persistent mild nonspecific activity within bilateral 1B cervical lymph nodes, may be inflammatory. 4. Stable nonspecific mildly avid mediastinal and bilateral hilar lymph nodes. Resolution of previously noted right hilar lymph noted activity. 5. Stable likely physiologic activity of bilateral adrenal glands. 6. Previously noted mild gastric hypermetabolism not visualized.  
[FreeTextEntry1] : -PET/CT 10/4/19:  Right upper lobe mass, mediastinal and hilar lymph nodes appear significantly smaller and less intense than prior PET/CT from March 2019 and essentially unchanged from CT from June 2019.  There is a 5 mm right middle lobe nodule appearing more prominent than the prior study.  -CT Head 11/26/19:  No acute intracranial hemorrhage.  Vasogenic edema in the right frontoparietal region in area of known metastasis as seen on MR 10/1/2019.   -MRI Brain 11/27/19:  Enhancing lesion in the right frontal operculum just medial to the sylvian fissure is larger when compared to 10/1/2019 but is significantly smaller compared with 3/13/2019 and may represent neoplasm versus post therapy changes. No change tiny enhancing nodule left internal auditory canal.   -CT C/A/P 1/7/20:  Spiculated appearing mass in the right upper lobe, consistent with patient's known lung cancer. Comparison is made to a prior CT scan of the abdomen and pelvis which was performed on 2/18/2019. The sclerotic lesions in the left iliac bone were not seen on the prior exam. The sclerotic lesion in the right sacrum is also new. The lesion in the right iliac bone was seen previously.  The sclerotic lesion in the T11 vertebral body has significantly increased in size compared to the prior exam. These findings are concerning for progression of osseous metastasis.   -PET/CT 1/31/20:  Abnormal FDG-PET/CT scan.  1. Mildly FDG-avid spiculated right upper lobe lung mass is decreased in size and metabolism as compared to PET/CT dated 10/4/2019, and is not significantly changed as compared to CT dated 1/7/2020.  2. Resolution of small, mildly FDG-avid right paratracheal lymph node.  3. Non-FDG-avid sclerotic lesions, unchanged as compared to prior PET/CT, and interval diagnostic CT, are compatible with treated bone metastases.   -MRI Brain 2/27/20:  Similar-appearing enhancing right subinsular and right frontal opercular  lesions as detailed in the body the report. Decreased edema surrounding the larger right subinsular lesion. No abnormal leptomeningeal enhancement   -PET/CT 5/4/20:  Abnormal FDG-PET/CT scan.  1. Mildly FDG-avid spiculated right upper lobe lung mass is unchanged on CT, and minimally increased in metabolism as compared to prior study dated 1/31/2020. Etiology is indeterminate. Small focus of residual disease is not excluded.  2. New small hypermetabolic right perihilar and right paratracheal lymph nodes are nonspecific.  3. Non-FDG-avid sclerotic lesions, unchanged, compatible with treated bone metastases.   -MRI Brain 5/27/20:  Unchanged size of enhancing lesion in the right subinsular region with increased surrounding vasogenic edema. Continued close interval follow-up is recommended.   -MRI Brain 7/30/20:  Abnormal enhancing lesion with surrounding edema is again identified though decreased size and surrounding edema seen when compared prior exam. This finding could be compatible with response to therapy though continued close interval follow-up until resolution is recommended.   -PET/CT 8/10/20:  Compared to FDG-PET/CT scan dated 5/4/2020:  1. Mildly FDG-avid spiculated right upper lobe lung mass is unchanged in size and metabolism.  2. Nonspecific small FDG-avid mediastinal and bilateral hilar lymph nodes are unchanged or decreased in metabolism.  3. New approximately symmetric FDG activity in bilateral adrenal glands, without new corresponding abnormalities on CT, may be physiologic.  4. Non-FDG-avid sclerotic lesions, unchanged, compatible with treated bone metastases.   -CT Head 10/26/20:  No acute intracranial hemorrhage, mass effect, or evidence of acute vascular territorial infarction.  Previously described right frontal subinsular lesion is not well visualized on this examination.  More sensitive evaluation for intracranial metastatic disease, with contrast-enhanced brain MRI may be obtained, as clinically warranted, if no contraindications exist.  -MRI Brain 10/30/20:  Decreasing enhancement and surrounding FLAIR signal abnormality along the inferior right frontal lobe consistent with response to treatment. No new lesions are identified.  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    -PET/CT 11/13/20:  Compared to FDG-PET/CT scan dated 8/10/2020: 1. Mildly FDG-avid spiculated right upper lobe nodule is unchanged in size and metabolism. 2. Nonspecific small FDG-avid mediastinal and bilateral hilar lymph nodes, unchanged. 3. Mild, symmetric FDG activity in bilateral adrenal glands, without corresponding abnormalities on CT, unchanged likely physiologic. 4. New FDG-avid fractures, posterior aspect right 11th and 12th ribs. Correlate clinically for history of trauma. Few non-FDG-avid sclerotic lesions, unchanged, compatible with treated bone metastases.  -Abdominal U/S 12/4/20:  Etiology of patient's elevated LFTs is not elucidated on this study.  No hydronephrosis.  -PET/CT 2/12/21:  Compared to FDG-PET/CT scan dated 11/13/2020: 1. Mildly FDG-avid spiculated right upper lobe nodule is unchanged in size and metabolism, compatible with treated disease. 2. Few new small FDG-avid right axillary lymph nodes are nonspecific, probably reactive. Further evaluation may be performed with ultrasound. Nonspecific small FDG-avid mediastinal and bilateral hilar lymph nodes, unchanged in number and avidity. Right paratracheal node demonstrates new calcification. 3. Probable physiologic FDG activity, bilateral adrenal glands, unchanged. 4. Mild FDG activity associated with fractures in right 11th and 12th ribs, decreased as compared to prior study. Few non-FDG-avid sclerotic lesions, unchanged, compatible with treated bone metastases.  -MRI Brain 2/25/21:  Small focus of enhancement in the right frontal lobe without significant interval change from the most recent exam compatible with a treated metastasis.  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  -Pet/CT 6/21/21:  Compared to FDG-PET/CT scan dated 2/12/2021: 1. Mildly FDG avid spiculated right upper lobe nodule is unchanged in size and metabolism, compatible with treated disease. 2. Nonspecific new mild FDG avidity of bilateral level Ib cervical lymph nodes which have minimally increased in size. Please correlate clinically and with ultrasound for further evaluation as indicated. 3. Interval decrease in size and FDG avidity of nonspecific right axillary lymph nodes. Nonspecific small FDG avid mediastinal and bilateral hilar lymph nodes, unchanged in number and avidity. 4. Probable physiologic FDG activity, bilateral adrenal glands, not significantly changed. 5. Interval further decreased FDG activity associated with fractures in the right 11th-12th ribs. A few non-FDG avid sclerotic lesions, unchanged, compatible with treated bone metastasis. 6. Unchanged mild gastric hypermetabolism. Please correlate clinically for gastritis.  -EKG 6/25/21:  NSR with occasional PVC's   -MRI Brain 7/1/21: No new lesions are identified. Stable appearance of a previously treated metastatic lesion as described. Stable 3 mm enhancing lesion in the left internal auditory canal which likely represents a vestibular schwannoma.  -MRI C-spine 8/2/2021: Degenerative changes of the cervical spine with moderate central canal stenosis at C5-6.  No definite cord signal abnormality.  No abnormal enhancement or mass lesion seen.  -PET/CT 10/5/21:  Compared to FDG-PET/CT scan 6/21/2021: Findings suggestive of progression of disease. 1. Increased FDG avidity and unchanged size of spiculated right upper lobe pulmonary nodule. 2. New or increased FDG avid mediastinal and right hilar lymph nodes. 3. New FDG avidity associated with several unchanged small lytic and small sclerotic lesions in the pelvic bones. 4. Unchanged mild symmetrical FDG activity in bilateral adrenal glands, probably physiologic. 5. Unchanged mild gastric hypermetabolism, probably inflammatory. Please correlate clinically. 6. Unchanged nonspecific, mild fairly symmetric prominent FDG activity in the tonsillar regions, possibly inflammatory. Please correlate with direct visualization. Unchanged mildly FDG avid bilateral level Ib cervical lymph nodes, possibly reactive.  -MRI Brain 10/5/21:  There are 2 small enhancing lesions which are new since the prior exam of 7/1/2021 suspicious for metastasis. A 5 mm focus is in the right frontal lobe along the motor strip and a 3 mm focus is in the left frontal lobe in the subcortical white matter of the precentral gyrus.  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  -US Pelvis 11/3/21:  Nodular echogenic focus within the endometrium suspicious for polyp.  Unremarkable appearance of the ovaries  -[facility] 11/19/21:  No alterations identified.    -MRI Brain 12/5/21:  Abnormal enhancing lesions are again seen and slightly small in size which could be due to response to therapy. Continued close interval follow-up is recommended.  -CT Chest Angio 12/7/21 (COMPARED TO CT 1/7/20 AND NOT INTERVAL PET/CT'S):  No pulmonary embolus.  Right upper lobe mass similar to 01/07/2020.  Question of new metastasis to the right hilum.  Apparent skeletal metastases unchanged  -CT C/A/P 1/26/22:  Decreased size of a neoplasm in the right upper lobe.  Stable bone metastases. No finding to suggest new or progressive disease.  -Nuc Med Bone Scan 1/26/22:  Bone scan demonstrates:  No scan abnormality corresponding to sclerotic foci in the spine and pelvis.  FDG avid foci in the pelvis seen on prior PET/CT are not identified.  Degenerative disease in the major joints.  -MRI Brain 2/15/22:  Resolution of previously seen tiny metastatic foci within the precentral gyri.  Small enhancing focus within the right frontal operculum compatible with the sequelae of a treated metastasis, unchanged from the prior exams.  Stable left intracanalicular vestibular schwannoma.  -CT C/A/P 4/22/22:  Stable examination when compared to 1/26/2022, in particular stable spiculated mass in the right upper lobe at 1.5 cm.  Non specific 6 mm right lower paratracheal lymph node, attention on follow-up recommended.  Stable bony lesions concerning for metastatic disease.  No new metastatic disease.  -MRI Brain 5/14/22:   1. No evidence of recurrent metastatic disease. 2. A punctate enhancing focus and associated susceptibility artifact in the ventral posterior aspect of the right frontal lobe, compatible with sequela of treated metastasis, is stable. 3. 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  -CT C/A/P 7/22/22:  New patchy groundglass opacity in the right lower lobe, likely infectious/inflammatory. Enlarging indeterminate mediastinal and hilar lymphadenopathy. Follow-up chest CT in 3 months or as per clinical protocol.  Stable spiculated right apical nodule and osteosclerotic metastases.  No new disease in the abdomen or pelvis.  -[facility] 7/29/22:  No alterations identified.    -MRI Brain 8/19/22:  No evidence of intracranial metastatic disease. Resolution of previously seen precentral gyrus subcentimeter enhancing metastases. A focal area of T2 hyperintensity is seen in the region of the previously seen left-sided enhancing lesion likely representing the sequelae of prior radiation therapy. Continued follow-up is advised.  Stable left internal auditory canal vestibular schwannoma.  Stable area of enhancement with associated susceptibility artifact within the posterior right frontal lobe compatible with sequela of treated metastasis.  -CT C/A/P 10/21/22:  Since 7/22/2022:  Stable right upper lobe spiculated right upper lobe nodule.  Stable mildly enlarged hilar lymph nodes (although increased compared to 4/22/2022).  Stable sclerotic bone lesions.  No finding to suggest new or progressive disease.  -MRI Brain 11/23/22:   * STABLE VAGUE ENHANCEMENT, MILD INCREASED T2 SIGNAL, AND HEMOSIDERIN DEPOSITION POSTERIOR SUPERIOR ASPECT RIGHT INSULA AT SITE OF PRIOR TREATED METASTASIS. 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 * NO NEW ENHANCING LESIONS IDENTIFIED.  -CT C/A/P 1/20/23:  Ill-defined nodular opacity in the right upper lobe is unchanged when compared to previous exam.  No significant change in the sclerotic focus in the T11 vertebral body when compared to previous exam.  -MRI C/T-Spine 3/23/23:  No findings of osseous malignancy. No abnormal cord enhancement or cord signal abnormality.  -MRI L-Spine 3/23/23:  No findings of osseous or leptomeningeal malignancy.  -MRI Brain 3/23/23:   Similar minimal curvilinear enhancement along the posterior insula ([id number]). Similar minimal associated T2 FLAIR signal abnormality.  Minimally decreased size of previously seen focus of enhancement in the distal left IAC, currently 2.5 mm, previously 3 mm.  No new abnormal parenchymal or leptomeningeal enhancement.  -CT C/A/P 5/5/23:  Stable exam. No new disease in the chest, abdomen or pelvis.  Unchanged spiculated right apical nodule, sub-4 mm left upper lobe nodules and multiple osteosclerotic lesions.  -MRI Brain 7/24/23:  Stable vague enhancement, mild increased T2 signal, and hemosiderin deposition posterior superior aspect right insula at site of prior treated metastasis. Stable 3 mm vestibular schwannoma fundus left IAC. No new enhancing lesions identified.  -CT C/A/P 8/18/23:  Stable exam. Unchanged 1.1 cm right apical nodule, few scattered bilateral sub-4 mm pulmonary nodules, and osteosclerotic lesions.  -MRI Brain 12/1/23:  No new areas of abnormal enhancement. Increase in size of the cortical focus of increased T2 FLAIR signal within the posterior aspect of the left frontal lobe at site of a prior treated metastasis. No associated abnormal enhancement. New 4 x 3 mm region of increased T2 signal posterior left frontal lobe near the vertex with associated decreased T1 signal and without associated enhancement. Attention to these areas on follow-up recommended.  -CT C/A/P 12/1/23:  Stable 1.2 cm right apical spiculated nodular density with stable additional less than 0.4 cm nodules. No new or enlarging pulmonary nodules or consolidations. Stable appearance of sclerotic lesions throughout the visualized osseous structures. No new evidence of metastatic disease. Abnormal appearing endometrium in a patient postmenopausal, recommend pelvic ultrasound.  -TV U/S 1/3/24:  Thickened, cystic endometrium measuring up to 1.0 cm. Gynecologic consultation for tissue sampling is recommended.  -Screening Mammo/Sono 1/25/24:  No mammographic or sonographic evidence of malignancy.  -CT C/A/P 4/2/24:  Interval disease stability within the chest, abdomen, and pelvis compared to 12/1/2023:  Right apical 1.3 cm irregular lesion and right lower lobe pulmonary micronodule unchanged. Sclerotic osseous lesions unchanged. Similar-appearing thickening of the endometrium.  -MRI Brain 5/2/24:   1. No acute intracranial abnormality. No findings suspicious for new intracranial metastases. 2. Unchanged left vestibular schwannoma. 3. Mild interval progression of overall very mild white matter disease which may be the sequelae of treatment effect, chronic microvascular ischemic disease, vasculitis, chronic migraines, and/or Lyme disease among other possibilities. 4. Nonspecific progression of an ovoid T1 hypointense focus in the medial left parietal lobe. Attention on follow-up exams is advised.  -CT CAP 8/10/24:  Stable exam, with unchanged 1.2 cm right apical nodule, punctate posterior right lower lobe nodule and osteosclerotic lesions. Unchanged indeterminate endometrial thickening. No new disease in the chest, abdomen or pelvis.  -MRI head w/wo con 8/15/24:  Minimal increase in size of multiple FLAIR hyperintense, nonenhancing lesions in the bilateral precentral gyri and right medial frontoparietal white matter. Spectroscopy evaluation of the left medial precentral gyrus lesion is nonspecific. Lesions that are amenable to perfusion evaluation demonstrate no hyperperfusion. Evolving posttreatment changes are considered but continued follow-up is recommended.  Images Reviewed/Interpreted:  -PET/CT 11/22/24:   1. Since 6/21/2021 FDG PET/CT, no significant interval change of the spiculated 1.8 cm right upper lobe lung nodule demonstrating mild FDG activity. No new hypermetabolic foci in the thorax. 2. Interval increased size of mildly avid RIGHT sclerotic sacral lesion, previously was nonavid. Other are non-FDG avid sclerotic lesions compatible for treated bone metastases are stable. 3. Persistent mild nonspecific activity within bilateral 1B cervical lymph nodes, may be inflammatory. 4. Stable nonspecific mildly avid mediastinal and bilateral hilar lymph nodes. Resolution of previously noted right hilar lymph noted activity. 5. Stable likely physiologic activity of bilateral adrenal glands. 6. Previously noted mild gastric hypermetabolism not visualized.

## 2024-12-05 NOTE — ASSESSMENT
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 She was treated with gamma knife RT to brain metastasis in March 2019. She started systemic therapy with Alectinib in April 2019 and achieved ME. Alectinib dose-reduced in late December 2020 due to renal function decline possibly due to anti-fungal agents for disseminated histoplasmosis. Started treatment with oral Posaconazole for histoplasmosis in mid-March 2021. Alectinib has been poorly tolerated in combination with Posaconazole, requiring dose-reductions of Alectinib. She was intolerant of the combination. Patient with sustained response on restaging PET/CT in June 2021. Alectinib was discontinued in late June 2021 due to poor tolerability in combination with posaconazole for disseminated histoplasmosis. Treated with 1 cycle of Pemetrexed chemotherapy on 7/9/21 with very poor tolerability. Further systemic therapy for lung cancer was held from July-October 2021. Restaging PET/CT October 2021 with disease progression. Restarted Alectinib 450mg BID on 10/9/21. She increased Alectinib to full dosing in late October 2021. Developed return of hemolysis in November and Alectinib permanently discontinued on 11/15/21 due to impression of drug-induced hemolytic anemia associated with this agent. Hemolysis resolved with discontinuation of offending agent (Alectinib). [facility] ct-DNA testing 11/19/21 with no evidence of ALK resistance mutations. Started 2nd line Brigatinib in late November 2021; achieved ME. Brain MRI Aug 2024 with suspected post-treatment changes.    Restaging PET/CT Nov 2024 with sustained response with regards to her cancer; there is mention of increased avidity in a sacral lesion, however the scan is compared with a prior scan from 2021.   Recommend: -Continue treatment with 2nd line Brigatinib targeting her ALK rearrangement for as long as it benefits the patient, currently at 180mg PO daily. -Repeat labs today. Can monitor CEA; not clear if informative. Monitor CPK, amylase/lipase while on brigatinib.  No clear role/benefit for monitoring other tumor markers.   -Anemia:  monitor hemoglobin and periodic iron studies  -Questionable Histoplasmosis in BM s/p 1 year of antifungal therapy completed April 2022. Being monitored off antifungal; BM results possibly artifactual. Previously followed with ID. -F/U with Neuro-Oncologist. Titrated off Briviact. -F/U with Rad-Onc for management of Brain metastases. To have 6-month Brain MRI in Feb 2025. -Given the previous deep response in bone metastases, avoided the addition of a bone modifying agent -F/U with PMR PRN - Discussed potential radiation oncology consultation to evaluate for potential consolidative thoracic RT, as recommended by her outside second opinion from [place].  Discussed that I could place a referral.  After the visit, felt it was reasonable to have her case reviewed at thoracic tumor board first. - Follow-up in late January or sooner should problems arise (2 months).  - Check out form given to patient with instructions for making appointments

## 2024-12-05 NOTE — HISTORY OF PRESENT ILLNESS
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  She presented with symptomatic brain metastasis and was found to have a RUL primary tumor with intrathoracic LN, bone metastases, liver metastasis and brain metastasis.  Biopsy of the primary tumor in late Feb 2019 revealed her diagnosis.  She was treated with SRS to the brain in March 2019 with nice response.  She was started on first line Alectinib in April 2019 and achieved CO.    Patient is status post admission at [facility] from 11/27-12/4/2020 for treatment of disseminated histoplasmosis.  She was started on AmBisome with a plan to insert a PICC line however one of her blood cultures grew multiple organisms delaying the PICC line placement.  Subsequent fungal and bacterial cultures were negative.  During her hospital stay, she developed ANDREW which was felt to be secondary to the AmBisome however her alectinib was held during the hospital stay.  Abdominal ultrasound was unremarkable.  She was transfused 1 unit PRBCs prior to discharge.  She was discharged with plan to treat the histoplasmosis with oral posaconazole in the outpatient setting.  Patient has subsequently been on treatment with dose-reduced Alectinib 450mg BID since late December 2020 due to renal function.   She started Posaconazole in mid-March with the Alectinib.  Alectinib was stopped in late June 2021 due to poor tolerability with the combination.   Patient was started on Pemetrexed chemotherapy in July 2021 in effort to maintain control of her lung cancer while preserving sensitivity to ALK inhibitors and allowing her to be treated with Posaconazole for the disseminated histo.  She received 1 cycle of chemotherapy with Pemetrexed that was very poorly tolerated.  She completed roughly 1 year of Posaconazole in late April 2022.   She had repeat BM Bx in Sept 2021 showing evidence of persistent histoplasmosis and was continued on Posaconazole. Restaging PET/CT in early October with evidence of disease progression.  She restarted Alectinib 450mg BID on 10/9/21.   MRI Brain on 10/5/21 revealed two new small metastases; she had f/u with Rad-Onc and given her asymptomatic status coupled with restarting the Alectinib, the plan was to do a short-term f/u MRI at a 2 month interval in early December.    Developed return of hemolysis in November 2021.  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  Alectinib discontinued in mid-November 2021.   Started 2nd line Brigatinib in November 2021; achieved CO.  [facility] testing in July 2022 without any alterations.   [de-identified] : -Lung, RUL CT-guided FNA 2/26/19:  Positive for malignant cells.  Adenocarcinoma.  PDL1 positive at 5%.  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  [de-identified] : Patient continues on 2nd line Brigatinib since Nov 2021; achieved DC.   Patient presents for follow-up. She reports occasional myalgias.  Overall feeling quite well.  Denies F/C/N/V/D.  Restaging PET/CT 11/22/2024 reveals an overall sustained response.  There is note of increased avidity of a right sclerotic sacral bone lesion, however, this was compared to a prior PET/CT from 2021.  Reassurance provided and discussed that this does not appear to be significant or would alter her management.

## 2024-12-05 NOTE — PHYSICAL EXAM
[Fully active, able to carry on all pre-disease performance without restriction] : Status 0 - Fully active, able to carry on all pre-disease performance without restriction [Normal] : affect appropriate [de-identified] : No icterus  [de-identified] : No LAD [de-identified] : Clear [de-identified] : S1 S2 [de-identified] : No edema [de-identified] : No spine/CVA tenderness [de-identified] : Ambulatory

## 2024-12-05 NOTE — PHYSICAL EXAM
[Fully active, able to carry on all pre-disease performance without restriction] : Status 0 - Fully active, able to carry on all pre-disease performance without restriction [Normal] : affect appropriate [de-identified] : No icterus  [de-identified] : No LAD [de-identified] : Clear [de-identified] : S1 S2 [de-identified] : No edema [de-identified] : No spine/CVA tenderness [de-identified] : Ambulatory

## 2024-12-05 NOTE — ASSESSMENT
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 She was treated with gamma knife RT to brain metastasis in March 2019. She started systemic therapy with Alectinib in April 2019 and achieved IN. Alectinib dose-reduced in late December 2020 due to renal function decline possibly due to anti-fungal agents for disseminated histoplasmosis. Started treatment with oral Posaconazole for histoplasmosis in mid-March 2021. Alectinib has been poorly tolerated in combination with Posaconazole, requiring dose-reductions of Alectinib. She was intolerant of the combination. Patient with sustained response on restaging PET/CT in June 2021. Alectinib was discontinued in late June 2021 due to poor tolerability in combination with posaconazole for disseminated histoplasmosis. Treated with 1 cycle of Pemetrexed chemotherapy on 7/9/21 with very poor tolerability. Further systemic therapy for lung cancer was held from July-October 2021. Restaging PET/CT October 2021 with disease progression. Restarted Alectinib 450mg BID on 10/9/21. She increased Alectinib to full dosing in late October 2021. Developed return of hemolysis in November and Alectinib permanently discontinued on 11/15/21 due to impression of drug-induced hemolytic anemia associated with this agent. Hemolysis resolved with discontinuation of offending agent (Alectinib). [facility] ct-DNA testing 11/19/21 with no evidence of ALK resistance mutations. Started 2nd line Brigatinib in late November 2021; achieved IN. Brain MRI Aug 2024 with suspected post-treatment changes.    Restaging PET/CT Nov 2024 with sustained response with regards to her cancer; there is mention of increased avidity in a sacral lesion, however the scan is compared with a prior scan from 2021.   Recommend: -Continue treatment with 2nd line Brigatinib targeting her ALK rearrangement for as long as it benefits the patient, currently at 180mg PO daily. -Repeat labs today. Can monitor CEA; not clear if informative. Monitor CPK, amylase/lipase while on brigatinib.  No clear role/benefit for monitoring other tumor markers.   -Anemia:  monitor hemoglobin and periodic iron studies  -Questionable Histoplasmosis in BM s/p 1 year of antifungal therapy completed April 2022. Being monitored off antifungal; BM results possibly artifactual. Previously followed with ID. -F/U with Neuro-Oncologist. Titrated off Briviact. -F/U with Rad-Onc for management of Brain metastases. To have 6-month Brain MRI in Feb 2025. -Given the previous deep response in bone metastases, avoided the addition of a bone modifying agent -F/U with PMR PRN - Discussed potential radiation oncology consultation to evaluate for potential consolidative thoracic RT, as recommended by her outside second opinion from [place].  Discussed that I could place a referral.  After the visit, felt it was reasonable to have her case reviewed at thoracic tumor board first. - Follow-up in late January or sooner should problems arise (2 months).  - Check out form given to patient with instructions for making appointments

## 2025-01-27 NOTE — PHYSICAL EXAM
[Fully active, able to carry on all pre-disease performance without restriction] : Status 0 - Fully active, able to carry on all pre-disease performance without restriction [Normal] : affect appropriate [de-identified] : No icterus  [de-identified] : No LAD [de-identified] : Clear [de-identified] : S1 S2 [de-identified] : No edema [de-identified] : No spine/CVA tenderness [de-identified] : Ambulatory

## 2025-01-27 NOTE — PHYSICAL EXAM
[Fully active, able to carry on all pre-disease performance without restriction] : Status 0 - Fully active, able to carry on all pre-disease performance without restriction [Normal] : affect appropriate [de-identified] : No icterus  [de-identified] : No LAD [de-identified] : Clear [de-identified] : S1 S2 [de-identified] : No edema [de-identified] : No spine/CVA tenderness [de-identified] : Ambulatory

## 2025-01-27 NOTE — RESULTS/DATA
[FreeTextEntry1] : -PET/CT 10/4/19:  Right upper lobe mass, mediastinal and hilar lymph nodes appear significantly smaller and less intense than prior PET/CT from March 2019 and essentially unchanged from CT from June 2019.  There is a 5 mm right middle lobe nodule appearing more prominent than the prior study.  -CT Head 11/26/19:  No acute intracranial hemorrhage.  Vasogenic edema in the right frontoparietal region in area of known metastasis as seen on MR 10/1/2019.   -MRI Brain 11/27/19:  Enhancing lesion in the right frontal operculum just medial to the sylvian fissure is larger when compared to 10/1/2019 but is significantly smaller compared with 3/13/2019 and may represent neoplasm versus post therapy changes. No change tiny enhancing nodule left internal auditory canal.   -CT C/A/P 1/7/20:  Spiculated appearing mass in the right upper lobe, consistent with patient's known lung cancer. Comparison is made to a prior CT scan of the abdomen and pelvis which was performed on 2/18/2019. The sclerotic lesions in the left iliac bone were not seen on the prior exam. The sclerotic lesion in the right sacrum is also new. The lesion in the right iliac bone was seen previously.  The sclerotic lesion in the T11 vertebral body has significantly increased in size compared to the prior exam. These findings are concerning for progression of osseous metastasis.   -PET/CT 1/31/20:  Abnormal FDG-PET/CT scan.  1. Mildly FDG-avid spiculated right upper lobe lung mass is decreased in size and metabolism as compared to PET/CT dated 10/4/2019, and is not significantly changed as compared to CT dated 1/7/2020.  2. Resolution of small, mildly FDG-avid right paratracheal lymph node.  3. Non-FDG-avid sclerotic lesions, unchanged as compared to prior PET/CT, and interval diagnostic CT, are compatible with treated bone metastases.   -MRI Brain 2/27/20:  Similar-appearing enhancing right subinsular and right frontal opercular  lesions as detailed in the body the report. Decreased edema surrounding the larger right subinsular lesion. No abnormal leptomeningeal enhancement   -PET/CT 5/4/20:  Abnormal FDG-PET/CT scan.  1. Mildly FDG-avid spiculated right upper lobe lung mass is unchanged on CT, and minimally increased in metabolism as compared to prior study dated 1/31/2020. Etiology is indeterminate. Small focus of residual disease is not excluded.  2. New small hypermetabolic right perihilar and right paratracheal lymph nodes are nonspecific.  3. Non-FDG-avid sclerotic lesions, unchanged, compatible with treated bone metastases.   -MRI Brain 5/27/20:  Unchanged size of enhancing lesion in the right subinsular region with increased surrounding vasogenic edema. Continued close interval follow-up is recommended.   -MRI Brain 7/30/20:  Abnormal enhancing lesion with surrounding edema is again identified though decreased size and surrounding edema seen when compared prior exam. This finding could be compatible with response to therapy though continued close interval follow-up until resolution is recommended.   -PET/CT 8/10/20:  Compared to FDG-PET/CT scan dated 5/4/2020:  1. Mildly FDG-avid spiculated right upper lobe lung mass is unchanged in size and metabolism.  2. Nonspecific small FDG-avid mediastinal and bilateral hilar lymph nodes are unchanged or decreased in metabolism.  3. New approximately symmetric FDG activity in bilateral adrenal glands, without new corresponding abnormalities on CT, may be physiologic.  4. Non-FDG-avid sclerotic lesions, unchanged, compatible with treated bone metastases.   -CT Head 10/26/20:  No acute intracranial hemorrhage, mass effect, or evidence of acute vascular territorial infarction.  Previously described right frontal subinsular lesion is not well visualized on this examination.  More sensitive evaluation for intracranial metastatic disease, with contrast-enhanced brain MRI may be obtained, as clinically warranted, if no contraindications exist.  -MRI Brain 10/30/20:  Decreasing enhancement and surrounding FLAIR signal abnormality along the inferior right frontal lobe consistent with response to treatment. No new lesions are identified.  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    -PET/CT 11/13/20:  Compared to FDG-PET/CT scan dated 8/10/2020: 1. Mildly FDG-avid spiculated right upper lobe nodule is unchanged in size and metabolism. 2. Nonspecific small FDG-avid mediastinal and bilateral hilar lymph nodes, unchanged. 3. Mild, symmetric FDG activity in bilateral adrenal glands, without corresponding abnormalities on CT, unchanged likely physiologic. 4. New FDG-avid fractures, posterior aspect right 11th and 12th ribs. Correlate clinically for history of trauma. Few non-FDG-avid sclerotic lesions, unchanged, compatible with treated bone metastases.  -Abdominal U/S 12/4/20:  Etiology of patient's elevated LFTs is not elucidated on this study.  No hydronephrosis.  -PET/CT 2/12/21:  Compared to FDG-PET/CT scan dated 11/13/2020: 1. Mildly FDG-avid spiculated right upper lobe nodule is unchanged in size and metabolism, compatible with treated disease. 2. Few new small FDG-avid right axillary lymph nodes are nonspecific, probably reactive. Further evaluation may be performed with ultrasound. Nonspecific small FDG-avid mediastinal and bilateral hilar lymph nodes, unchanged in number and avidity. Right paratracheal node demonstrates new calcification. 3. Probable physiologic FDG activity, bilateral adrenal glands, unchanged. 4. Mild FDG activity associated with fractures in right 11th and 12th ribs, decreased as compared to prior study. Few non-FDG-avid sclerotic lesions, unchanged, compatible with treated bone metastases.  -MRI Brain 2/25/21:  Small focus of enhancement in the right frontal lobe without significant interval change from the most recent exam compatible with a treated metastasis.  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  -Pet/CT 6/21/21:  Compared to FDG-PET/CT scan dated 2/12/2021: 1. Mildly FDG avid spiculated right upper lobe nodule is unchanged in size and metabolism, compatible with treated disease. 2. Nonspecific new mild FDG avidity of bilateral level Ib cervical lymph nodes which have minimally increased in size. Please correlate clinically and with ultrasound for further evaluation as indicated. 3. Interval decrease in size and FDG avidity of nonspecific right axillary lymph nodes. Nonspecific small FDG avid mediastinal and bilateral hilar lymph nodes, unchanged in number and avidity. 4. Probable physiologic FDG activity, bilateral adrenal glands, not significantly changed. 5. Interval further decreased FDG activity associated with fractures in the right 11th-12th ribs. A few non-FDG avid sclerotic lesions, unchanged, compatible with treated bone metastasis. 6. Unchanged mild gastric hypermetabolism. Please correlate clinically for gastritis.  -EKG 6/25/21:  NSR with occasional PVC's   -MRI Brain 7/1/21: No new lesions are identified. Stable appearance of a previously treated metastatic lesion as described. Stable 3 mm enhancing lesion in the left internal auditory canal which likely represents a vestibular schwannoma.  -MRI C-spine 8/2/2021: Degenerative changes of the cervical spine with moderate central canal stenosis at C5-6.  No definite cord signal abnormality.  No abnormal enhancement or mass lesion seen.  -PET/CT 10/5/21:  Compared to FDG-PET/CT scan 6/21/2021: Findings suggestive of progression of disease. 1. Increased FDG avidity and unchanged size of spiculated right upper lobe pulmonary nodule. 2. New or increased FDG avid mediastinal and right hilar lymph nodes. 3. New FDG avidity associated with several unchanged small lytic and small sclerotic lesions in the pelvic bones. 4. Unchanged mild symmetrical FDG activity in bilateral adrenal glands, probably physiologic. 5. Unchanged mild gastric hypermetabolism, probably inflammatory. Please correlate clinically. 6. Unchanged nonspecific, mild fairly symmetric prominent FDG activity in the tonsillar regions, possibly inflammatory. Please correlate with direct visualization. Unchanged mildly FDG avid bilateral level Ib cervical lymph nodes, possibly reactive.  -MRI Brain 10/5/21:  There are 2 small enhancing lesions which are new since the prior exam of 7/1/2021 suspicious for metastasis. A 5 mm focus is in the right frontal lobe along the motor strip and a 3 mm focus is in the left frontal lobe in the subcortical white matter of the precentral gyrus.  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  -US Pelvis 11/3/21:  Nodular echogenic focus within the endometrium suspicious for polyp.  Unremarkable appearance of the ovaries  -[facility] 11/19/21:  No alterations identified.    -MRI Brain 12/5/21:  Abnormal enhancing lesions are again seen and slightly small in size which could be due to response to therapy. Continued close interval follow-up is recommended.  -CT Chest Angio 12/7/21 (COMPARED TO CT 1/7/20 AND NOT INTERVAL PET/CT'S):  No pulmonary embolus.  Right upper lobe mass similar to 01/07/2020.  Question of new metastasis to the right hilum.  Apparent skeletal metastases unchanged  -CT C/A/P 1/26/22:  Decreased size of a neoplasm in the right upper lobe.  Stable bone metastases. No finding to suggest new or progressive disease.  -Nuc Med Bone Scan 1/26/22:  Bone scan demonstrates:  No scan abnormality corresponding to sclerotic foci in the spine and pelvis.  FDG avid foci in the pelvis seen on prior PET/CT are not identified.  Degenerative disease in the major joints.  -MRI Brain 2/15/22:  Resolution of previously seen tiny metastatic foci within the precentral gyri.  Small enhancing focus within the right frontal operculum compatible with the sequelae of a treated metastasis, unchanged from the prior exams.  Stable left intracanalicular vestibular schwannoma.  -CT C/A/P 4/22/22:  Stable examination when compared to 1/26/2022, in particular stable spiculated mass in the right upper lobe at 1.5 cm.  Non specific 6 mm right lower paratracheal lymph node, attention on follow-up recommended.  Stable bony lesions concerning for metastatic disease.  No new metastatic disease.  -MRI Brain 5/14/22:   1. No evidence of recurrent metastatic disease. 2. A punctate enhancing focus and associated susceptibility artifact in the ventral posterior aspect of the right frontal lobe, compatible with sequela of treated metastasis, is stable. 3. 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  -CT C/A/P 7/22/22:  New patchy groundglass opacity in the right lower lobe, likely infectious/inflammatory. Enlarging indeterminate mediastinal and hilar lymphadenopathy. Follow-up chest CT in 3 months or as per clinical protocol.  Stable spiculated right apical nodule and osteosclerotic metastases.  No new disease in the abdomen or pelvis.  -[facility] 7/29/22:  No alterations identified.    -MRI Brain 8/19/22:  No evidence of intracranial metastatic disease. Resolution of previously seen precentral gyrus subcentimeter enhancing metastases. A focal area of T2 hyperintensity is seen in the region of the previously seen left-sided enhancing lesion likely representing the sequelae of prior radiation therapy. Continued follow-up is advised.  Stable left internal auditory canal vestibular schwannoma.  Stable area of enhancement with associated susceptibility artifact within the posterior right frontal lobe compatible with sequela of treated metastasis.  -CT C/A/P 10/21/22:  Since 7/22/2022:  Stable right upper lobe spiculated right upper lobe nodule.  Stable mildly enlarged hilar lymph nodes (although increased compared to 4/22/2022).  Stable sclerotic bone lesions.  No finding to suggest new or progressive disease.  -MRI Brain 11/23/22:   * STABLE VAGUE ENHANCEMENT, MILD INCREASED T2 SIGNAL, AND HEMOSIDERIN DEPOSITION POSTERIOR SUPERIOR ASPECT RIGHT INSULA AT SITE OF PRIOR TREATED METASTASIS. 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 * NO NEW ENHANCING LESIONS IDENTIFIED.  -CT C/A/P 1/20/23:  Ill-defined nodular opacity in the right upper lobe is unchanged when compared to previous exam.  No significant change in the sclerotic focus in the T11 vertebral body when compared to previous exam.  -MRI C/T-Spine 3/23/23:  No findings of osseous malignancy. No abnormal cord enhancement or cord signal abnormality.  -MRI L-Spine 3/23/23:  No findings of osseous or leptomeningeal malignancy.  -MRI Brain 3/23/23:   Similar minimal curvilinear enhancement along the posterior insula ([id number]). Similar minimal associated T2 FLAIR signal abnormality.  Minimally decreased size of previously seen focus of enhancement in the distal left IAC, currently 2.5 mm, previously 3 mm.  No new abnormal parenchymal or leptomeningeal enhancement.  -CT C/A/P 5/5/23:  Stable exam. No new disease in the chest, abdomen or pelvis.  Unchanged spiculated right apical nodule, sub-4 mm left upper lobe nodules and multiple osteosclerotic lesions.  -MRI Brain 7/24/23:  Stable vague enhancement, mild increased T2 signal, and hemosiderin deposition posterior superior aspect right insula at site of prior treated metastasis. Stable 3 mm vestibular schwannoma fundus left IAC. No new enhancing lesions identified.  -CT C/A/P 8/18/23:  Stable exam. Unchanged 1.1 cm right apical nodule, few scattered bilateral sub-4 mm pulmonary nodules, and osteosclerotic lesions.  -MRI Brain 12/1/23:  No new areas of abnormal enhancement. Increase in size of the cortical focus of increased T2 FLAIR signal within the posterior aspect of the left frontal lobe at site of a prior treated metastasis. No associated abnormal enhancement. New 4 x 3 mm region of increased T2 signal posterior left frontal lobe near the vertex with associated decreased T1 signal and without associated enhancement. Attention to these areas on follow-up recommended.  -CT C/A/P 12/1/23:  Stable 1.2 cm right apical spiculated nodular density with stable additional less than 0.4 cm nodules. No new or enlarging pulmonary nodules or consolidations. Stable appearance of sclerotic lesions throughout the visualized osseous structures. No new evidence of metastatic disease. Abnormal appearing endometrium in a patient postmenopausal, recommend pelvic ultrasound.  -TV U/S 1/3/24:  Thickened, cystic endometrium measuring up to 1.0 cm. Gynecologic consultation for tissue sampling is recommended.  -Screening Mammo/Sono 1/25/24:  No mammographic or sonographic evidence of malignancy.  -CT C/A/P 4/2/24:  Interval disease stability within the chest, abdomen, and pelvis compared to 12/1/2023:  Right apical 1.3 cm irregular lesion and right lower lobe pulmonary micronodule unchanged. Sclerotic osseous lesions unchanged. Similar-appearing thickening of the endometrium.  -MRI Brain 5/2/24:   1. No acute intracranial abnormality. No findings suspicious for new intracranial metastases. 2. Unchanged left vestibular schwannoma. 3. Mild interval progression of overall very mild white matter disease which may be the sequelae of treatment effect, chronic microvascular ischemic disease, vasculitis, chronic migraines, and/or Lyme disease among other possibilities. 4. Nonspecific progression of an ovoid T1 hypointense focus in the medial left parietal lobe. Attention on follow-up exams is advised.  -CT CAP 8/10/24:  Stable exam, with unchanged 1.2 cm right apical nodule, punctate posterior right lower lobe nodule and osteosclerotic lesions. Unchanged indeterminate endometrial thickening. No new disease in the chest, abdomen or pelvis.  -MRI head w/wo con 8/15/24:  Minimal increase in size of multiple FLAIR hyperintense, nonenhancing lesions in the bilateral precentral gyri and right medial frontoparietal white matter. Spectroscopy evaluation of the left medial precentral gyrus lesion is nonspecific. Lesions that are amenable to perfusion evaluation demonstrate no hyperperfusion. Evolving posttreatment changes are considered but continued follow-up is recommended.  -PET/CT 11/22/24:   1. Since 6/21/2021 FDG PET/CT, no significant interval change of the spiculated 1.8 cm right upper lobe lung nodule demonstrating mild FDG activity. No new hypermetabolic foci in the thorax. 2. Interval increased size of mildly avid RIGHT sclerotic sacral lesion, previously was nonavid. Other are non-FDG avid sclerotic lesions compatible for treated bone metastases are stable. 3. Persistent mild nonspecific activity within bilateral 1B cervical lymph nodes, may be inflammatory. 4. Stable nonspecific mildly avid mediastinal and bilateral hilar lymph nodes. Resolution of previously noted right hilar lymph noted activity. 5. Stable likely physiologic activity of bilateral adrenal glands. 6. Previously noted mild gastric hypermetabolism not visualized.

## 2025-01-27 NOTE — RESULTS/DATA
[FreeTextEntry1] : -PET/CT 10/4/19:  Right upper lobe mass, mediastinal and hilar lymph nodes appear significantly smaller and less intense than prior PET/CT from March 2019 and essentially unchanged from CT from June 2019.  There is a 5 mm right middle lobe nodule appearing more prominent than the prior study.  -CT Head 11/26/19:  No acute intracranial hemorrhage.  Vasogenic edema in the right frontoparietal region in area of known metastasis as seen on MR 10/1/2019.   -MRI Brain 11/27/19:  Enhancing lesion in the right frontal operculum just medial to the sylvian fissure is larger when compared to 10/1/2019 but is significantly smaller compared with 3/13/2019 and may represent neoplasm versus post therapy changes. No change tiny enhancing nodule left internal auditory canal.   -CT C/A/P 1/7/20:  Spiculated appearing mass in the right upper lobe, consistent with patient's known lung cancer. Comparison is made to a prior CT scan of the abdomen and pelvis which was performed on 2/18/2019. The sclerotic lesions in the left iliac bone were not seen on the prior exam. The sclerotic lesion in the right sacrum is also new. The lesion in the right iliac bone was seen previously.  The sclerotic lesion in the T11 vertebral body has significantly increased in size compared to the prior exam. These findings are concerning for progression of osseous metastasis.   -PET/CT 1/31/20:  Abnormal FDG-PET/CT scan.  1. Mildly FDG-avid spiculated right upper lobe lung mass is decreased in size and metabolism as compared to PET/CT dated 10/4/2019, and is not significantly changed as compared to CT dated 1/7/2020.  2. Resolution of small, mildly FDG-avid right paratracheal lymph node.  3. Non-FDG-avid sclerotic lesions, unchanged as compared to prior PET/CT, and interval diagnostic CT, are compatible with treated bone metastases.   -MRI Brain 2/27/20:  Similar-appearing enhancing right subinsular and right frontal opercular  lesions as detailed in the body the report. Decreased edema surrounding the larger right subinsular lesion. No abnormal leptomeningeal enhancement   -PET/CT 5/4/20:  Abnormal FDG-PET/CT scan.  1. Mildly FDG-avid spiculated right upper lobe lung mass is unchanged on CT, and minimally increased in metabolism as compared to prior study dated 1/31/2020. Etiology is indeterminate. Small focus of residual disease is not excluded.  2. New small hypermetabolic right perihilar and right paratracheal lymph nodes are nonspecific.  3. Non-FDG-avid sclerotic lesions, unchanged, compatible with treated bone metastases.   -MRI Brain 5/27/20:  Unchanged size of enhancing lesion in the right subinsular region with increased surrounding vasogenic edema. Continued close interval follow-up is recommended.   -MRI Brain 7/30/20:  Abnormal enhancing lesion with surrounding edema is again identified though decreased size and surrounding edema seen when compared prior exam. This finding could be compatible with response to therapy though continued close interval follow-up until resolution is recommended.   -PET/CT 8/10/20:  Compared to FDG-PET/CT scan dated 5/4/2020:  1. Mildly FDG-avid spiculated right upper lobe lung mass is unchanged in size and metabolism.  2. Nonspecific small FDG-avid mediastinal and bilateral hilar lymph nodes are unchanged or decreased in metabolism.  3. New approximately symmetric FDG activity in bilateral adrenal glands, without new corresponding abnormalities on CT, may be physiologic.  4. Non-FDG-avid sclerotic lesions, unchanged, compatible with treated bone metastases.   -CT Head 10/26/20:  No acute intracranial hemorrhage, mass effect, or evidence of acute vascular territorial infarction.  Previously described right frontal subinsular lesion is not well visualized on this examination.  More sensitive evaluation for intracranial metastatic disease, with contrast-enhanced brain MRI may be obtained, as clinically warranted, if no contraindications exist.  -MRI Brain 10/30/20:  Decreasing enhancement and surrounding FLAIR signal abnormality along the inferior right frontal lobe consistent with response to treatment. No new lesions are identified.  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    -PET/CT 11/13/20:  Compared to FDG-PET/CT scan dated 8/10/2020: 1. Mildly FDG-avid spiculated right upper lobe nodule is unchanged in size and metabolism. 2. Nonspecific small FDG-avid mediastinal and bilateral hilar lymph nodes, unchanged. 3. Mild, symmetric FDG activity in bilateral adrenal glands, without corresponding abnormalities on CT, unchanged likely physiologic. 4. New FDG-avid fractures, posterior aspect right 11th and 12th ribs. Correlate clinically for history of trauma. Few non-FDG-avid sclerotic lesions, unchanged, compatible with treated bone metastases.  -Abdominal U/S 12/4/20:  Etiology of patient's elevated LFTs is not elucidated on this study.  No hydronephrosis.  -PET/CT 2/12/21:  Compared to FDG-PET/CT scan dated 11/13/2020: 1. Mildly FDG-avid spiculated right upper lobe nodule is unchanged in size and metabolism, compatible with treated disease. 2. Few new small FDG-avid right axillary lymph nodes are nonspecific, probably reactive. Further evaluation may be performed with ultrasound. Nonspecific small FDG-avid mediastinal and bilateral hilar lymph nodes, unchanged in number and avidity. Right paratracheal node demonstrates new calcification. 3. Probable physiologic FDG activity, bilateral adrenal glands, unchanged. 4. Mild FDG activity associated with fractures in right 11th and 12th ribs, decreased as compared to prior study. Few non-FDG-avid sclerotic lesions, unchanged, compatible with treated bone metastases.  -MRI Brain 2/25/21:  Small focus of enhancement in the right frontal lobe without significant interval change from the most recent exam compatible with a treated metastasis.  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  -Pet/CT 6/21/21:  Compared to FDG-PET/CT scan dated 2/12/2021: 1. Mildly FDG avid spiculated right upper lobe nodule is unchanged in size and metabolism, compatible with treated disease. 2. Nonspecific new mild FDG avidity of bilateral level Ib cervical lymph nodes which have minimally increased in size. Please correlate clinically and with ultrasound for further evaluation as indicated. 3. Interval decrease in size and FDG avidity of nonspecific right axillary lymph nodes. Nonspecific small FDG avid mediastinal and bilateral hilar lymph nodes, unchanged in number and avidity. 4. Probable physiologic FDG activity, bilateral adrenal glands, not significantly changed. 5. Interval further decreased FDG activity associated with fractures in the right 11th-12th ribs. A few non-FDG avid sclerotic lesions, unchanged, compatible with treated bone metastasis. 6. Unchanged mild gastric hypermetabolism. Please correlate clinically for gastritis.  -EKG 6/25/21:  NSR with occasional PVC's   -MRI Brain 7/1/21: No new lesions are identified. Stable appearance of a previously treated metastatic lesion as described. Stable 3 mm enhancing lesion in the left internal auditory canal which likely represents a vestibular schwannoma.  -MRI C-spine 8/2/2021: Degenerative changes of the cervical spine with moderate central canal stenosis at C5-6.  No definite cord signal abnormality.  No abnormal enhancement or mass lesion seen.  -PET/CT 10/5/21:  Compared to FDG-PET/CT scan 6/21/2021: Findings suggestive of progression of disease. 1. Increased FDG avidity and unchanged size of spiculated right upper lobe pulmonary nodule. 2. New or increased FDG avid mediastinal and right hilar lymph nodes. 3. New FDG avidity associated with several unchanged small lytic and small sclerotic lesions in the pelvic bones. 4. Unchanged mild symmetrical FDG activity in bilateral adrenal glands, probably physiologic. 5. Unchanged mild gastric hypermetabolism, probably inflammatory. Please correlate clinically. 6. Unchanged nonspecific, mild fairly symmetric prominent FDG activity in the tonsillar regions, possibly inflammatory. Please correlate with direct visualization. Unchanged mildly FDG avid bilateral level Ib cervical lymph nodes, possibly reactive.  -MRI Brain 10/5/21:  There are 2 small enhancing lesions which are new since the prior exam of 7/1/2021 suspicious for metastasis. A 5 mm focus is in the right frontal lobe along the motor strip and a 3 mm focus is in the left frontal lobe in the subcortical white matter of the precentral gyrus.  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  -US Pelvis 11/3/21:  Nodular echogenic focus within the endometrium suspicious for polyp.  Unremarkable appearance of the ovaries  -[facility] 11/19/21:  No alterations identified.    -MRI Brain 12/5/21:  Abnormal enhancing lesions are again seen and slightly small in size which could be due to response to therapy. Continued close interval follow-up is recommended.  -CT Chest Angio 12/7/21 (COMPARED TO CT 1/7/20 AND NOT INTERVAL PET/CT'S):  No pulmonary embolus.  Right upper lobe mass similar to 01/07/2020.  Question of new metastasis to the right hilum.  Apparent skeletal metastases unchanged  -CT C/A/P 1/26/22:  Decreased size of a neoplasm in the right upper lobe.  Stable bone metastases. No finding to suggest new or progressive disease.  -Nuc Med Bone Scan 1/26/22:  Bone scan demonstrates:  No scan abnormality corresponding to sclerotic foci in the spine and pelvis.  FDG avid foci in the pelvis seen on prior PET/CT are not identified.  Degenerative disease in the major joints.  -MRI Brain 2/15/22:  Resolution of previously seen tiny metastatic foci within the precentral gyri.  Small enhancing focus within the right frontal operculum compatible with the sequelae of a treated metastasis, unchanged from the prior exams.  Stable left intracanalicular vestibular schwannoma.  -CT C/A/P 4/22/22:  Stable examination when compared to 1/26/2022, in particular stable spiculated mass in the right upper lobe at 1.5 cm.  Non specific 6 mm right lower paratracheal lymph node, attention on follow-up recommended.  Stable bony lesions concerning for metastatic disease.  No new metastatic disease.  -MRI Brain 5/14/22:   1. No evidence of recurrent metastatic disease. 2. A punctate enhancing focus and associated susceptibility artifact in the ventral posterior aspect of the right frontal lobe, compatible with sequela of treated metastasis, is stable. 3. 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  -CT C/A/P 7/22/22:  New patchy groundglass opacity in the right lower lobe, likely infectious/inflammatory. Enlarging indeterminate mediastinal and hilar lymphadenopathy. Follow-up chest CT in 3 months or as per clinical protocol.  Stable spiculated right apical nodule and osteosclerotic metastases.  No new disease in the abdomen or pelvis.  -[facility] 7/29/22:  No alterations identified.    -MRI Brain 8/19/22:  No evidence of intracranial metastatic disease. Resolution of previously seen precentral gyrus subcentimeter enhancing metastases. A focal area of T2 hyperintensity is seen in the region of the previously seen left-sided enhancing lesion likely representing the sequelae of prior radiation therapy. Continued follow-up is advised.  Stable left internal auditory canal vestibular schwannoma.  Stable area of enhancement with associated susceptibility artifact within the posterior right frontal lobe compatible with sequela of treated metastasis.  -CT C/A/P 10/21/22:  Since 7/22/2022:  Stable right upper lobe spiculated right upper lobe nodule.  Stable mildly enlarged hilar lymph nodes (although increased compared to 4/22/2022).  Stable sclerotic bone lesions.  No finding to suggest new or progressive disease.  -MRI Brain 11/23/22:   * STABLE VAGUE ENHANCEMENT, MILD INCREASED T2 SIGNAL, AND HEMOSIDERIN DEPOSITION POSTERIOR SUPERIOR ASPECT RIGHT INSULA AT SITE OF PRIOR TREATED METASTASIS. 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 * NO NEW ENHANCING LESIONS IDENTIFIED.  -CT C/A/P 1/20/23:  Ill-defined nodular opacity in the right upper lobe is unchanged when compared to previous exam.  No significant change in the sclerotic focus in the T11 vertebral body when compared to previous exam.  -MRI C/T-Spine 3/23/23:  No findings of osseous malignancy. No abnormal cord enhancement or cord signal abnormality.  -MRI L-Spine 3/23/23:  No findings of osseous or leptomeningeal malignancy.  -MRI Brain 3/23/23:   Similar minimal curvilinear enhancement along the posterior insula ([id number]). Similar minimal associated T2 FLAIR signal abnormality.  Minimally decreased size of previously seen focus of enhancement in the distal left IAC, currently 2.5 mm, previously 3 mm.  No new abnormal parenchymal or leptomeningeal enhancement.  -CT C/A/P 5/5/23:  Stable exam. No new disease in the chest, abdomen or pelvis.  Unchanged spiculated right apical nodule, sub-4 mm left upper lobe nodules and multiple osteosclerotic lesions.  -MRI Brain 7/24/23:  Stable vague enhancement, mild increased T2 signal, and hemosiderin deposition posterior superior aspect right insula at site of prior treated metastasis. Stable 3 mm vestibular schwannoma fundus left IAC. No new enhancing lesions identified.  -CT C/A/P 8/18/23:  Stable exam. Unchanged 1.1 cm right apical nodule, few scattered bilateral sub-4 mm pulmonary nodules, and osteosclerotic lesions.  -MRI Brain 12/1/23:  No new areas of abnormal enhancement. Increase in size of the cortical focus of increased T2 FLAIR signal within the posterior aspect of the left frontal lobe at site of a prior treated metastasis. No associated abnormal enhancement. New 4 x 3 mm region of increased T2 signal posterior left frontal lobe near the vertex with associated decreased T1 signal and without associated enhancement. Attention to these areas on follow-up recommended.  -CT C/A/P 12/1/23:  Stable 1.2 cm right apical spiculated nodular density with stable additional less than 0.4 cm nodules. No new or enlarging pulmonary nodules or consolidations. Stable appearance of sclerotic lesions throughout the visualized osseous structures. No new evidence of metastatic disease. Abnormal appearing endometrium in a patient postmenopausal, recommend pelvic ultrasound.  -TV U/S 1/3/24:  Thickened, cystic endometrium measuring up to 1.0 cm. Gynecologic consultation for tissue sampling is recommended.  -Screening Mammo/Sono 1/25/24:  No mammographic or sonographic evidence of malignancy.  -CT C/A/P 4/2/24:  Interval disease stability within the chest, abdomen, and pelvis compared to 12/1/2023:  Right apical 1.3 cm irregular lesion and right lower lobe pulmonary micronodule unchanged. Sclerotic osseous lesions unchanged. Similar-appearing thickening of the endometrium.  -MRI Brain 5/2/24:   1. No acute intracranial abnormality. No findings suspicious for new intracranial metastases. 2. Unchanged left vestibular schwannoma. 3. Mild interval progression of overall very mild white matter disease which may be the sequelae of treatment effect, chronic microvascular ischemic disease, vasculitis, chronic migraines, and/or Lyme disease among other possibilities. 4. Nonspecific progression of an ovoid T1 hypointense focus in the medial left parietal lobe. Attention on follow-up exams is advised.  -CT CAP 8/10/24:  Stable exam, with unchanged 1.2 cm right apical nodule, punctate posterior right lower lobe nodule and osteosclerotic lesions. Unchanged indeterminate endometrial thickening. No new disease in the chest, abdomen or pelvis.  -MRI head w/wo con 8/15/24:  Minimal increase in size of multiple FLAIR hyperintense, nonenhancing lesions in the bilateral precentral gyri and right medial frontoparietal white matter. Spectroscopy evaluation of the left medial precentral gyrus lesion is nonspecific. Lesions that are amenable to perfusion evaluation demonstrate no hyperperfusion. Evolving posttreatment changes are considered but continued follow-up is recommended.  -PET/CT 11/22/24:   1. Since 6/21/2021 FDG PET/CT, no significant interval change of the spiculated 1.8 cm right upper lobe lung nodule demonstrating mild FDG activity. No new hypermetabolic foci in the thorax. 2. Interval increased size of mildly avid RIGHT sclerotic sacral lesion, previously was nonavid. Other are non-FDG avid sclerotic lesions compatible for treated bone metastases are stable. 3. Persistent mild nonspecific activity within bilateral 1B cervical lymph nodes, may be inflammatory. 4. Stable nonspecific mildly avid mediastinal and bilateral hilar lymph nodes. Resolution of previously noted right hilar lymph noted activity. 5. Stable likely physiologic activity of bilateral adrenal glands. 6. Previously noted mild gastric hypermetabolism not visualized.

## 2025-01-27 NOTE — HISTORY OF PRESENT ILLNESS
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  She presented with symptomatic brain metastasis and was found to have a RUL primary tumor with intrathoracic LN, bone metastases, liver metastasis and brain metastasis.  Biopsy of the primary tumor in late Feb 2019 revealed her diagnosis.  She was treated with SRS to the brain in March 2019 with nice response.  She was started on first line Alectinib in April 2019 and achieved UT.    Patient is status post admission at [facility] from 11/27-12/4/2020 for treatment of disseminated histoplasmosis.  She was started on AmBisome with a plan to insert a PICC line however one of her blood cultures grew multiple organisms delaying the PICC line placement.  Subsequent fungal and bacterial cultures were negative.  During her hospital stay, she developed ANDREW which was felt to be secondary to the AmBisome however her alectinib was held during the hospital stay.  Abdominal ultrasound was unremarkable.  She was transfused 1 unit PRBCs prior to discharge.  She was discharged with plan to treat the histoplasmosis with oral posaconazole in the outpatient setting.  Patient has subsequently been on treatment with dose-reduced Alectinib 450mg BID since late December 2020 due to renal function.   She started Posaconazole in mid-March with the Alectinib.  Alectinib was stopped in late June 2021 due to poor tolerability with the combination.   Patient was started on Pemetrexed chemotherapy in July 2021 in effort to maintain control of her lung cancer while preserving sensitivity to ALK inhibitors and allowing her to be treated with Posaconazole for the disseminated histo.  She received 1 cycle of chemotherapy with Pemetrexed that was very poorly tolerated.  She completed roughly 1 year of Posaconazole in late April 2022.   She had repeat BM Bx in Sept 2021 showing evidence of persistent histoplasmosis and was continued on Posaconazole. Restaging PET/CT in early October with evidence of disease progression.  She restarted Alectinib 450mg BID on 10/9/21.   MRI Brain on 10/5/21 revealed two new small metastases; she had f/u with Rad-Onc and given her asymptomatic status coupled with restarting the Alectinib, the plan was to do a short-term f/u MRI at a 2 month interval in early December.    Developed return of hemolysis in November 2021.  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  Alectinib discontinued in mid-November 2021.   Started 2nd line Brigatinib in November 2021; achieved UT.  [facility] testing in July 2022 without any alterations.   [de-identified] : -Lung, RUL CT-guided FNA 2/26/19:  Positive for malignant cells.  Adenocarcinoma.  PDL1 positive at 5%.  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  [de-identified] : Patient continues on 2nd line Brigatinib since Nov 2021; achieved LA.   Patient overall feeling fairly well.  Denies F/C/N/V/D.  Has occasional myalgias which are manageable and not bothersome.  Patient's case was reviewed at thoracic tumor board in December 2024: There is felt to be no clear role for the inclusion of consolidative thoracic RT at this juncture.

## 2025-01-27 NOTE — HISTORY OF PRESENT ILLNESS
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  She presented with symptomatic brain metastasis and was found to have a RUL primary tumor with intrathoracic LN, bone metastases, liver metastasis and brain metastasis.  Biopsy of the primary tumor in late Feb 2019 revealed her diagnosis.  She was treated with SRS to the brain in March 2019 with nice response.  She was started on first line Alectinib in April 2019 and achieved IN.    Patient is status post admission at [facility] from 11/27-12/4/2020 for treatment of disseminated histoplasmosis.  She was started on AmBisome with a plan to insert a PICC line however one of her blood cultures grew multiple organisms delaying the PICC line placement.  Subsequent fungal and bacterial cultures were negative.  During her hospital stay, she developed ANDREW which was felt to be secondary to the AmBisome however her alectinib was held during the hospital stay.  Abdominal ultrasound was unremarkable.  She was transfused 1 unit PRBCs prior to discharge.  She was discharged with plan to treat the histoplasmosis with oral posaconazole in the outpatient setting.  Patient has subsequently been on treatment with dose-reduced Alectinib 450mg BID since late December 2020 due to renal function.   She started Posaconazole in mid-March with the Alectinib.  Alectinib was stopped in late June 2021 due to poor tolerability with the combination.   Patient was started on Pemetrexed chemotherapy in July 2021 in effort to maintain control of her lung cancer while preserving sensitivity to ALK inhibitors and allowing her to be treated with Posaconazole for the disseminated histo.  She received 1 cycle of chemotherapy with Pemetrexed that was very poorly tolerated.  She completed roughly 1 year of Posaconazole in late April 2022.   She had repeat BM Bx in Sept 2021 showing evidence of persistent histoplasmosis and was continued on Posaconazole. Restaging PET/CT in early October with evidence of disease progression.  She restarted Alectinib 450mg BID on 10/9/21.   MRI Brain on 10/5/21 revealed two new small metastases; she had f/u with Rad-Onc and given her asymptomatic status coupled with restarting the Alectinib, the plan was to do a short-term f/u MRI at a 2 month interval in early December.    Developed return of hemolysis in November 2021.  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  Alectinib discontinued in mid-November 2021.   Started 2nd line Brigatinib in November 2021; achieved IN.  [facility] testing in July 2022 without any alterations.   [de-identified] : -Lung, RUL CT-guided FNA 2/26/19:  Positive for malignant cells.  Adenocarcinoma.  PDL1 positive at 5%.  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  [de-identified] : Patient continues on 2nd line Brigatinib since Nov 2021; achieved OR.   Patient overall feeling fairly well.  Denies F/C/N/V/D.  Has occasional myalgias which are manageable and not bothersome.  Patient's case was reviewed at thoracic tumor board in December 2024: There is felt to be no clear role for the inclusion of consolidative thoracic RT at this juncture.

## 2025-01-27 NOTE — ASSESSMENT
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 She was treated with gamma knife RT to brain metastasis in March 2019. She started systemic therapy with Alectinib in April 2019 and achieved [place]. Alectinib dose-reduced in late December 2020 due to renal function decline possibly due to anti-fungal agents for disseminated histoplasmosis. Started treatment with oral Posaconazole for histoplasmosis in mid-March 2021. Alectinib has been poorly tolerated in combination with Posaconazole, requiring dose-reductions of Alectinib. She was intolerant of the combination. Patient with sustained response on restaging PET/CT in June 2021. Alectinib was discontinued in late June 2021 due to poor tolerability in combination with posaconazole for disseminated histoplasmosis. Treated with 1 cycle of Pemetrexed chemotherapy on 7/9/21 with very poor tolerability. Further systemic therapy for lung cancer was held from July-October 2021. Restaging PET/CT October 2021 with disease progression. Restarted Alectinib 450mg BID on 10/9/21. She increased Alectinib to full dosing in late October 2021. Developed return of hemolysis in November and Alectinib permanently discontinued on 11/15/21 due to impression of drug-induced hemolytic anemia associated with this agent. Hemolysis resolved with discontinuation of offending agent (Alectinib). [facility] ct-DNA testing 11/19/21 with no evidence of ALK resistance mutations. Started 2nd line Brigatinib in late November 2021; achieved [place]. Brain MRI Aug 2024 with suspected post-treatment changes.    Restaging PET/CT Nov 2024 with sustained response with regards to her cancer; there is mention of increased avidity in a sacral lesion, however the scan is compared with a prior scan from 2021.   Recommend: -Continue treatment with 2nd line Brigatinib targeting her ALK rearrangement for as long as it benefits the patient, currently at 180mg PO daily. -Repeat labs today. Can monitor CEA; not clear if informative. Monitor CPK, amylase/lipase while on brigatinib.  No clear role/benefit for monitoring other tumor markers.   -Anemia:  monitor hemoglobin and periodic iron studies  -Questionable Histoplasmosis in BM s/p 1 year of antifungal therapy completed April 2022. Being monitored off antifungal; BM results possibly artifactual. Previously followed with ID. -F/U with Neuro-Oncologist. Titrated off Briviact. -F/U with Rad-Onc for management of Brain metastases. To have 6-month Brain MRI in Feb 2025. -Given the previous deep response in bone metastases, avoided the addition of a bone modifying agent -F/U with PMR PRN - No clear role for inclusion of consolidative thoracic RT at this juncture - Follow-up in March with restaging body scan obtained beforehand or sooner should problems arise   - Check out form given to patient with instructions for making appointments

## 2025-01-27 NOTE — ASSESSMENT
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 She was treated with gamma knife RT to brain metastasis in March 2019. She started systemic therapy with Alectinib in April 2019 and achieved [place]. Alectinib dose-reduced in late December 2020 due to renal function decline possibly due to anti-fungal agents for disseminated histoplasmosis. Started treatment with oral Posaconazole for histoplasmosis in mid-March 2021. Alectinib has been poorly tolerated in combination with Posaconazole, requiring dose-reductions of Alectinib. She was intolerant of the combination. Patient with sustained response on restaging PET/CT in June 2021. Alectinib was discontinued in late June 2021 due to poor tolerability in combination with posaconazole for disseminated histoplasmosis. Treated with 1 cycle of Pemetrexed chemotherapy on 7/9/21 with very poor tolerability. Further systemic therapy for lung cancer was held from July-October 2021. Restaging PET/CT October 2021 with disease progression. Restarted Alectinib 450mg BID on 10/9/21. She increased Alectinib to full dosing in late October 2021. Developed return of hemolysis in November and Alectinib permanently discontinued on 11/15/21 due to impression of drug-induced hemolytic anemia associated with this agent. Hemolysis resolved with discontinuation of offending agent (Alectinib). [facility] ct-DNA testing 11/19/21 with no evidence of ALK resistance mutations. Started 2nd line Brigatinib in late November 2021; achieved [place]. Brain MRI Aug 2024 with suspected post-treatment changes.    Restaging PET/CT Nov 2024 with sustained response with regards to her cancer; there is mention of increased avidity in a sacral lesion, however the scan is compared with a prior scan from 2021.   Recommend: -Continue treatment with 2nd line Brigatinib targeting her ALK rearrangement for as long as it benefits the patient, currently at 180mg PO daily. -Repeat labs today. Can monitor CEA; not clear if informative. Monitor CPK, amylase/lipase while on brigatinib.  No clear role/benefit for monitoring other tumor markers.   -Anemia:  monitor hemoglobin and periodic iron studies  -Questionable Histoplasmosis in BM s/p 1 year of antifungal therapy completed April 2022. Being monitored off antifungal; BM results possibly artifactual. Previously followed with ID. -F/U with Neuro-Oncologist. Titrated off Briviact. -F/U with Rad-Onc for management of Brain metastases. To have 6-month Brain MRI in Feb 2025. -Given the previous deep response in bone metastases, avoided the addition of a bone modifying agent -F/U with PMR PRN - No clear role for inclusion of consolidative thoracic RT at this juncture - Follow-up in March with restaging body scan obtained beforehand or sooner should problems arise   - Check out form given to patient with instructions for making appointments

## 2025-02-23 NOTE — PHYSICAL EXAM
[General Appearance - Well Developed] : well developed [] : no respiratory distress [Heart Rate And Rhythm] : heart rate and rhythm were normal [Exaggerated Use Of Accessory Muscles For Inspiration] : no accessory muscle use [No Focal Deficits] : no focal deficits [Motor Exam] : the motor exam was normal [Sensation] : the sensory exam was normal to light touch and pinprick [Oriented To Time, Place, And Person] : oriented to person, place, and time

## 2025-02-23 NOTE — PHYSICAL EXAM
[General Appearance - Well Developed] : well developed [] : no respiratory distress [Exaggerated Use Of Accessory Muscles For Inspiration] : no accessory muscle use [Heart Rate And Rhythm] : heart rate and rhythm were normal [No Focal Deficits] : no focal deficits [Motor Exam] : the motor exam was normal [Sensation] : the sensory exam was normal to light touch and pinprick [Oriented To Time, Place, And Person] : oriented to person, place, and time

## 2025-02-26 NOTE — HISTORY OF PRESENT ILLNESS
[FreeTextEntry1] : Ms Fields has a h/o metastatic NSCLC with ALK rearrangement who presented with a single brain metastasis measuring 1.4 cm in size in the right inferior frontal gyrus. She completed gamma knife radiation therapy for a total of 2000 cGy to the right parietal area on 3/13/19.  ONCOLOGY HISTORY She presented to me as a 49 year old female with PMH of rheumatoid arthritis, who presented to [place] on 2/20 from OSH with complaints of left sided coolness. She had previously had imaging outside which revealed RUL spiculated mass, sclerotic lesion in T11, as well as 1.4 cm enhancing nodule in insula cortex with vasogenic edema and midline shift. MRI brain from 2/25/19 showed Right inferior frontal gyrus nodule consistent with a primary versus secondary neoplasm, 1.4 X 1.4 X 1.3 cm.  Punctate nodular enhancement along the distal left seventh eighth nerve complex may represent a tiny vestibular schwannoma or leptomeningeal disease. Plan made for SRS.  Pathology from right upper lobe CT guided FNA from 2/26/19 revealed adenocarcinoma, PDL1+ at 5%. She received GK in March of 2019.  She was found to have an ALK mutation and started on Alcensa.    10/2/19- Ms. Fields presents today for follow up. She underwent a brain MRI on 10/1/19 which showed no new disease. Today she is feeling well. She has some bradycardia. Denies headaches, focal weakness, numbness, tingling, nausea, vomiting. Recently back to work. Will have PET scan this coming Friday.  12/4/19- Ms. Fields presents today for follow up. She underwent a brain MRI as an inpatient on 11/27/19 which showed Enhancing lesion in the right frontal operculum just medial to the sylvian fissure is larger when compared to 10/1/2019 but is significantly smaller compared with 3/13/2019 and may represent neoplasm versus post therapy changes. No change tiny enhancing nodule left internal auditory canal. 4.5 mm in AP diameter x 6.2 mm transversely.She notably presented to [facility] on 11/27/19 with 3 episodes of left face, hand, and leg. SHe was discharged to follow up with us. She follows with . Will be seeing Dr. Salinas this week to establish care. Today she denies headaches, focal weakness, nausea, vomiting. She is on Keppra 500 BID. Not on any steroids. Will see Dr. Garcia. She has not had any further feelings of tingling to her left face and left arm. Continues on Alectinib, was dose reduced for a bit of time due to bradycardia.  Denies trouble with balance. Intermittently has a slight sensation, indescribable, to the left side of her face around her mouth, which she has previously experienced when starting Alectinib.   1/15/20- Ms. Fields presents today for follow up. She underwent an MRI with spectroscopy and perfusion this afternoon. This  CT CAP 1/7/20 showed Spiculated appearing mass in the right upper lobe, consistent with patient's  known lung cancer. Scattered sclerotic osseous lesions may represent metastatic disease. These are likely progressed from prior.  Today she is feeling well. Continues on Alectinib. Denies headaches, confusion, nausea, vomiting, No further seizures. Now on briviact for seizure management. Now following with neuro oncologist Dr. Karis Reyes.   MRI interpreted as:  Impression: Enhancement in the right basal ganglia frontal operculum larger compared with 11/27/2019 is suspicious for progression of neoplasm. MR spectroscopy does not demonstrate increased choline to creatine, against tumor recurrence. MR perfusion demonstrates decreased blood pool suggesting post therapy change. Recommend close clinical follow-up.  03/5/2020- Ms. Fields presents today for follow up. She recently was found to have increased LFTs and a new anemia with HGB 6.7. Alectinib was held. MRI brain from 2/27/2020 showed a similar appearing enhancing right subinsular and right frontal opercular lesion. Decreased edema in the larger right subinsular lesion.   PET scan 1/31/2020 showed mildly FDG avid RUL lung mass was decreased in size and metabolism when compared to 10/4/2020. There was resolution of a small mildly FDG avid right paratracheal node. Non FDG avid sclerotic lesions are compatible with treated bone mets. Today she denies headaches, focal weakness, numbness, tingling, nausea vomiting. No seizures. Last dose of steroids was this morning. Notes swelling in her legs has returned recently. Feeling extremely cold and tired.   05/28/20- Ms. Fields is seen today via Telehealth platform in follow up. Verbal consent obtained. PET/CT 05/04/20 noted mildly FDG-avid spiculated right upper lobe lung mass is unchanged on CT, and minimally increased in metabolism as compared to prior study dated 1/31/2020. Etiology is indeterminate. Small focus of residual disease is not excluded. New small hypermetabolic right perihilar and right paratracheal lymph nodes are nonspecific.  Non- FDG-avid sclerotic lesions, unchanged, compatible with treated bone metastases. She saw Dr. Humphreys on 03/20/20 with plans for bone marrow biopsy for her hemolytic anemia. She was discontinued on Alectinib due to anemia and was restarted again by Dr. Silveira. She continues to take  Briviact for seizure.  She saw Dr. Salinas on 05/08/20 with follow up plans in June.MRI brain 5/27/2020 showed Unchanged size of enhancing lesion in the right subinsular region with increased surrounding vasogeni c edema. Continued close interval follow-up is recommended. Today she feels fine. Denies headaches, nausea,  dizziness, vision or auditory changes, no weakness, numbness or tingling. On Alectinib.   8/4/20 She presents for follow up 7/30/20 MRI showed abnormal enhancing lesion with surrounding edema is again identified though decreased size and surrounding edema seen when compared prior exam.  Today she feels mostly well. Denies headaches, nausea, vomiting. Has not had any further tingling or tickling sensations on her face. Recently stopped her antifungals as they caused her SOB and edema, and this has helped. Continues on alectinib. Bone marrow biopsy for her anemia found to be consistent with histoplasmosis.    11/4/20: She presents for f/u with most recent MRI from 10/30/20 showing  decreasing enhancement and surrounding FLAIR signal abnormality along the inferior right frontal lobe consistent with response to treatment. No new lesions are identified.  3/4/2021- Ms. Fields presents today for follow up.  She continues to follow with Dr. Salinas. Continues on alectinib dose reduced after ANDREW treatment in 12/2020. Has not yet started posaconazole for histoplasmosis.   MR brain 2/26/2021 showed Small focus of enhancement in the right frontal lobe without significant interval change from the most recent exam compatible with a treated metastasis. 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  PET scan 2/12/2021 showed  1. Mildly FDG-avid spiculated right upper lobe nodule is unchanged in size and metabolism, compatible with treated disease. 2. Few new small FDG-avid right axillary lymph nodes are nonspecific, probably reactive. Further evaluation may be performed with ultrasound. Nonspecific small FDG-avid mediastinal and bilateral hilar lymph nodes, unchanged in number and avidity. Right paratracheal node demonstrates new calcification. 3. Probable physiologic FDG activity, bilateral adrenal glands, unchanged. 4. Mild FDG activity associated with fractures in right 11th and 12th ribs, decreased as compared to prior study. Few non-FDG-avid sclerotic lesions, unchanged, compatible with treated bone metastases.  Today she feels very well. Had a headache after her cataract surgery, but none since. No seizures, no focal weakness.  7/15/2021- Ms. Fields presents today for follow up. She was admitted 6/29-7/1 with left arm, left face, and left chest discomfort. EEG normal. MRI head 7/1/2021 showed  1.  No new lesions are identified. 2.  Stable appearance of a previously treated metastatic lesion as described. 3.  Stable 3 mm enhancing lesion in the left internal auditory canal which likely represents a vestibular schwannoma.  Alectinib held upon discharge. discharged on briviact 25mg in AM, 50mg in PM.  She started pemtrexed on 7/9.  Notably PET scan on 6/21/2021 showed  1. Mildly FDG avid spiculated right upper lobe nodule is unchanged in size and metabolism, compatible with treated disease. 2. Nonspecific new mild FDG avidity of bilateral level Ib cervical lymph nodes which have minimally increased in size. Please correlate clinically and with ultrasound for further evaluation as indicated. 3. Interval decrease in size and FDG avidity of nonspecific right axillary lymph nodes. Nonspecific small FDG avid mediastinal and bilateral hilar lymph nodes, unchanged in number and avidity. 4. Probable physiologic FDG activity, bilateral adrenal glands, not significantly changed. 5. Interval further decreased FDG activity associated with fractures in the right 11th-12th ribs. A few non-FDG avid sclerotic lesions, unchanged, compatible with treated bone metastasis. 6. Unchanged mild gastric hypermetabolism. Please correlate clinically for gastritis.  Today she is frustrated regarding recent tightness to her left arm, face, chest, and jaw. She notes this has been worse lately since stopping alectinib and continuing on posaconazole with her new chemotherapy. She does not have this symptom when she does not take posaconazle.   10/14/2021 - Patient presents for followup. Interval MRI on 10/05 shows 2 small enhancing lesions which are new since the prior exam of 7/1/2021 suspicious for metastasis. A 5 mm focus is in the right frontal lobe along the motor strip and a 3 mm focus is in the left frontal lobe in the subcortical white matter of the precentral gyrus.  Recent bone marrow biopsy showed histoplasmosis as well as AdenoCa. Recent body imaging PET from 10/05 showing new or progressive disease at RUL, mediastinum and pelvic bones.  She has had difficulty with tolerating her alectinib and has been on varying doses over the previous year most recently having held it all together until this past week.    Restarted Alectinib last Saturday due to POD.   12/09/2021:  Patient reports today for regular follow up.  Her last MRI was prior to her 10/14 appt, which showed the presence of a 5 mm focus in the right frontal lobe along the motor strip and a 3 mm focus in the left frontal lobe in the subcortical white matter of the precentral gyrus. MRI brain 12/5/21 as per our read shows resolution of these lesions.  Saw Dr. Salinas 11/19/21, with Alectinib discontinued d/t concern of hemolytic anemia. Of note, on 12/7/21  recently presented to [facility] ED with hemoptysis, and CTA done negative for PE. Today she reports feeling well. Denies dizziness, nausea, vomiting. Continues to take poscanozole and alunbrig which she feels is elevating her blood pressure and increasing pressure in the head.   2/17/2022: Pt presents for follow-up. Reports occasional dizziness, fatigue, and lightheadedness, but unsure if due to changes in BP medication. Notes the adjustment in medications has helped HA (ongoing for some months). Occasionally has blurred vision when trying to read and write. Continues on Brigatinib, and Posaconazole (histoplasmosis)  5/18/2022- Ms. Fields presents today for follow up.  Brain MRI done 5/13/2022 showed 1. No evidence of recurrent metastatic disease. 2. A punctate enhancing focus and associated susceptibility artifact in the ventral posterior aspect of the right frontal lobe, compatible with sequela of treated metastasis, is stable. 3. 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  Continues on brigatinib.  Notes some pain to crown of head extending from bilateral shoulders over the past few months. shoulders get sore at the end of the day.  8/25/2022- Ms. Fields presents today for follow up. continues to follow with Dr. Salinas. Completed posiconazole in 4/2022. Continues on brigatinib.  CT CAP 7/22/2022 showed New patchy groundglass opacity in the right lower lobe, likely infectious/inflammatory. Enlarging indeterminate mediastinal and hilar lymphadenopathy. Follow-up chest CT in 3 months or as per clinical protocol. Stable spiculated right apical nodule and osteosclerotic metastases. No new disease in the abdomen or pelvis.  [facility] MRI 8/22/2022 showed No evidence of intracranial metastatic disease. Resolution of previously seen precentral gyrus subcentimeter enhancing metastases. A focal area of T2 hyperintensity is seen in the region of the previously seen left-sided enhancing lesion likely representing the sequelae of prior radiation therapy. Continued follow-up is advised. Stable left internal auditory canal vestibular schwannoma. Stable area of enhancement with associated susceptibility artifact within the posterior right frontal lobe compatible with sequela of treated metastasis.  feeling very well overall no headaches, no nausea, no focal weakness, no numbness or tingling.  11/30/2022- Ms. Fields presents today for follow up. MRI brain done 11/23/2022 showed * STABLE VAGUE ENHANCEMENT, MILD INCREASED T2 SIGNAL, AND HEMOSIDERIN DEPOSITION POSTERIOR SUPERIOR ASPECT RIGHT INSULA AT SITE OF PRIOR TREATED METASTASIS. 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  * NO NEW ENHANCING LESIONS IDENTIFIED.  Continues to follow with Dr. Salinas. continues on brigatinib.  CT CAP 10/21/2022 showed Since 7/22/2022.  Stable right upper lobe spiculated right upper lobe nodule.  Stable mildly enlarged hilar lymph nodes (although increased compared to 4/22/2022).  Stable sclerotic bone lesions. No finding to suggest new or progressive disease.  Overall feeling well. no headaches, no nausea, no focal weakness, no numbness or tingling. sometimes gets muscle pains.   3/30/2023- Ms. Fields presents today for follow up. SHe has contineud to follow with Dr. Salinas. continues on second line brigatinib. Brain MRI and total spine MRI done at request of neuro oncologist done 3/23/2023.   MRI brain 3/23/2023 showed  Similar minimal curvilinear enhancement along the posterior insula (35-92). Similar minimal associated T2 FLAIR signal abnormality. Minimally decreased size of previously seen focus of enhancement in the distal left IAC, currently 2.5 mm, previously 3 mm No new abnormal parenchymal or leptomeningeal enhancement.  MRI total spine 3/23/2023 showed No findings of osseous malignancy. No abnormal cord enhancement or cord signal abnormality.  CT CAP 1/20/2023 showed  Ill-defined nodular opacity in the right upper lobe is unchanged when compared to previous exam. No significant change in the sclerotic focus in the T11 vertebral body when compared to previous exam.  feels well today. no headaches, no nausea, no focal weakness. no longer having back pain.   Visit dated 8/2/2023 Patient returns for routine f/u with images for review. Reports doing well with no new neurological deficits to offer. Continues to follow with Dr. Salinas on 2nd line Brigatinib  MRI brain w w/o contrast 7/24/2023 IMPRESSION: Stable vague enhancement, mild increased T2 signal, and hemosiderin deposition posterior superior aspect right insula at site of prior treated metastasis. Stable 3 mm vestibular schwannoma fundus left IAC. No new enhancing lesions identified.  CT C/A/P 5/5/2023 IMPRESSION: Stable exam. No new disease in the chest, abdomen or pelvis. Unchanged spiculated right apical nodule, sub-4 mm left upper lobe nodules and multiple osteosclerotic lesions.  Visit dated 12/7/2023 Patient returns for routine follow up to include progress check and review of completed cranial images. Denies N/V, HA/unilateral extremity weakness/memory changes/gait disturbance/bowel/bladder dysfunction or other neurologic symptoms. No issues with speech or comprehension. Denies seizures  Continues to follow with Dr. Salinas on 2nd line Brigatinib targeting her ALK rearrangement for as long as it benefits the patient, currently at 180mg PO (per heme/onc notes)  MRI brain w w/o contrast 12/1/2023 IMPRESSION: No new areas of abnormal enhancement. Increase in size of the cortical focus of increased T2 FLAIR signal within the posterior aspect of the left frontal lobe at site of a prior treated metastasis. No associated abnormal enhancement. New 4 x 3 mm region of increased T2 signal posterior left frontal lobe near the vertex with associated decreased T1 signal and without associated enhancement. Attention to these areas on follow-up recommended.   CT C/A/P 12/1/2023  final read not available at time of this entry  VISIT dated 5/8/2024 Patient presents for routine f/u and progress check with cranial images for review. Reports doing well overall w/o any new neurological findings. Baseline muscle aches remain persistent but doesn't limit participation in activity attributes to current treatment regimen. Denies N/V, HA/unilateral extremity weakness/memory changes/gait disturbance/bowel/bladder dysfunction or other neurologic symptoms. No issues with speech or comprehension.  Sees neurologist as well.  Continues to follow with Dr. Salinas on 2nd line Brigatinib targeting her ALK rearrangement since late November 2021.  CT C/A/P 4/2/2024 IMPRESSION: Interval disease stability within the chest, abdomen, and pelvis compared to 12/1/2023: Right apical 1.3 cm irregular lesion and right lower lobe pulmonary micronodule unchanged. Sclerotic osseous lesions unchanged. Similar-appearing thickening of the endometrium.  MRI brain w w/o contrast 5/2/2024 IMPRESSION: 1. No acute intracranial abnormality. No findings suspicious for new intracranial metastases. 2. Unchanged left vestibular schwannoma. 3. Mild interval progression of overall very mild white matter disease which may be the sequelae of treatment effect, chronic microvascular ischemic disease, vasculitis, chronic migraines, and/or Lyme disease among other possibilities. 4. Nonspecific progression of an ovoid T1 hypointense focus in the medial left parietal lobe. Attention on follow-up exams is advised.   VISIT DATED: 8/29/2024 Patient returns for progress check and evaluation. Cranial images have been completed as recommended to further assess and r/o POD which was of concern on MRI in May 2024 Continues to follow with Dr. Salinas on 2nd line Brigatinib 180mg daily targeting her ALK rearrangement since late November 2021. Patient denies headache, dizziness, nausea or vomiting, vision changes and has stable gait. She follow up with Dr. Bernard on 8/16/24 who informed her that there is no changes and everything is stable. MRI on 8/15/24, awaiting final report.  CT C/A/P w w/o contrast 8/10/2024 IMPRESSION: Stable exam, with unchanged 1.2 cm right apical nodule, punctate posterior right lower lobe nodule and osteosclerotic lesions. Unchanged indeterminate endometrial thickening. No new disease in the chest, abdomen or pelvis.  MRI brain with SPECT 8/15/2024 final read pending  VISIT DATED: 2/26/2025 Ms. Fields presents for routine f/u and progress check with cranial images for review. States doing well overall but is a little upset today after a misunderstanding with her spouse. Notes mild shoulder discomfort which varies in intensity with temperature changes, but she has not need to utilize medications. Denies N/V, HA/RIGHT side weakness/memory changes/gait disturbance/bowel/bladder dysfunction or other neurologic symptoms. No issues with speech or comprehension.  Follows with neuro/onc @ [facility] Continues to follow with Dr. Salinas on 2nd line Brigatinib targeting her ALK rearrangement since late November 2021. Last PETct 11/2024 with Interval increased size of mildly avid RIGHT sclerotic sacral lesion, previously was nonavid. Other are non-FDG avid sclerotic lesions compatible for treated bone metastases are stable. Otherwise, stable findings. Plans for CT C/A/P scheduled for 3/8/2025   MRI brain w w/o contrast 2/15/2025 IMPRESSION: Interval mild increase in 1.5 cm T2 FLAIR hyperintense, non-enhancing lesion in the left medial precentral gyrus compared with 8/15/2024. Additional T2 FLAIR hyperintense, nonenhancing lesions in the right medial frontoparietal white matter and the bilateral precentral gyri are not significantly changed compared to 8/15/2024 No new enhancing lesions identified.

## 2025-02-26 NOTE — REVIEW OF SYSTEMS
[Cough] : cough [Negative] : Neurological [Dizziness: Grade 0] : Dizziness: Grade 0  [Headache: Grade 0] : Headache: Grade 0 [Peripheral Sensory Neuropathy: Grade 0] : Peripheral Sensory Neuropathy: Grade 0 [Shortness Of Breath] : no shortness of breath [Confused] : no confusion [Dizziness] : no dizziness [Fainting] : no fainting [Difficulty Walking] : no difficulty walking [FreeTextEntry3] : right eye blurry vision attributes to cataract (had left cataract sx) [de-identified] : Denies HA/gait disturbance [de-identified] : RLLAVELLE

## 2025-02-26 NOTE — REVIEW OF SYSTEMS
[Cough] : cough [Negative] : Neurological [Dizziness: Grade 0] : Dizziness: Grade 0  [Headache: Grade 0] : Headache: Grade 0 [Peripheral Sensory Neuropathy: Grade 0] : Peripheral Sensory Neuropathy: Grade 0 [Shortness Of Breath] : no shortness of breath [Confused] : no confusion [Dizziness] : no dizziness [Fainting] : no fainting [Difficulty Walking] : no difficulty walking [FreeTextEntry3] : right eye blurry vision attributes to cataract (had left cataract sx) [de-identified] : Denies HA/gait disturbance [de-identified] : RLLAVELLE

## 2025-02-26 NOTE — VITALS
[Maximal Pain Intensity: 0/10] : 0/10 [Least Pain Intensity: 0/10] : 0/10 [Pain Location: ___] : Pain Location: [unfilled] [ECOG Performance Status: 1 - Restricted in physically strenuous activity but ambulatory and able to carry out work of a light or sedentary nature] : Performance Status: 1 - Restricted in physically strenuous activity but ambulatory and able to carry out work of a light or sedentary nature, e.g., light house work, office work [Maximal Pain Intensity: 4/10] : 4/10 [Least Pain Intensity: 1/10] : 1/10 [90: Able to carry normal activity; minor signs or symptoms of disease.] : 90: Able to carry normal activity; minor signs or symptoms of disease.

## 2025-02-26 NOTE — REVIEW OF SYSTEMS
[Cough] : cough [Negative] : Neurological [Dizziness: Grade 0] : Dizziness: Grade 0  [Headache: Grade 0] : Headache: Grade 0 [Peripheral Sensory Neuropathy: Grade 0] : Peripheral Sensory Neuropathy: Grade 0 [Shortness Of Breath] : no shortness of breath [Confused] : no confusion [Dizziness] : no dizziness [Fainting] : no fainting [Difficulty Walking] : no difficulty walking [FreeTextEntry3] : right eye blurry vision attributes to cataract (had left cataract sx) [de-identified] : Denies HA/gait disturbance [de-identified] : RLLAVELLE

## 2025-02-26 NOTE — HISTORY OF PRESENT ILLNESS
[FreeTextEntry1] : Ms Fields has a h/o metastatic NSCLC with ALK rearrangement who presented with a single brain metastasis measuring 1.4 cm in size in the right inferior frontal gyrus. She completed gamma knife radiation therapy for a total of 2000 cGy to the right parietal area on 3/13/19.  ONCOLOGY HISTORY She presented to me as a 49 year old female with PMH of rheumatoid arthritis, who presented to [facility] [place] on 2/20 from OSH with complaints of left sided coolness. She had previously had imaging outside which revealed RUL spiculated mass, sclerotic lesion in T11, as well as 1.4 cm enhancing nodule in insula cortex with vasogenic edema and midline shift. MRI brain from 2/25/19 showed Right inferior frontal gyrus nodule consistent with a primary versus secondary neoplasm, 1.4 X 1.4 X 1.3 cm.  Punctate nodular enhancement along the distal left seventh eighth nerve complex may represent a tiny vestibular schwannoma or leptomeningeal disease. Plan made for SRS.  Pathology from right upper lobe CT guided FNA from 2/26/19 revealed adenocarcinoma, PDL1+ at 5%. She received GK in March of 2019.  She was found to have an ALK mutation and started on Alcensa.    10/2/19- Ms. Fields presents today for follow up. She underwent a brain MRI on 10/1/19 which showed no new disease. Today she is feeling well. She has some bradycardia. Denies headaches, focal weakness, numbness, tingling, nausea, vomiting. Recently back to work. Will have PET scan this coming Friday.  12/4/19- Ms. Fields presents today for follow up. She underwent a brain MRI as an inpatient on 11/27/19 which showed Enhancing lesion in the right frontal operculum just medial to the sylvian fissure is larger when compared to 10/1/2019 but is significantly smaller compared with 3/13/2019 and may represent neoplasm versus post therapy changes. No change tiny enhancing nodule left internal auditory canal. 4.5 mm in AP diameter x 6.2 mm transversely.She notably presented to [facility] on 11/27/19 with 3 episodes of left face, hand, and leg. SHe was discharged to follow up with us. She follows with . Will be seeing Dr. Salinas this week to establish care. Today she denies headaches, focal weakness, nausea, vomiting. She is on Keppra 500 BID. Not on any steroids. Will see Dr. Garcia. She has not had any further feelings of tingling to her left face and left arm. Continues on Alectinib, was dose reduced for a bit of time due to bradycardia.  Denies trouble with balance. Intermittently has a slight sensation, indescribable, to the left side of her face around her mouth, which she has previously experienced when starting Alectinib.   1/15/20- Ms. Fields presents today for follow up. She underwent an MRI with spectroscopy and perfusion this afternoon. This  CT CAP 1/7/20 showed Spiculated appearing mass in the right upper lobe, consistent with patient's  known lung cancer. Scattered sclerotic osseous lesions may represent metastatic disease. These are likely progressed from prior.  Today she is feeling well. Continues on Alectinib. Denies headaches, confusion, nausea, vomiting, No further seizures. Now on briviact for seizure management. Now following with neuro oncologist Dr. Karis Reyes.   MRI interpreted as:  Impression: Enhancement in the right basal ganglia frontal operculum larger compared with 11/27/2019 is suspicious for progression of neoplasm. MR spectroscopy does not demonstrate increased choline to creatine, against tumor recurrence. MR perfusion demonstrates decreased blood pool suggesting post therapy change. Recommend close clinical follow-up.  03/5/2020- Ms. Fields presents today for follow up. She recently was found to have increased LFTs and a new anemia with HGB 6.7. Alectinib was held. MRI brain from 2/27/2020 showed a similar appearing enhancing right subinsular and right frontal opercular lesion. Decreased edema in the larger right subinsular lesion.   PET scan 1/31/2020 showed mildly FDG avid RUL lung mass was decreased in size and metabolism when compared to 10/4/2020. There was resolution of a small mildly FDG avid right paratracheal node. Non FDG avid sclerotic lesions are compatible with treated bone mets. Today she denies headaches, focal weakness, numbness, tingling, nausea vomiting. No seizures. Last dose of steroids was this morning. Notes swelling in her legs has returned recently. Feeling extremely cold and tired.   05/28/20- Ms. Fields is seen today via Telehealth platform in follow up. Verbal consent obtained. PET/CT 05/04/20 noted mildly FDG-avid spiculated right upper lobe lung mass is unchanged on CT, and minimally increased in metabolism as compared to prior study dated 1/31/2020. Etiology is indeterminate. Small focus of residual disease is not excluded. New small hypermetabolic right perihilar and right paratracheal lymph nodes are nonspecific.  Non- FDG-avid sclerotic lesions, unchanged, compatible with treated bone metastases. She saw Dr. Humphreys on 03/20/20 with plans for bone marrow biopsy for her hemolytic anemia. She was discontinued on Alectinib due to anemia and was restarted again by Dr. Silveira. She continues to take  Briviact for seizure.  She saw Dr. Salinas on 05/08/20 with follow up plans in June.MRI brain 5/27/2020 showed Unchanged size of enhancing lesion in the right subinsular region with increased surrounding vasogeni c edema. Continued close interval follow-up is recommended. Today she feels fine. Denies headaches, nausea,  dizziness, vision or auditory changes, no weakness, numbness or tingling. On Alectinib.   8/4/20 She presents for follow up 7/30/20 MRI showed abnormal enhancing lesion with surrounding edema is again identified though decreased size and surrounding edema seen when compared prior exam.  Today she feels mostly well. Denies headaches, nausea, vomiting. Has not had any further tingling or tickling sensations on her face. Recently stopped her antifungals as they caused her SOB and edema, and this has helped. Continues on alectinib. Bone marrow biopsy for her anemia found to be consistent with histoplasmosis.    11/4/20: She presents for f/u with most recent MRI from 10/30/20 showing  decreasing enhancement and surrounding FLAIR signal abnormality along the inferior right frontal lobe consistent with response to treatment. No new lesions are identified.  3/4/2021- Ms. Fields presents today for follow up.  She continues to follow with Dr. Salinas. Continues on alectinib dose reduced after ANDREW treatment in 12/2020. Has not yet started posaconazole for histoplasmosis.   MR brain 2/26/2021 showed Small focus of enhancement in the right frontal lobe without significant interval change from the most recent exam compatible with a treated metastasis. 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  PET scan 2/12/2021 showed  1. Mildly FDG-avid spiculated right upper lobe nodule is unchanged in size and metabolism, compatible with treated disease. 2. Few new small FDG-avid right axillary lymph nodes are nonspecific, probably reactive. Further evaluation may be performed with ultrasound. Nonspecific small FDG-avid mediastinal and bilateral hilar lymph nodes, unchanged in number and avidity. Right paratracheal node demonstrates new calcification. 3. Probable physiologic FDG activity, bilateral adrenal glands, unchanged. 4. Mild FDG activity associated with fractures in right 11th and 12th ribs, decreased as compared to prior study. Few non-FDG-avid sclerotic lesions, unchanged, compatible with treated bone metastases.  Today she feels very well. Had a headache after her cataract surgery, but none since. No seizures, no focal weakness.  7/15/2021- Ms. Fields presents today for follow up. She was admitted 6/29-7/1 with left arm, left face, and left chest discomfort. EEG normal. MRI head 7/1/2021 showed  1.  No new lesions are identified. 2.  Stable appearance of a previously treated metastatic lesion as described. 3.  Stable 3 mm enhancing lesion in the left internal auditory canal which likely represents a vestibular schwannoma.  Alectinib held upon discharge. discharged on briviact 25mg in AM, 50mg in PM.  She started pemtrexed on 7/9.  Notably PET scan on 6/21/2021 showed  1. Mildly FDG avid spiculated right upper lobe nodule is unchanged in size and metabolism, compatible with treated disease. 2. Nonspecific new mild FDG avidity of bilateral level Ib cervical lymph nodes which have minimally increased in size. Please correlate clinically and with ultrasound for further evaluation as indicated. 3. Interval decrease in size and FDG avidity of nonspecific right axillary lymph nodes. Nonspecific small FDG avid mediastinal and bilateral hilar lymph nodes, unchanged in number and avidity. 4. Probable physiologic FDG activity, bilateral adrenal glands, not significantly changed. 5. Interval further decreased FDG activity associated with fractures in the right 11th-12th ribs. A few non-FDG avid sclerotic lesions, unchanged, compatible with treated bone metastasis. 6. Unchanged mild gastric hypermetabolism. Please correlate clinically for gastritis.  Today she is frustrated regarding recent tightness to her left arm, face, chest, and jaw. She notes this has been worse lately since stopping alectinib and continuing on posaconazole with her new chemotherapy. She does not have this symptom when she does not take posaconazle.   10/14/2021 - Patient presents for followup. Interval MRI on 10/05 shows 2 small enhancing lesions which are new since the prior exam of 7/1/2021 suspicious for metastasis. A 5 mm focus is in the right frontal lobe along the motor strip and a 3 mm focus is in the left frontal lobe in the subcortical white matter of the precentral gyrus.  Recent bone marrow biopsy showed histoplasmosis as well as AdenoCa. Recent body imaging PET from 10/05 showing new or progressive disease at RUL, mediastinum and pelvic bones.  She has had difficulty with tolerating her alectinib and has been on varying doses over the previous year most recently having held it all together until this past week.    Restarted Alectinib last Saturday due to POD.   12/09/2021:  Patient reports today for regular follow up.  Her last MRI was prior to her 10/14 appt, which showed the presence of a 5 mm focus in the right frontal lobe along the motor strip and a 3 mm focus in the left frontal lobe in the subcortical white matter of the precentral gyrus. MRI brain 12/5/21 as per our read shows resolution of these lesions.  Saw Dr. Salinas 11/19/21, with Alectinib discontinued d/t concern of hemolytic anemia. Of note, on 12/7/21  recently presented to [facility] ED with hemoptysis, and CTA done negative for PE. Today she reports feeling well. Denies dizziness, nausea, vomiting. Continues to take poscanozole and alunbrig which she feels is elevating her blood pressure and increasing pressure in the head.   2/17/2022: Pt presents for follow-up. Reports occasional dizziness, fatigue, and lightheadedness, but unsure if due to changes in BP medication. Notes the adjustment in medications has helped HA (ongoing for some months). Occasionally has blurred vision when trying to read and write. Continues on Brigatinib, and Posaconazole (histoplasmosis)  5/18/2022- Ms. Fields presents today for follow up.  Brain MRI done 5/13/2022 showed 1. No evidence of recurrent metastatic disease. 2. A punctate enhancing focus and associated susceptibility artifact in the ventral posterior aspect of the right frontal lobe, compatible with sequela of treated metastasis, is stable. 3. 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  Continues on brigatinib.  Notes some pain to crown of head extending from bilateral shoulders over the past few months. shoulders get sore at the end of the day.  8/25/2022- Ms. Fields presents today for follow up. continues to follow with Dr. Salinas. Completed posiconazole in 4/2022. Continues on brigatinib.  CT CAP 7/22/2022 showed New patchy groundglass opacity in the right lower lobe, likely infectious/inflammatory. Enlarging indeterminate mediastinal and hilar lymphadenopathy. Follow-up chest CT in 3 months or as per clinical protocol. Stable spiculated right apical nodule and osteosclerotic metastases. No new disease in the abdomen or pelvis.  [facility] MRI 8/22/2022 showed No evidence of intracranial metastatic disease. Resolution of previously seen precentral gyrus subcentimeter enhancing metastases. A focal area of T2 hyperintensity is seen in the region of the previously seen left-sided enhancing lesion likely representing the sequelae of prior radiation therapy. Continued follow-up is advised. Stable left internal auditory canal vestibular schwannoma. Stable area of enhancement with associated susceptibility artifact within the posterior right frontal lobe compatible with sequela of treated metastasis.  feeling very well overall no headaches, no nausea, no focal weakness, no numbness or tingling.  11/30/2022- Ms. Fields presents today for follow up. MRI brain done 11/23/2022 showed * STABLE VAGUE ENHANCEMENT, MILD INCREASED T2 SIGNAL, AND HEMOSIDERIN DEPOSITION POSTERIOR SUPERIOR ASPECT RIGHT INSULA AT SITE OF PRIOR TREATED METASTASIS. 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  * NO NEW ENHANCING LESIONS IDENTIFIED.  Continues to follow with Dr. Salinas. continues on brigatinib.  CT CAP 10/21/2022 showed Since 7/22/2022.  Stable right upper lobe spiculated right upper lobe nodule.  Stable mildly enlarged hilar lymph nodes (although increased compared to 4/22/2022).  Stable sclerotic bone lesions. No finding to suggest new or progressive disease.  Overall feeling well. no headaches, no nausea, no focal weakness, no numbness or tingling. sometimes gets muscle pains.   3/30/2023- Ms. Fields presents today for follow up. SHe has contineud to follow with Dr. Salinas. continues on second line brigatinib. Brain MRI and total spine MRI done at request of neuro oncologist done 3/23/2023.   MRI brain 3/23/2023 showed  Similar minimal curvilinear enhancement along the posterior insula ([id number]). Similar minimal associated T2 FLAIR signal abnormality. Minimally decreased size of previously seen focus of enhancement in the distal left IAC, currently 2.5 mm, previously 3 mm No new abnormal parenchymal or leptomeningeal enhancement.  MRI total spine 3/23/2023 showed No findings of osseous malignancy. No abnormal cord enhancement or cord signal abnormality.  CT CAP 1/20/2023 showed  Ill-defined nodular opacity in the right upper lobe is unchanged when compared to previous exam. No significant change in the sclerotic focus in the T11 vertebral body when compared to previous exam.  feels well today. no headaches, no nausea, no focal weakness. no longer having back pain.   Visit dated 8/2/2023 Patient returns for routine f/u with images for review. Reports doing well with no new neurological deficits to offer. Continues to follow with Dr. Salinas on 2nd line Brigatinib  MRI brain w w/o contrast 7/24/2023 IMPRESSION: Stable vague enhancement, mild increased T2 signal, and hemosiderin deposition posterior superior aspect right insula at site of prior treated metastasis. Stable 3 mm vestibular schwannoma fundus left IAC. No new enhancing lesions identified.  CT C/A/P 5/5/2023 IMPRESSION: Stable exam. No new disease in the chest, abdomen or pelvis. Unchanged spiculated right apical nodule, sub-4 mm left upper lobe nodules and multiple osteosclerotic lesions.  Visit dated 12/7/2023 Patient returns for routine follow up to include progress check and review of completed cranial images. Denies N/V, HA/unilateral extremity weakness/memory changes/gait disturbance/bowel/bladder dysfunction or other neurologic symptoms. No issues with speech or comprehension. Denies seizures  Continues to follow with Dr. Salinas on 2nd line Brigatinib targeting her ALK rearrangement for as long as it benefits the patient, currently at 180mg PO (per heme/onc notes)  MRI brain w w/o contrast 12/1/2023 IMPRESSION: No new areas of abnormal enhancement. Increase in size of the cortical focus of increased T2 FLAIR signal within the posterior aspect of the left frontal lobe at site of a prior treated metastasis. No associated abnormal enhancement. New 4 x 3 mm region of increased T2 signal posterior left frontal lobe near the vertex with associated decreased T1 signal and without associated enhancement. Attention to these areas on follow-up recommended.   CT C/A/P 12/1/2023  final read not available at time of this entry  VISIT dated 5/8/2024 Patient presents for routine f/u and progress check with cranial images for review. Reports doing well overall w/o any new neurological findings. Baseline muscle aches remain persistent but doesn't limit participation in activity attributes to current treatment regimen. Denies N/V, HA/unilateral extremity weakness/memory changes/gait disturbance/bowel/bladder dysfunction or other neurologic symptoms. No issues with speech or comprehension.  Sees neurologist as well.  Continues to follow with Dr. Salinas on 2nd line Brigatinib targeting her ALK rearrangement since late November 2021.  CT C/A/P 4/2/2024 IMPRESSION: Interval disease stability within the chest, abdomen, and pelvis compared to 12/1/2023: Right apical 1.3 cm irregular lesion and right lower lobe pulmonary micronodule unchanged. Sclerotic osseous lesions unchanged. Similar-appearing thickening of the endometrium.  MRI brain w w/o contrast 5/2/2024 IMPRESSION: 1. No acute intracranial abnormality. No findings suspicious for new intracranial metastases. 2. Unchanged left vestibular schwannoma. 3. Mild interval progression of overall very mild white matter disease which may be the sequelae of treatment effect, chronic microvascular ischemic disease, vasculitis, chronic migraines, and/or Lyme disease among other possibilities. 4. Nonspecific progression of an ovoid T1 hypointense focus in the medial left parietal lobe. Attention on follow-up exams is advised.   VISIT DATED: 8/29/2024 Patient returns for progress check and evaluation. Cranial images have been completed as recommended to further assess and r/o POD which was of concern on MRI in May 2024 Continues to follow with Dr. Salinas on 2nd line Brigatinib 180mg daily targeting her ALK rearrangement since late November 2021. Patient denies headache, dizziness, nausea or vomiting, vision changes and has stable gait. She follow up with Dr. Bernard on 8/16/24 who informed her that there is no changes and everything is stable. MRI on 8/15/24, awaiting final report.  CT C/A/P w w/o contrast 8/10/2024 IMPRESSION: Stable exam, with unchanged 1.2 cm right apical nodule, punctate posterior right lower lobe nodule and osteosclerotic lesions. Unchanged indeterminate endometrial thickening. No new disease in the chest, abdomen or pelvis.  MRI brain with SPECT 8/15/2024 final read pending  VISIT DATED: 2/26/2025 Ms. Fields presents for routine f/u and progress check with cranial images for review. States doing well overall but is a little upset today after a misunderstanding with her spouse. Notes mild shoulder discomfort which varies in intensity with temperature changes, but she has not need to utilize medications. Denies N/V, HA/RIGHT side weakness/memory changes/gait disturbance/bowel/bladder dysfunction or other neurologic symptoms. No issues with speech or comprehension.  Follows with neuro/onc @ [place] Continues to follow with Dr. Salinas on 2nd line Brigatinib targeting her ALK rearrangement since late November 2021. Last PETct 11/2024 with Interval increased size of mildly avid RIGHT sclerotic sacral lesion, previously was nonavid. Other are non-FDG avid sclerotic lesions compatible for treated bone metastases are stable. Otherwise, stable findings. Plans for CT C/A/P scheduled for 3/8/2025   MRI brain w w/o contrast 2/15/2025 IMPRESSION: Interval mild increase in 1.5 cm T2 FLAIR hyperintense, non-enhancing lesion in the left medial precentral gyrus compared with 8/15/2024. Additional T2 FLAIR hyperintense, nonenhancing lesions in the right medial frontoparietal white matter and the bilateral precentral gyri are not significantly changed compared to 8/15/2024 No new enhancing lesions identified.

## 2025-02-26 NOTE — HISTORY OF PRESENT ILLNESS
[FreeTextEntry1] : Ms Fields has a h/o metastatic NSCLC with ALK rearrangement who presented with a single brain metastasis measuring 1.4 cm in size in the right inferior frontal gyrus. She completed gamma knife radiation therapy for a total of 2000 cGy to the right parietal area on 3/13/19.  ONCOLOGY HISTORY She presented to me as a 49 year old female with PMH of rheumatoid arthritis, who presented to [place] on 2/20 from OSH with complaints of left sided coolness. She had previously had imaging outside which revealed RUL spiculated mass, sclerotic lesion in T11, as well as 1.4 cm enhancing nodule in insula cortex with vasogenic edema and midline shift. MRI brain from 2/25/19 showed Right inferior frontal gyrus nodule consistent with a primary versus secondary neoplasm, 1.4 X 1.4 X 1.3 cm.  Punctate nodular enhancement along the distal left seventh eighth nerve complex may represent a tiny vestibular schwannoma or leptomeningeal disease. Plan made for SRS.  Pathology from right upper lobe CT guided FNA from 2/26/19 revealed adenocarcinoma, PDL1+ at 5%. She received GK in March of 2019.  She was found to have an ALK mutation and started on Alcensa.    10/2/19- Ms. Fields presents today for follow up. She underwent a brain MRI on 10/1/19 which showed no new disease. Today she is feeling well. She has some bradycardia. Denies headaches, focal weakness, numbness, tingling, nausea, vomiting. Recently back to work. Will have PET scan this coming Friday.  12/4/19- Ms. Fields presents today for follow up. She underwent a brain MRI as an inpatient on 11/27/19 which showed Enhancing lesion in the right frontal operculum just medial to the sylvian fissure is larger when compared to 10/1/2019 but is significantly smaller compared with 3/13/2019 and may represent neoplasm versus post therapy changes. No change tiny enhancing nodule left internal auditory canal. 4.5 mm in AP diameter x 6.2 mm transversely.She notably presented to [facility] on 11/27/19 with 3 episodes of left face, hand, and leg. SHe was discharged to follow up with us. She follows with . Will be seeing Dr. Salinas this week to establish care. Today she denies headaches, focal weakness, nausea, vomiting. She is on Keppra 500 BID. Not on any steroids. Will see Dr. Garcia. She has not had any further feelings of tingling to her left face and left arm. Continues on Alectinib, was dose reduced for a bit of time due to bradycardia.  Denies trouble with balance. Intermittently has a slight sensation, indescribable, to the left side of her face around her mouth, which she has previously experienced when starting Alectinib.   1/15/20- Ms. Fields presents today for follow up. She underwent an MRI with spectroscopy and perfusion this afternoon. This  CT CAP 1/7/20 showed Spiculated appearing mass in the right upper lobe, consistent with patient's  known lung cancer. Scattered sclerotic osseous lesions may represent metastatic disease. These are likely progressed from prior.  Today she is feeling well. Continues on Alectinib. Denies headaches, confusion, nausea, vomiting, No further seizures. Now on briviact for seizure management. Now following with neuro oncologist Dr. Karis Reyes.   MRI interpreted as:  Impression: Enhancement in the right basal ganglia frontal operculum larger compared with 11/27/2019 is suspicious for progression of neoplasm. MR spectroscopy does not demonstrate increased choline to creatine, against tumor recurrence. MR perfusion demonstrates decreased blood pool suggesting post therapy change. Recommend close clinical follow-up.  03/5/2020- Ms. Fields presents today for follow up. She recently was found to have increased LFTs and a new anemia with HGB 6.7. Alectinib was held. MRI brain from 2/27/2020 showed a similar appearing enhancing right subinsular and right frontal opercular lesion. Decreased edema in the larger right subinsular lesion.   PET scan 1/31/2020 showed mildly FDG avid RUL lung mass was decreased in size and metabolism when compared to 10/4/2020. There was resolution of a small mildly FDG avid right paratracheal node. Non FDG avid sclerotic lesions are compatible with treated bone mets. Today she denies headaches, focal weakness, numbness, tingling, nausea vomiting. No seizures. Last dose of steroids was this morning. Notes swelling in her legs has returned recently. Feeling extremely cold and tired.   05/28/20- Ms. Fields is seen today via Telehealth platform in follow up. Verbal consent obtained. PET/CT 05/04/20 noted mildly FDG-avid spiculated right upper lobe lung mass is unchanged on CT, and minimally increased in metabolism as compared to prior study dated 1/31/2020. Etiology is indeterminate. Small focus of residual disease is not excluded. New small hypermetabolic right perihilar and right paratracheal lymph nodes are nonspecific.  Non- FDG-avid sclerotic lesions, unchanged, compatible with treated bone metastases. She saw Dr. Humphreys on 03/20/20 with plans for bone marrow biopsy for her hemolytic anemia. She was discontinued on Alectinib due to anemia and was restarted again by Dr. Silveira. She continues to take  Briviact for seizure.  She saw Dr. Salinas on 05/08/20 with follow up plans in June.MRI brain 5/27/2020 showed Unchanged size of enhancing lesion in the right subinsular region with increased surrounding vasogeni c edema. Continued close interval follow-up is recommended. Today she feels fine. Denies headaches, nausea,  dizziness, vision or auditory changes, no weakness, numbness or tingling. On Alectinib.   8/4/20 She presents for follow up 7/30/20 MRI showed abnormal enhancing lesion with surrounding edema is again identified though decreased size and surrounding edema seen when compared prior exam.  Today she feels mostly well. Denies headaches, nausea, vomiting. Has not had any further tingling or tickling sensations on her face. Recently stopped her antifungals as they caused her SOB and edema, and this has helped. Continues on alectinib. Bone marrow biopsy for her anemia found to be consistent with histoplasmosis.    11/4/20: She presents for f/u with most recent MRI from 10/30/20 showing  decreasing enhancement and surrounding FLAIR signal abnormality along the inferior right frontal lobe consistent with response to treatment. No new lesions are identified.  3/4/2021- Ms. Fields presents today for follow up.  She continues to follow with Dr. Salinas. Continues on alectinib dose reduced after ANDREW treatment in 12/2020. Has not yet started posaconazole for histoplasmosis.   MR brain 2/26/2021 showed Small focus of enhancement in the right frontal lobe without significant interval change from the most recent exam compatible with a treated metastasis. 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  PET scan 2/12/2021 showed  1. Mildly FDG-avid spiculated right upper lobe nodule is unchanged in size and metabolism, compatible with treated disease. 2. Few new small FDG-avid right axillary lymph nodes are nonspecific, probably reactive. Further evaluation may be performed with ultrasound. Nonspecific small FDG-avid mediastinal and bilateral hilar lymph nodes, unchanged in number and avidity. Right paratracheal node demonstrates new calcification. 3. Probable physiologic FDG activity, bilateral adrenal glands, unchanged. 4. Mild FDG activity associated with fractures in right 11th and 12th ribs, decreased as compared to prior study. Few non-FDG-avid sclerotic lesions, unchanged, compatible with treated bone metastases.  Today she feels very well. Had a headache after her cataract surgery, but none since. No seizures, no focal weakness.  7/15/2021- Ms. Fields presents today for follow up. She was admitted 6/29-7/1 with left arm, left face, and left chest discomfort. EEG normal. MRI head 7/1/2021 showed  1.  No new lesions are identified. 2.  Stable appearance of a previously treated metastatic lesion as described. 3.  Stable 3 mm enhancing lesion in the left internal auditory canal which likely represents a vestibular schwannoma.  Alectinib held upon discharge. discharged on briviact 25mg in AM, 50mg in PM.  She started pemtrexed on 7/9.  Notably PET scan on 6/21/2021 showed  1. Mildly FDG avid spiculated right upper lobe nodule is unchanged in size and metabolism, compatible with treated disease. 2. Nonspecific new mild FDG avidity of bilateral level Ib cervical lymph nodes which have minimally increased in size. Please correlate clinically and with ultrasound for further evaluation as indicated. 3. Interval decrease in size and FDG avidity of nonspecific right axillary lymph nodes. Nonspecific small FDG avid mediastinal and bilateral hilar lymph nodes, unchanged in number and avidity. 4. Probable physiologic FDG activity, bilateral adrenal glands, not significantly changed. 5. Interval further decreased FDG activity associated with fractures in the right 11th-12th ribs. A few non-FDG avid sclerotic lesions, unchanged, compatible with treated bone metastasis. 6. Unchanged mild gastric hypermetabolism. Please correlate clinically for gastritis.  Today she is frustrated regarding recent tightness to her left arm, face, chest, and jaw. She notes this has been worse lately since stopping alectinib and continuing on posaconazole with her new chemotherapy. She does not have this symptom when she does not take posaconazle.   10/14/2021 - Patient presents for followup. Interval MRI on 10/05 shows 2 small enhancing lesions which are new since the prior exam of 7/1/2021 suspicious for metastasis. A 5 mm focus is in the right frontal lobe along the motor strip and a 3 mm focus is in the left frontal lobe in the subcortical white matter of the precentral gyrus.  Recent bone marrow biopsy showed histoplasmosis as well as AdenoCa. Recent body imaging PET from 10/05 showing new or progressive disease at RUL, mediastinum and pelvic bones.  She has had difficulty with tolerating her alectinib and has been on varying doses over the previous year most recently having held it all together until this past week.    Restarted Alectinib last Saturday due to POD.   12/09/2021:  Patient reports today for regular follow up.  Her last MRI was prior to her 10/14 appt, which showed the presence of a 5 mm focus in the right frontal lobe along the motor strip and a 3 mm focus in the left frontal lobe in the subcortical white matter of the precentral gyrus. MRI brain 12/5/21 as per our read shows resolution of these lesions.  Saw Dr. Salinas 11/19/21, with Alectinib discontinued d/t concern of hemolytic anemia. Of note, on 12/7/21  recently presented to [facility] ED with hemoptysis, and CTA done negative for PE. Today she reports feeling well. Denies dizziness, nausea, vomiting. Continues to take poscanozole and alunbrig which she feels is elevating her blood pressure and increasing pressure in the head.   2/17/2022: Pt presents for follow-up. Reports occasional dizziness, fatigue, and lightheadedness, but unsure if due to changes in BP medication. Notes the adjustment in medications has helped HA (ongoing for some months). Occasionally has blurred vision when trying to read and write. Continues on Brigatinib, and Posaconazole (histoplasmosis)  5/18/2022- Ms. Fields presents today for follow up.  Brain MRI done 5/13/2022 showed 1. No evidence of recurrent metastatic disease. 2. A punctate enhancing focus and associated susceptibility artifact in the ventral posterior aspect of the right frontal lobe, compatible with sequela of treated metastasis, is stable. 3. 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  Continues on brigatinib.  Notes some pain to crown of head extending from bilateral shoulders over the past few months. shoulders get sore at the end of the day.  8/25/2022- Ms. Fields presents today for follow up. continues to follow with Dr. Salinas. Completed posiconazole in 4/2022. Continues on brigatinib.  CT CAP 7/22/2022 showed New patchy groundglass opacity in the right lower lobe, likely infectious/inflammatory. Enlarging indeterminate mediastinal and hilar lymphadenopathy. Follow-up chest CT in 3 months or as per clinical protocol. Stable spiculated right apical nodule and osteosclerotic metastases. No new disease in the abdomen or pelvis.  [facility] MRI 8/22/2022 showed No evidence of intracranial metastatic disease. Resolution of previously seen precentral gyrus subcentimeter enhancing metastases. A focal area of T2 hyperintensity is seen in the region of the previously seen left-sided enhancing lesion likely representing the sequelae of prior radiation therapy. Continued follow-up is advised. Stable left internal auditory canal vestibular schwannoma. Stable area of enhancement with associated susceptibility artifact within the posterior right frontal lobe compatible with sequela of treated metastasis.  feeling very well overall no headaches, no nausea, no focal weakness, no numbness or tingling.  11/30/2022- Ms. Fields presents today for follow up. MRI brain done 11/23/2022 showed * STABLE VAGUE ENHANCEMENT, MILD INCREASED T2 SIGNAL, AND HEMOSIDERIN DEPOSITION POSTERIOR SUPERIOR ASPECT RIGHT INSULA AT SITE OF PRIOR TREATED METASTASIS. 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  * NO NEW ENHANCING LESIONS IDENTIFIED.  Continues to follow with Dr. Salinas. continues on brigatinib.  CT CAP 10/21/2022 showed Since 7/22/2022.  Stable right upper lobe spiculated right upper lobe nodule.  Stable mildly enlarged hilar lymph nodes (although increased compared to 4/22/2022).  Stable sclerotic bone lesions. No finding to suggest new or progressive disease.  Overall feeling well. no headaches, no nausea, no focal weakness, no numbness or tingling. sometimes gets muscle pains.   3/30/2023- Ms. Fields presents today for follow up. SHe has contineud to follow with Dr. Salinas. continues on second line brigatinib. Brain MRI and total spine MRI done at request of neuro oncologist done 3/23/2023.   MRI brain 3/23/2023 showed  Similar minimal curvilinear enhancement along the posterior insula (35-92). Similar minimal associated T2 FLAIR signal abnormality. Minimally decreased size of previously seen focus of enhancement in the distal left IAC, currently 2.5 mm, previously 3 mm No new abnormal parenchymal or leptomeningeal enhancement.  MRI total spine 3/23/2023 showed No findings of osseous malignancy. No abnormal cord enhancement or cord signal abnormality.  CT CAP 1/20/2023 showed  Ill-defined nodular opacity in the right upper lobe is unchanged when compared to previous exam. No significant change in the sclerotic focus in the T11 vertebral body when compared to previous exam.  feels well today. no headaches, no nausea, no focal weakness. no longer having back pain.   Visit dated 8/2/2023 Patient returns for routine f/u with images for review. Reports doing well with no new neurological deficits to offer. Continues to follow with Dr. Salinas on 2nd line Brigatinib  MRI brain w w/o contrast 7/24/2023 IMPRESSION: Stable vague enhancement, mild increased T2 signal, and hemosiderin deposition posterior superior aspect right insula at site of prior treated metastasis. Stable 3 mm vestibular schwannoma fundus left IAC. No new enhancing lesions identified.  CT C/A/P 5/5/2023 IMPRESSION: Stable exam. No new disease in the chest, abdomen or pelvis. Unchanged spiculated right apical nodule, sub-4 mm left upper lobe nodules and multiple osteosclerotic lesions.  Visit dated 12/7/2023 Patient returns for routine follow up to include progress check and review of completed cranial images. Denies N/V, HA/unilateral extremity weakness/memory changes/gait disturbance/bowel/bladder dysfunction or other neurologic symptoms. No issues with speech or comprehension. Denies seizures  Continues to follow with Dr. Salinas on 2nd line Brigatinib targeting her ALK rearrangement for as long as it benefits the patient, currently at 180mg PO (per heme/onc notes)  MRI brain w w/o contrast 12/1/2023 IMPRESSION: No new areas of abnormal enhancement. Increase in size of the cortical focus of increased T2 FLAIR signal within the posterior aspect of the left frontal lobe at site of a prior treated metastasis. No associated abnormal enhancement. New 4 x 3 mm region of increased T2 signal posterior left frontal lobe near the vertex with associated decreased T1 signal and without associated enhancement. Attention to these areas on follow-up recommended.   CT C/A/P 12/1/2023  final read not available at time of this entry  VISIT dated 5/8/2024 Patient presents for routine f/u and progress check with cranial images for review. Reports doing well overall w/o any new neurological findings. Baseline muscle aches remain persistent but doesn't limit participation in activity attributes to current treatment regimen. Denies N/V, HA/unilateral extremity weakness/memory changes/gait disturbance/bowel/bladder dysfunction or other neurologic symptoms. No issues with speech or comprehension.  Sees neurologist as well.  Continues to follow with Dr. Salinas on 2nd line Brigatinib targeting her ALK rearrangement since late November 2021.  CT C/A/P 4/2/2024 IMPRESSION: Interval disease stability within the chest, abdomen, and pelvis compared to 12/1/2023: Right apical 1.3 cm irregular lesion and right lower lobe pulmonary micronodule unchanged. Sclerotic osseous lesions unchanged. Similar-appearing thickening of the endometrium.  MRI brain w w/o contrast 5/2/2024 IMPRESSION: 1. No acute intracranial abnormality. No findings suspicious for new intracranial metastases. 2. Unchanged left vestibular schwannoma. 3. Mild interval progression of overall very mild white matter disease which may be the sequelae of treatment effect, chronic microvascular ischemic disease, vasculitis, chronic migraines, and/or Lyme disease among other possibilities. 4. Nonspecific progression of an ovoid T1 hypointense focus in the medial left parietal lobe. Attention on follow-up exams is advised.   VISIT DATED: 8/29/2024 Patient returns for progress check and evaluation. Cranial images have been completed as recommended to further assess and r/o POD which was of concern on MRI in May 2024 Continues to follow with Dr. Salinas on 2nd line Brigatinib 180mg daily targeting her ALK rearrangement since late November 2021. Patient denies headache, dizziness, nausea or vomiting, vision changes and has stable gait. She follow up with Dr. Bernard on 8/16/24 who informed her that there is no changes and everything is stable. MRI on 8/15/24, awaiting final report.  CT C/A/P w w/o contrast 8/10/2024 IMPRESSION: Stable exam, with unchanged 1.2 cm right apical nodule, punctate posterior right lower lobe nodule and osteosclerotic lesions. Unchanged indeterminate endometrial thickening. No new disease in the chest, abdomen or pelvis.  MRI brain with SPECT 8/15/2024 final read pending  VISIT DATED: 2/26/2025 Ms. Fields presents for routine f/u and progress check with cranial images for review. States doing well overall but is a little upset today after a misunderstanding with her spouse. Notes mild shoulder discomfort which varies in intensity with temperature changes, but she has not need to utilize medications. Denies N/V, HA/RIGHT side weakness/memory changes/gait disturbance/bowel/bladder dysfunction or other neurologic symptoms. No issues with speech or comprehension.  Follows with neuro/onc @ [place] Continues to follow with Dr. Salinas on 2nd line Brigatinib targeting her ALK rearrangement since late November 2021. Last PETct 11/2024 with Interval increased size of mildly avid RIGHT sclerotic sacral lesion, previously was nonavid. Other are non-FDG avid sclerotic lesions compatible for treated bone metastases are stable. Otherwise, stable findings. Plans for CT C/A/P scheduled for 3/8/2025   MRI brain w w/o contrast 2/15/2025 IMPRESSION: Interval mild increase in 1.5 cm T2 FLAIR hyperintense, non-enhancing lesion in the left medial precentral gyrus compared with 8/15/2024. Additional T2 FLAIR hyperintense, nonenhancing lesions in the right medial frontoparietal white matter and the bilateral precentral gyri are not significantly changed compared to 8/15/2024 No new enhancing lesions identified.

## 2025-03-06 NOTE — PHYSICAL EXAM
[Oriented To Time, Place, And Person] : oriented to person, place, and time [Impaired Insight] : insight and judgment were intact [Affect] : the affect was normal [Person] : oriented to person [Place] : oriented to place [Time] : oriented to time [Short Term Intact] : short term memory intact [Remote Intact] : remote memory intact [Span Intact] : the attention span was normal [Concentration Intact] : normal concentrating ability [Fluency] : fluency intact [Comprehension] : comprehension intact [Current Events] : adequate knowledge of current events [Past History] : adequate knowledge of personal past history [Vocabulary] : adequate range of vocabulary [Cranial Nerves Optic (II)] : visual acuity intact bilaterally,  pupils equal round and reactive to light [Cranial Nerves Oculomotor (III)] : extraocular motion intact [Cranial Nerves Trigeminal (V)] : facial sensation intact symmetrically [Cranial Nerves Facial (VII)] : face symmetrical [Cranial Nerves Vestibulocochlear (VIII)] : hearing was intact bilaterally [Cranial Nerves Glossopharyngeal (IX)] : tongue and palate midline [Cranial Nerves Accessory (XI - Cranial And Spinal)] : head turning and shoulder shrug symmetric [Cranial Nerves Hypoglossal (XII)] : there was no tongue deviation with protrusion [Motor Tone] : muscle tone was normal in all four extremities [Motor Strength] : muscle strength was normal in all four extremities [No Muscle Atrophy] : normal bulk in all four extremities [Sensation Tactile Decrease] : light touch was intact [Abnormal Walk] : normal gait [Balance] : balance was intact [Sclera] : the sclera and conjunctiva were normal [PERRL With Normal Accommodation] : pupils were equal in size, round, reactive to light, with normal accommodation [Outer Ear] : the ears and nose were normal in appearance [Neck Appearance] : the appearance of the neck was normal [] : no respiratory distress

## 2025-03-07 NOTE — HISTORY OF PRESENT ILLNESS
[de-identified] : Ms. Fields is a very pleasant 55 year old female with PMH Daiabetes, rheumatoid arthritis, metastatic NSCLC s/p Gamma Knife to single brain metastasis measuring 1.4 cm in size in the right inferior frontal gyrus on 3/13/19 with Dr. Elkins, who was referred by Dr. Elkins to discuss potential biopsy versus resection of new metastatic lesion in left medial precentral gyrus. She is clinically asymptomatic.  MRI brain w w/o contrast 2/15/2025 IMPRESSION: Interval mild increase in 1.5 cm T2 FLAIR hyperintense, non-enhancing lesion in the left medial precentral gyrus compared with 8/15/2024. Additional T2 FLAIR hyperintense, nonenhancing lesions in the right medial frontoparietal white matter and the bilateral precentral gyri are not significantly changed compared to 8/15/2024 No new enhancing lesions identified.  3/6/2025:  Denies any new symptoms.  Denies speech difficulties.  Denies pain/weakness to right side.  Still has occasional pain to left upper extremity that began after chemotherapy medication.

## 2025-03-07 NOTE — HISTORY OF PRESENT ILLNESS
[de-identified] : Ms. Fields is a very pleasant 55 year old female with PMH Daiabetes, rheumatoid arthritis, metastatic NSCLC s/p Gamma Knife to single brain metastasis measuring 1.4 cm in size in the right inferior frontal gyrus on 3/13/19 with Dr. Elkins, who was referred by Dr. Elkins to discuss potential biopsy versus resection of new metastatic lesion in left medial precentral gyrus. She is clinically asymptomatic.  MRI brain w w/o contrast 2/15/2025 IMPRESSION: Interval mild increase in 1.5 cm T2 FLAIR hyperintense, non-enhancing lesion in the left medial precentral gyrus compared with 8/15/2024. Additional T2 FLAIR hyperintense, nonenhancing lesions in the right medial frontoparietal white matter and the bilateral precentral gyri are not significantly changed compared to 8/15/2024 No new enhancing lesions identified.  3/6/2025:  Denies any new symptoms.  Denies speech difficulties.  Denies pain/weakness to right side.  Still has occasional pain to left upper extremity that began after chemotherapy medication.

## 2025-03-07 NOTE — HISTORY OF PRESENT ILLNESS
[de-identified] : Ms. Fields is a very pleasant 55 year old female with PMH Daiabetes, rheumatoid arthritis, metastatic NSCLC s/p Gamma Knife to single brain metastasis measuring 1.4 cm in size in the right inferior frontal gyrus on 3/13/19 with Dr. Elkins, who was referred by Dr. Elkins to discuss potential biopsy versus resection of new metastatic lesion in left medial precentral gyrus. She is clinically asymptomatic.  MRI brain w w/o contrast 2/15/2025 IMPRESSION: Interval mild increase in 1.5 cm T2 FLAIR hyperintense, non-enhancing lesion in the left medial precentral gyrus compared with 8/15/2024. Additional T2 FLAIR hyperintense, nonenhancing lesions in the right medial frontoparietal white matter and the bilateral precentral gyri are not significantly changed compared to 8/15/2024 No new enhancing lesions identified.  3/6/2025:  Denies any new symptoms.  Denies speech difficulties.  Denies pain/weakness to right side.  Still has occasional pain to left upper extremity that began after chemotherapy medication.

## 2025-03-07 NOTE — HISTORY OF PRESENT ILLNESS
[de-identified] : Ms. Fields is a very pleasant 55 year old female with PMH Daiabetes, rheumatoid arthritis, metastatic NSCLC s/p Gamma Knife to single brain metastasis measuring 1.4 cm in size in the right inferior frontal gyrus on 3/13/19 with Dr. Elkins, who was referred by Dr. Elkins to discuss potential biopsy versus resection of new metastatic lesion in left medial precentral gyrus. She is clinically asymptomatic.  MRI brain w w/o contrast 2/15/2025 IMPRESSION: Interval mild increase in 1.5 cm T2 FLAIR hyperintense, non-enhancing lesion in the left medial precentral gyrus compared with 8/15/2024. Additional T2 FLAIR hyperintense, nonenhancing lesions in the right medial frontoparietal white matter and the bilateral precentral gyri are not significantly changed compared to 8/15/2024 No new enhancing lesions identified.  3/6/2025:  Denies any new symptoms.  Denies speech difficulties.  Denies pain/weakness to right side.  Still has occasional pain to left upper extremity that began after chemotherapy medication.

## 2025-03-07 NOTE — ASSESSMENT
[FreeTextEntry1] : 55 year old female PMH Diabetes, rheumatoid arthritis, metastatic NSCLC s/p Gamma Knife to single brain metastasis measuring 1.4 cm in size in the right inferior frontal gyrus on 3/13/19 with Dr. Elkins, who was referred by Dr. Elkins to discuss potential biopsy versus resection of new metastatic lesion in left medial precentral gyrus. She is clinically asymptomatic.  MRI brain w w/o contrast 2/15/2025 Interval mild increase in 1.5 cm T2 FLAIR hyperintense, non-enhancing lesion in the left medial precentral gyrus compared with 8/15/2024. Additional T2 FLAIR hyperintense, nonenhancing lesions in the right medial frontoparietal white matter and the bilateral precentral gyri are not significantly changed compared to 8/15/2024  - Referral given for pain management for Left upper extremity pain. - MRI brain w/ and w/o contrast Quicktome protocol, MR spectroscopy and MR perfusion - Follow up after imaging is complete.

## 2025-03-12 NOTE — BEGINNING OF VISIT
[0] : 2) Feeling down, depressed, or hopeless: Not at all (0) [PHQ-9 Deferred] : PHQ-9 Deferred [[id number]] : 0 [Pain Scale: ___] : On a scale of 1-10, today the patient's pain is a(n) [unfilled]. [Future Reassessment of Pain Scale] : Future reassessment of pain scale [Never] : Never [Reviewed, no changes] : Reviewed, no changes

## 2025-03-12 NOTE — BEGINNING OF VISIT
[0] : 2) Feeling down, depressed, or hopeless: Not at all (0) [PHQ-9 Deferred] : PHQ-9 Deferred [[id number]] : 0 [Pain Scale: ___] : On a scale of 1-10, today the patient's pain is a(n) [unfilled]. [Future Reassessment of Pain Scale] : Future reassessment of pain scale [Never] : Never [Reviewed, no changes] : Reviewed, no changes

## 2025-03-12 NOTE — BEGINNING OF VISIT
[0] : 2) Feeling down, depressed, or hopeless: Not at all (0) [PHQ-9 Deferred] : PHQ-9 Deferred [[id number]] : 0 [Pain Scale: ___] : On a scale of 1-10, today the patient's pain is a(n) [unfilled]. [Future Reassessment of Pain Scale] : Future reassessment of pain scale [Never] : Never [Reviewed, no changes] : Reviewed, no changes

## 2025-03-12 NOTE — BEGINNING OF VISIT
[0] : 2) Feeling down, depressed, or hopeless: Not at all (0) [PHQ-9 Deferred] : PHQ-9 Deferred [[id number]] : 0 [Pain Scale: ___] : On a scale of 1-10, today the patient's pain is a(n) [unfilled]. [Future Reassessment of Pain Scale] : Future reassessment of pain scale [Never] : Never [Reviewed, no changes] : Reviewed, no changes

## 2025-03-13 NOTE — RESULTS/DATA
[FreeTextEntry1] : -PET/CT 10/4/19:  Right upper lobe mass, mediastinal and hilar lymph nodes appear significantly smaller and less intense than prior PET/CT from March 2019 and essentially unchanged from CT from June 2019.  There is a 5 mm right middle lobe nodule appearing more prominent than the prior study.  -CT Head 11/26/19:  No acute intracranial hemorrhage.  Vasogenic edema in the right frontoparietal region in area of known metastasis as seen on MR 10/1/2019.   -MRI Brain 11/27/19:  Enhancing lesion in the right frontal operculum just medial to the sylvian fissure is larger when compared to 10/1/2019 but is significantly smaller compared with 3/13/2019 and may represent neoplasm versus post therapy changes. No change tiny enhancing nodule left internal auditory canal.   -CT C/A/P 1/7/20:  Spiculated appearing mass in the right upper lobe, consistent with patient's known lung cancer. Comparison is made to a prior CT scan of the abdomen and pelvis which was performed on 2/18/2019. The sclerotic lesions in the left iliac bone were not seen on the prior exam. The sclerotic lesion in the right sacrum is also new. The lesion in the right iliac bone was seen previously.  The sclerotic lesion in the T11 vertebral body has significantly increased in size compared to the prior exam. These findings are concerning for progression of osseous metastasis.   -PET/CT 1/31/20:  Abnormal FDG-PET/CT scan.  1. Mildly FDG-avid spiculated right upper lobe lung mass is decreased in size and metabolism as compared to PET/CT dated 10/4/2019, and is not significantly changed as compared to CT dated 1/7/2020.  2. Resolution of small, mildly FDG-avid right paratracheal lymph node.  3. Non-FDG-avid sclerotic lesions, unchanged as compared to prior PET/CT, and interval diagnostic CT, are compatible with treated bone metastases.   -MRI Brain 2/27/20:  Similar-appearing enhancing right subinsular and right frontal opercular  lesions as detailed in the body the report. Decreased edema surrounding the larger right subinsular lesion. No abnormal leptomeningeal enhancement   -PET/CT 5/4/20:  Abnormal FDG-PET/CT scan.  1. Mildly FDG-avid spiculated right upper lobe lung mass is unchanged on CT, and minimally increased in metabolism as compared to prior study dated 1/31/2020. Etiology is indeterminate. Small focus of residual disease is not excluded.  2. New small hypermetabolic right perihilar and right paratracheal lymph nodes are nonspecific.  3. Non-FDG-avid sclerotic lesions, unchanged, compatible with treated bone metastases.   -MRI Brain 5/27/20:  Unchanged size of enhancing lesion in the right subinsular region with increased surrounding vasogenic edema. Continued close interval follow-up is recommended.   -MRI Brain 7/30/20:  Abnormal enhancing lesion with surrounding edema is again identified though decreased size and surrounding edema seen when compared prior exam. This finding could be compatible with response to therapy though continued close interval follow-up until resolution is recommended.   -PET/CT 8/10/20:  Compared to FDG-PET/CT scan dated 5/4/2020:  1. Mildly FDG-avid spiculated right upper lobe lung mass is unchanged in size and metabolism.  2. Nonspecific small FDG-avid mediastinal and bilateral hilar lymph nodes are unchanged or decreased in metabolism.  3. New approximately symmetric FDG activity in bilateral adrenal glands, without new corresponding abnormalities on CT, may be physiologic.  4. Non-FDG-avid sclerotic lesions, unchanged, compatible with treated bone metastases.   -CT Head 10/26/20:  No acute intracranial hemorrhage, mass effect, or evidence of acute vascular territorial infarction.  Previously described right frontal subinsular lesion is not well visualized on this examination.  More sensitive evaluation for intracranial metastatic disease, with contrast-enhanced brain MRI may be obtained, as clinically warranted, if no contraindications exist.  -MRI Brain 10/30/20:  Decreasing enhancement and surrounding FLAIR signal abnormality along the inferior right frontal lobe consistent with response to treatment. No new lesions are identified.  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    -PET/CT 11/13/20:  Compared to FDG-PET/CT scan dated 8/10/2020: 1. Mildly FDG-avid spiculated right upper lobe nodule is unchanged in size and metabolism. 2. Nonspecific small FDG-avid mediastinal and bilateral hilar lymph nodes, unchanged. 3. Mild, symmetric FDG activity in bilateral adrenal glands, without corresponding abnormalities on CT, unchanged likely physiologic. 4. New FDG-avid fractures, posterior aspect right 11th and 12th ribs. Correlate clinically for history of trauma. Few non-FDG-avid sclerotic lesions, unchanged, compatible with treated bone metastases.  -Abdominal U/S 12/4/20:  Etiology of patient's elevated LFTs is not elucidated on this study.  No hydronephrosis.  -PET/CT 2/12/21:  Compared to FDG-PET/CT scan dated 11/13/2020: 1. Mildly FDG-avid spiculated right upper lobe nodule is unchanged in size and metabolism, compatible with treated disease. 2. Few new small FDG-avid right axillary lymph nodes are nonspecific, probably reactive. Further evaluation may be performed with ultrasound. Nonspecific small FDG-avid mediastinal and bilateral hilar lymph nodes, unchanged in number and avidity. Right paratracheal node demonstrates new calcification. 3. Probable physiologic FDG activity, bilateral adrenal glands, unchanged. 4. Mild FDG activity associated with fractures in right 11th and 12th ribs, decreased as compared to prior study. Few non-FDG-avid sclerotic lesions, unchanged, compatible with treated bone metastases.  -MRI Brain 2/25/21:  Small focus of enhancement in the right frontal lobe without significant interval change from the most recent exam compatible with a treated metastasis.  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  -Pet/CT 6/21/21:  Compared to FDG-PET/CT scan dated 2/12/2021: 1. Mildly FDG avid spiculated right upper lobe nodule is unchanged in size and metabolism, compatible with treated disease. 2. Nonspecific new mild FDG avidity of bilateral level Ib cervical lymph nodes which have minimally increased in size. Please correlate clinically and with ultrasound for further evaluation as indicated. 3. Interval decrease in size and FDG avidity of nonspecific right axillary lymph nodes. Nonspecific small FDG avid mediastinal and bilateral hilar lymph nodes, unchanged in number and avidity. 4. Probable physiologic FDG activity, bilateral adrenal glands, not significantly changed. 5. Interval further decreased FDG activity associated with fractures in the right 11th-12th ribs. A few non-FDG avid sclerotic lesions, unchanged, compatible with treated bone metastasis. 6. Unchanged mild gastric hypermetabolism. Please correlate clinically for gastritis.  -EKG 6/25/21:  NSR with occasional PVC's   -MRI Brain 7/1/21: No new lesions are identified. Stable appearance of a previously treated metastatic lesion as described. Stable 3 mm enhancing lesion in the left internal auditory canal which likely represents a vestibular schwannoma.  -MRI C-spine 8/2/2021: Degenerative changes of the cervical spine with moderate central canal stenosis at C5-6.  No definite cord signal abnormality.  No abnormal enhancement or mass lesion seen.  -PET/CT 10/5/21:  Compared to FDG-PET/CT scan 6/21/2021: Findings suggestive of progression of disease. 1. Increased FDG avidity and unchanged size of spiculated right upper lobe pulmonary nodule. 2. New or increased FDG avid mediastinal and right hilar lymph nodes. 3. New FDG avidity associated with several unchanged small lytic and small sclerotic lesions in the pelvic bones. 4. Unchanged mild symmetrical FDG activity in bilateral adrenal glands, probably physiologic. 5. Unchanged mild gastric hypermetabolism, probably inflammatory. Please correlate clinically. 6. Unchanged nonspecific, mild fairly symmetric prominent FDG activity in the tonsillar regions, possibly inflammatory. Please correlate with direct visualization. Unchanged mildly FDG avid bilateral level Ib cervical lymph nodes, possibly reactive.  -MRI Brain 10/5/21:  There are 2 small enhancing lesions which are new since the prior exam of 7/1/2021 suspicious for metastasis. A 5 mm focus is in the right frontal lobe along the motor strip and a 3 mm focus is in the left frontal lobe in the subcortical white matter of the precentral gyrus.  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  -US Pelvis 11/3/21:  Nodular echogenic focus within the endometrium suspicious for polyp.  Unremarkable appearance of the ovaries  -[facility] 11/19/21:  No alterations identified.    -MRI Brain 12/5/21:  Abnormal enhancing lesions are again seen and slightly small in size which could be due to response to therapy. Continued close interval follow-up is recommended.  -CT Chest Angio 12/7/21 (COMPARED TO CT 1/7/20 AND NOT INTERVAL PET/CT'S):  No pulmonary embolus.  Right upper lobe mass similar to 01/07/2020.  Question of new metastasis to the right hilum.  Apparent skeletal metastases unchanged  -CT C/A/P 1/26/22:  Decreased size of a neoplasm in the right upper lobe.  Stable bone metastases. No finding to suggest new or progressive disease.  -Nuc Med Bone Scan 1/26/22:  Bone scan demonstrates:  No scan abnormality corresponding to sclerotic foci in the spine and pelvis.  FDG avid foci in the pelvis seen on prior PET/CT are not identified.  Degenerative disease in the major joints.  -MRI Brain 2/15/22:  Resolution of previously seen tiny metastatic foci within the precentral gyri.  Small enhancing focus within the right frontal operculum compatible with the sequelae of a treated metastasis, unchanged from the prior exams.  Stable left intracanalicular vestibular schwannoma.  -CT C/A/P 4/22/22:  Stable examination when compared to 1/26/2022, in particular stable spiculated mass in the right upper lobe at 1.5 cm.  Non specific 6 mm right lower paratracheal lymph node, attention on follow-up recommended.  Stable bony lesions concerning for metastatic disease.  No new metastatic disease.  -MRI Brain 5/14/22:   1. No evidence of recurrent metastatic disease. 2. A punctate enhancing focus and associated susceptibility artifact in the ventral posterior aspect of the right frontal lobe, compatible with sequela of treated metastasis, is stable. 3. 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  -CT C/A/P 7/22/22:  New patchy groundglass opacity in the right lower lobe, likely infectious/inflammatory. Enlarging indeterminate mediastinal and hilar lymphadenopathy. Follow-up chest CT in 3 months or as per clinical protocol.  Stable spiculated right apical nodule and osteosclerotic metastases.  No new disease in the abdomen or pelvis.  -[facility] 7/29/22:  No alterations identified.    -MRI Brain 8/19/22:  No evidence of intracranial metastatic disease. Resolution of previously seen precentral gyrus subcentimeter enhancing metastases. A focal area of T2 hyperintensity is seen in the region of the previously seen left-sided enhancing lesion likely representing the sequelae of prior radiation therapy. Continued follow-up is advised.  Stable left internal auditory canal vestibular schwannoma.  Stable area of enhancement with associated susceptibility artifact within the posterior right frontal lobe compatible with sequela of treated metastasis.  -CT C/A/P 10/21/22:  Since 7/22/2022:  Stable right upper lobe spiculated right upper lobe nodule.  Stable mildly enlarged hilar lymph nodes (although increased compared to 4/22/2022).  Stable sclerotic bone lesions.  No finding to suggest new or progressive disease.  -MRI Brain 11/23/22:   * STABLE VAGUE ENHANCEMENT, MILD INCREASED T2 SIGNAL, AND HEMOSIDERIN DEPOSITION POSTERIOR SUPERIOR ASPECT RIGHT INSULA AT SITE OF PRIOR TREATED METASTASIS. 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 * NO NEW ENHANCING LESIONS IDENTIFIED.  -CT C/A/P 1/20/23:  Ill-defined nodular opacity in the right upper lobe is unchanged when compared to previous exam.  No significant change in the sclerotic focus in the T11 vertebral body when compared to previous exam.  -MRI C/T-Spine 3/23/23:  No findings of osseous malignancy. No abnormal cord enhancement or cord signal abnormality.  -MRI L-Spine 3/23/23:  No findings of osseous or leptomeningeal malignancy.  -MRI Brain 3/23/23:   Similar minimal curvilinear enhancement along the posterior insula ([id number]). Similar minimal associated T2 FLAIR signal abnormality.  Minimally decreased size of previously seen focus of enhancement in the distal left IAC, currently 2.5 mm, previously 3 mm.  No new abnormal parenchymal or leptomeningeal enhancement.  -CT C/A/P 5/5/23:  Stable exam. No new disease in the chest, abdomen or pelvis.  Unchanged spiculated right apical nodule, sub-4 mm left upper lobe nodules and multiple osteosclerotic lesions.  -MRI Brain 7/24/23:  Stable vague enhancement, mild increased T2 signal, and hemosiderin deposition posterior superior aspect right insula at site of prior treated metastasis. Stable 3 mm vestibular schwannoma fundus left IAC. No new enhancing lesions identified.  -CT C/A/P 8/18/23:  Stable exam. Unchanged 1.1 cm right apical nodule, few scattered bilateral sub-4 mm pulmonary nodules, and osteosclerotic lesions.  -MRI Brain 12/1/23:  No new areas of abnormal enhancement. Increase in size of the cortical focus of increased T2 FLAIR signal within the posterior aspect of the left frontal lobe at site of a prior treated metastasis. No associated abnormal enhancement. New 4 x 3 mm region of increased T2 signal posterior left frontal lobe near the vertex with associated decreased T1 signal and without associated enhancement. Attention to these areas on follow-up recommended.  -CT C/A/P 12/1/23:  Stable 1.2 cm right apical spiculated nodular density with stable additional less than 0.4 cm nodules. No new or enlarging pulmonary nodules or consolidations. Stable appearance of sclerotic lesions throughout the visualized osseous structures. No new evidence of metastatic disease. Abnormal appearing endometrium in a patient postmenopausal, recommend pelvic ultrasound.  -TV U/S 1/3/24:  Thickened, cystic endometrium measuring up to 1.0 cm. Gynecologic consultation for tissue sampling is recommended.  -Screening Mammo/Sono 1/25/24:  No mammographic or sonographic evidence of malignancy.  -CT C/A/P 4/2/24:  Interval disease stability within the chest, abdomen, and pelvis compared to 12/1/2023:  Right apical 1.3 cm irregular lesion and right lower lobe pulmonary micronodule unchanged. Sclerotic osseous lesions unchanged. Similar-appearing thickening of the endometrium.  -MRI Brain 5/2/24:   1. No acute intracranial abnormality. No findings suspicious for new intracranial metastases. 2. Unchanged left vestibular schwannoma. 3. Mild interval progression of overall very mild white matter disease which may be the sequelae of treatment effect, chronic microvascular ischemic disease, vasculitis, chronic migraines, and/or Lyme disease among other possibilities. 4. Nonspecific progression of an ovoid T1 hypointense focus in the medial left parietal lobe. Attention on follow-up exams is advised.  -CT CAP 8/10/24:  Stable exam, with unchanged 1.2 cm right apical nodule, punctate posterior right lower lobe nodule and osteosclerotic lesions. Unchanged indeterminate endometrial thickening. No new disease in the chest, abdomen or pelvis.  -MRI head w/wo con 8/15/24:  Minimal increase in size of multiple FLAIR hyperintense, nonenhancing lesions in the bilateral precentral gyri and right medial frontoparietal white matter. Spectroscopy evaluation of the left medial precentral gyrus lesion is nonspecific. Lesions that are amenable to perfusion evaluation demonstrate no hyperperfusion. Evolving posttreatment changes are considered but continued follow-up is recommended.  -PET/CT 11/22/24:   1. Since 6/21/2021 FDG PET/CT, no significant interval change of the spiculated 1.8 cm right upper lobe lung nodule demonstrating mild FDG activity. No new hypermetabolic foci in the thorax. 2. Interval increased size of mildly avid RIGHT sclerotic sacral lesion, previously was nonavid. Other are non-FDG avid sclerotic lesions compatible for treated bone metastases are stable. 3. Persistent mild nonspecific activity within bilateral 1B cervical lymph nodes, may be inflammatory. 4. Stable nonspecific mildly avid mediastinal and bilateral hilar lymph nodes. Resolution of previously noted right hilar lymph noted activity. 5. Stable likely physiologic activity of bilateral adrenal glands. 6. Previously noted mild gastric hypermetabolism not visualized.  Images reviewed/interpreted:  - MR brain w/wo contrast 2/15/25: Interval mild increase in 1.5 cm T2 FLAIR hyperintense, nonenhancing lesion in the left medial precentral gyrus compared with 8/15/2024. Additional T2 FLAIR hyperintense, nonenhancing lesions in the right medial frontoparietal white matter and the bilateral precentral gyri are not significantly changed compared to 8/15/2024. No new enhancing lesions identified.  - CT C/A/P with contrast 3/8/25: Enlarging right sacral osteosclerotic lesion (corresponding to the FDG avidity noted on recent PET/CT). Other osteosclerotic metastases appear grossly unchanged. Stable 1.6 cm right apical nodule and areas of mild interlobular septal thickening in the mid to lower lungs. Unchanged thickening of the adrenal glands. Unchanged endometrial thickening.

## 2025-03-13 NOTE — ASSESSMENT
[Medication(s)] : Medication(s) [FreeTextEntry1] : Never-smoking woman with metastatic NSCLC with adenocarcinoma histology with ALK rearrangement. She was treated with gamma knife RT to brain metastasis in March 2019. She started systemic therapy with Alectinib in April 2019 and achieved IL. Alectinib dose-reduced in late December 2020 due to renal function decline possibly due to anti-fungal agents for disseminated histoplasmosis. Started treatment with oral Posaconazole for histoplasmosis in mid-March 2021. Alectinib has been poorly tolerated in combination with Posaconazole, requiring dose-reductions of Alectinib. She was intolerant of the combination. Patient with sustained response on restaging PET/CT in June 2021. Alectinib was discontinued in late June 2021 due to poor tolerability in combination with posaconazole for disseminated histoplasmosis. Treated with 1 cycle of Pemetrexed chemotherapy on 7/9/21 with very poor tolerability. Further systemic therapy for lung cancer was held from July-October 2021. Restaging PET/CT October 2021 with disease progression. Restarted Alectinib 450mg BID on 10/9/21. She increased Alectinib to full dosing in late October 2021. Developed return of hemolysis in November and Alectinib permanently discontinued on 11/15/21 due to impression of drug-induced hemolytic anemia associated with this agent. Hemolysis resolved with discontinuation of offending agent (Alectinib). [facility] ct-DNA testing 11/19/21 with no evidence of ALK resistance mutations. Started 2nd line Brigatinib in late November 2021; achieved IL. Restaging brain MRI 2/15/25 with mild increase in left medial precentral gyrus lesion - further imaging planned with Dr. Elkins and Dr. Alicia. Restaging CT C/A/P 3/8/25 showed increased size of osteosclerotic sacral lesion corresponding to area of increased FDG avidity on Nov 2024 scan.  Recommend: -Continue treatment with 2nd line Brigatinib targeting her ALK rearrangement for as long as it benefits the patient, currently at 180mg PO daily. -Repeat labs today. Can monitor CEA; not clear if informative. Monitor CPK, amylase/lipase while on brigatinib.  No clear role/benefit for monitoring other tumor markers.   -Discuss RT to oligoprogression to sacrum with Dr. Hart or Dr. Elkins.  Will sort out.  -Anemia:  monitor hemoglobin and periodic iron studies  -Can change omeprazole to pantoprazole per pt request, prescription sent -Can discontinue folic acid; patient previously on this when having hemolysis  -Dysuria. Can treat with Bactrim DS x 3 days. UA/UC sent.  -Questionable Histoplasmosis in BM s/p 1 year of antifungal therapy completed April 2022. Being monitored off antifungal; BM results possibly artifactual. Previously followed with ID. -F/U with Neuro-Oncologist. Titrated off Briviact. -F/U with Rad-Onc Dr. Elkins for management of Brain metastases. Recently saw Dr. Alicia due to Feb 2025 MRI findings. Planned for additional MRI testing including MRI brain w/wo contrast Quicktome protocol, and MR spectroscopy and MR perfusion, which pt states is limited to [facility] location and is awaiting rescheduling.  -Given the previous deep response in bone metastases, avoided the addition of a bone modifying agent - Information provided for PMR for myalgias and hx left shoulder bursitis.  - Follow-up in early May - Check out form given to patient with instructions for making appointments

## 2025-03-13 NOTE — PHYSICAL EXAM
----- Message from Anne [place] sent at 8/24/2023  1:52 PM CDT -----  Regarding: self [phone]    Type: Patient Call Back       Who called: patient       What is the request in detail: Patient had her ultrasound done on 8/24/23. Patient calling in regards to her results. Please call patient as soon as possible.        Can the clinic reply by [facility]? yes       Would the patient rather a call back or a response via [facility]? Call back       Best call back number: [phone]         Additional Information:          [Fully active, able to carry on all pre-disease performance without restriction] : Status 0 - Fully active, able to carry on all pre-disease performance without restriction [Normal] : affect appropriate [de-identified] : No icterus  [de-identified] : No LAD [de-identified] : Clear [de-identified] : S1 S2 [de-identified] : No edema [de-identified] : No spine/CVA tenderness [de-identified] : Ambulatory

## 2025-03-13 NOTE — PHYSICAL EXAM
[Fully active, able to carry on all pre-disease performance without restriction] : Status 0 - Fully active, able to carry on all pre-disease performance without restriction [Normal] : affect appropriate [de-identified] : No icterus  [de-identified] : No LAD [de-identified] : Clear [de-identified] : S1 S2 [de-identified] : No edema [de-identified] : No spine/CVA tenderness [de-identified] : Ambulatory

## 2025-03-13 NOTE — ASSESSMENT
[Medication(s)] : Medication(s) [FreeTextEntry1] : Never-smoking woman with metastatic NSCLC with adenocarcinoma histology with ALK rearrangement. She was treated with gamma knife RT to brain metastasis in March 2019. She started systemic therapy with Alectinib in April 2019 and achieved WA. Alectinib dose-reduced in late December 2020 due to renal function decline possibly due to anti-fungal agents for disseminated histoplasmosis. Started treatment with oral Posaconazole for histoplasmosis in mid-March 2021. Alectinib has been poorly tolerated in combination with Posaconazole, requiring dose-reductions of Alectinib. She was intolerant of the combination. Patient with sustained response on restaging PET/CT in June 2021. Alectinib was discontinued in late June 2021 due to poor tolerability in combination with posaconazole for disseminated histoplasmosis. Treated with 1 cycle of Pemetrexed chemotherapy on 7/9/21 with very poor tolerability. Further systemic therapy for lung cancer was held from July-October 2021. Restaging PET/CT October 2021 with disease progression. Restarted Alectinib 450mg BID on 10/9/21. She increased Alectinib to full dosing in late October 2021. Developed return of hemolysis in November and Alectinib permanently discontinued on 11/15/21 due to impression of drug-induced hemolytic anemia associated with this agent. Hemolysis resolved with discontinuation of offending agent (Alectinib). [facility] ct-DNA testing 11/19/21 with no evidence of ALK resistance mutations. Started 2nd line Brigatinib in late November 2021; achieved WA. Restaging brain MRI 2/15/25 with mild increase in left medial precentral gyrus lesion - further imaging planned with Dr. Elkins and Dr. Alicia. Restaging CT C/A/P 3/8/25 showed increased size of osteosclerotic sacral lesion corresponding to area of increased FDG avidity on Nov 2024 scan.  Recommend: -Continue treatment with 2nd line Brigatinib targeting her ALK rearrangement for as long as it benefits the patient, currently at 180mg PO daily. -Repeat labs today. Can monitor CEA; not clear if informative. Monitor CPK, amylase/lipase while on brigatinib.  No clear role/benefit for monitoring other tumor markers.   -Discuss RT to oligoprogression to sacrum with Dr. Hart or Dr. Elkins.  Will sort out.  -Anemia:  monitor hemoglobin and periodic iron studies  -Can change omeprazole to pantoprazole per pt request, prescription sent -Can discontinue folic acid; patient previously on this when having hemolysis  -Dysuria. Can treat with Bactrim DS x 3 days. UA/UC sent.  -Questionable Histoplasmosis in BM s/p 1 year of antifungal therapy completed April 2022. Being monitored off antifungal; BM results possibly artifactual. Previously followed with ID. -F/U with Neuro-Oncologist. Titrated off Briviact. -F/U with Rad-Onc Dr. Elkins for management of Brain metastases. Recently saw Dr. Alicia due to Feb 2025 MRI findings. Planned for additional MRI testing including MRI brain w/wo contrast Quicktome protocol, and MR spectroscopy and MR perfusion, which pt states is limited to [facility] location and is awaiting rescheduling.  -Given the previous deep response in bone metastases, avoided the addition of a bone modifying agent - Information provided for PMR for myalgias and hx left shoulder bursitis.  - Follow-up in early May - Check out form given to patient with instructions for making appointments

## 2025-03-13 NOTE — RESULTS/DATA
[FreeTextEntry1] : -PET/CT 10/4/19:  Right upper lobe mass, mediastinal and hilar lymph nodes appear significantly smaller and less intense than prior PET/CT from March 2019 and essentially unchanged from CT from June 2019.  There is a 5 mm right middle lobe nodule appearing more prominent than the prior study.  -CT Head 11/26/19:  No acute intracranial hemorrhage.  Vasogenic edema in the right frontoparietal region in area of known metastasis as seen on MR 10/1/2019.   -MRI Brain 11/27/19:  Enhancing lesion in the right frontal operculum just medial to the sylvian fissure is larger when compared to 10/1/2019 but is significantly smaller compared with 3/13/2019 and may represent neoplasm versus post therapy changes. No change tiny enhancing nodule left internal auditory canal.   -CT C/A/P 1/7/20:  Spiculated appearing mass in the right upper lobe, consistent with patient's known lung cancer. Comparison is made to a prior CT scan of the abdomen and pelvis which was performed on 2/18/2019. The sclerotic lesions in the left iliac bone were not seen on the prior exam. The sclerotic lesion in the right sacrum is also new. The lesion in the right iliac bone was seen previously.  The sclerotic lesion in the T11 vertebral body has significantly increased in size compared to the prior exam. These findings are concerning for progression of osseous metastasis.   -PET/CT 1/31/20:  Abnormal FDG-PET/CT scan.  1. Mildly FDG-avid spiculated right upper lobe lung mass is decreased in size and metabolism as compared to PET/CT dated 10/4/2019, and is not significantly changed as compared to CT dated 1/7/2020.  2. Resolution of small, mildly FDG-avid right paratracheal lymph node.  3. Non-FDG-avid sclerotic lesions, unchanged as compared to prior PET/CT, and interval diagnostic CT, are compatible with treated bone metastases.   -MRI Brain 2/27/20:  Similar-appearing enhancing right subinsular and right frontal opercular  lesions as detailed in the body the report. Decreased edema surrounding the larger right subinsular lesion. No abnormal leptomeningeal enhancement   -PET/CT 5/4/20:  Abnormal FDG-PET/CT scan.  1. Mildly FDG-avid spiculated right upper lobe lung mass is unchanged on CT, and minimally increased in metabolism as compared to prior study dated 1/31/2020. Etiology is indeterminate. Small focus of residual disease is not excluded.  2. New small hypermetabolic right perihilar and right paratracheal lymph nodes are nonspecific.  3. Non-FDG-avid sclerotic lesions, unchanged, compatible with treated bone metastases.   -MRI Brain 5/27/20:  Unchanged size of enhancing lesion in the right subinsular region with increased surrounding vasogenic edema. Continued close interval follow-up is recommended.   -MRI Brain 7/30/20:  Abnormal enhancing lesion with surrounding edema is again identified though decreased size and surrounding edema seen when compared prior exam. This finding could be compatible with response to therapy though continued close interval follow-up until resolution is recommended.   -PET/CT 8/10/20:  Compared to FDG-PET/CT scan dated 5/4/2020:  1. Mildly FDG-avid spiculated right upper lobe lung mass is unchanged in size and metabolism.  2. Nonspecific small FDG-avid mediastinal and bilateral hilar lymph nodes are unchanged or decreased in metabolism.  3. New approximately symmetric FDG activity in bilateral adrenal glands, without new corresponding abnormalities on CT, may be physiologic.  4. Non-FDG-avid sclerotic lesions, unchanged, compatible with treated bone metastases.   -CT Head 10/26/20:  No acute intracranial hemorrhage, mass effect, or evidence of acute vascular territorial infarction.  Previously described right frontal subinsular lesion is not well visualized on this examination.  More sensitive evaluation for intracranial metastatic disease, with contrast-enhanced brain MRI may be obtained, as clinically warranted, if no contraindications exist.  -MRI Brain 10/30/20:  Decreasing enhancement and surrounding FLAIR signal abnormality along the inferior right frontal lobe consistent with response to treatment. No new lesions are identified.  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    -PET/CT 11/13/20:  Compared to FDG-PET/CT scan dated 8/10/2020: 1. Mildly FDG-avid spiculated right upper lobe nodule is unchanged in size and metabolism. 2. Nonspecific small FDG-avid mediastinal and bilateral hilar lymph nodes, unchanged. 3. Mild, symmetric FDG activity in bilateral adrenal glands, without corresponding abnormalities on CT, unchanged likely physiologic. 4. New FDG-avid fractures, posterior aspect right 11th and 12th ribs. Correlate clinically for history of trauma. Few non-FDG-avid sclerotic lesions, unchanged, compatible with treated bone metastases.  -Abdominal U/S 12/4/20:  Etiology of patient's elevated LFTs is not elucidated on this study.  No hydronephrosis.  -PET/CT 2/12/21:  Compared to FDG-PET/CT scan dated 11/13/2020: 1. Mildly FDG-avid spiculated right upper lobe nodule is unchanged in size and metabolism, compatible with treated disease. 2. Few new small FDG-avid right axillary lymph nodes are nonspecific, probably reactive. Further evaluation may be performed with ultrasound. Nonspecific small FDG-avid mediastinal and bilateral hilar lymph nodes, unchanged in number and avidity. Right paratracheal node demonstrates new calcification. 3. Probable physiologic FDG activity, bilateral adrenal glands, unchanged. 4. Mild FDG activity associated with fractures in right 11th and 12th ribs, decreased as compared to prior study. Few non-FDG-avid sclerotic lesions, unchanged, compatible with treated bone metastases.  -MRI Brain 2/25/21:  Small focus of enhancement in the right frontal lobe without significant interval change from the most recent exam compatible with a treated metastasis.  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  -Pet/CT 6/21/21:  Compared to FDG-PET/CT scan dated 2/12/2021: 1. Mildly FDG avid spiculated right upper lobe nodule is unchanged in size and metabolism, compatible with treated disease. 2. Nonspecific new mild FDG avidity of bilateral level Ib cervical lymph nodes which have minimally increased in size. Please correlate clinically and with ultrasound for further evaluation as indicated. 3. Interval decrease in size and FDG avidity of nonspecific right axillary lymph nodes. Nonspecific small FDG avid mediastinal and bilateral hilar lymph nodes, unchanged in number and avidity. 4. Probable physiologic FDG activity, bilateral adrenal glands, not significantly changed. 5. Interval further decreased FDG activity associated with fractures in the right 11th-12th ribs. A few non-FDG avid sclerotic lesions, unchanged, compatible with treated bone metastasis. 6. Unchanged mild gastric hypermetabolism. Please correlate clinically for gastritis.  -EKG 6/25/21:  NSR with occasional PVC's   -MRI Brain 7/1/21: No new lesions are identified. Stable appearance of a previously treated metastatic lesion as described. Stable 3 mm enhancing lesion in the left internal auditory canal which likely represents a vestibular schwannoma.  -MRI C-spine 8/2/2021: Degenerative changes of the cervical spine with moderate central canal stenosis at C5-6.  No definite cord signal abnormality.  No abnormal enhancement or mass lesion seen.  -PET/CT 10/5/21:  Compared to FDG-PET/CT scan 6/21/2021: Findings suggestive of progression of disease. 1. Increased FDG avidity and unchanged size of spiculated right upper lobe pulmonary nodule. 2. New or increased FDG avid mediastinal and right hilar lymph nodes. 3. New FDG avidity associated with several unchanged small lytic and small sclerotic lesions in the pelvic bones. 4. Unchanged mild symmetrical FDG activity in bilateral adrenal glands, probably physiologic. 5. Unchanged mild gastric hypermetabolism, probably inflammatory. Please correlate clinically. 6. Unchanged nonspecific, mild fairly symmetric prominent FDG activity in the tonsillar regions, possibly inflammatory. Please correlate with direct visualization. Unchanged mildly FDG avid bilateral level Ib cervical lymph nodes, possibly reactive.  -MRI Brain 10/5/21:  There are 2 small enhancing lesions which are new since the prior exam of 7/1/2021 suspicious for metastasis. A 5 mm focus is in the right frontal lobe along the motor strip and a 3 mm focus is in the left frontal lobe in the subcortical white matter of the precentral gyrus.  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  -US Pelvis 11/3/21:  Nodular echogenic focus within the endometrium suspicious for polyp.  Unremarkable appearance of the ovaries  -[facility] 11/19/21:  No alterations identified.    -MRI Brain 12/5/21:  Abnormal enhancing lesions are again seen and slightly small in size which could be due to response to therapy. Continued close interval follow-up is recommended.  -CT Chest Angio 12/7/21 (COMPARED TO CT 1/7/20 AND NOT INTERVAL PET/CT'S):  No pulmonary embolus.  Right upper lobe mass similar to 01/07/2020.  Question of new metastasis to the right hilum.  Apparent skeletal metastases unchanged  -CT C/A/P 1/26/22:  Decreased size of a neoplasm in the right upper lobe.  Stable bone metastases. No finding to suggest new or progressive disease.  -Nuc Med Bone Scan 1/26/22:  Bone scan demonstrates:  No scan abnormality corresponding to sclerotic foci in the spine and pelvis.  FDG avid foci in the pelvis seen on prior PET/CT are not identified.  Degenerative disease in the major joints.  -MRI Brain 2/15/22:  Resolution of previously seen tiny metastatic foci within the precentral gyri.  Small enhancing focus within the right frontal operculum compatible with the sequelae of a treated metastasis, unchanged from the prior exams.  Stable left intracanalicular vestibular schwannoma.  -CT C/A/P 4/22/22:  Stable examination when compared to 1/26/2022, in particular stable spiculated mass in the right upper lobe at 1.5 cm.  Non specific 6 mm right lower paratracheal lymph node, attention on follow-up recommended.  Stable bony lesions concerning for metastatic disease.  No new metastatic disease.  -MRI Brain 5/14/22:   1. No evidence of recurrent metastatic disease. 2. A punctate enhancing focus and associated susceptibility artifact in the ventral posterior aspect of the right frontal lobe, compatible with sequela of treated metastasis, is stable. 3. 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  -CT C/A/P 7/22/22:  New patchy groundglass opacity in the right lower lobe, likely infectious/inflammatory. Enlarging indeterminate mediastinal and hilar lymphadenopathy. Follow-up chest CT in 3 months or as per clinical protocol.  Stable spiculated right apical nodule and osteosclerotic metastases.  No new disease in the abdomen or pelvis.  -[facility] 7/29/22:  No alterations identified.    -MRI Brain 8/19/22:  No evidence of intracranial metastatic disease. Resolution of previously seen precentral gyrus subcentimeter enhancing metastases. A focal area of T2 hyperintensity is seen in the region of the previously seen left-sided enhancing lesion likely representing the sequelae of prior radiation therapy. Continued follow-up is advised.  Stable left internal auditory canal vestibular schwannoma.  Stable area of enhancement with associated susceptibility artifact within the posterior right frontal lobe compatible with sequela of treated metastasis.  -CT C/A/P 10/21/22:  Since 7/22/2022:  Stable right upper lobe spiculated right upper lobe nodule.  Stable mildly enlarged hilar lymph nodes (although increased compared to 4/22/2022).  Stable sclerotic bone lesions.  No finding to suggest new or progressive disease.  -MRI Brain 11/23/22:   * STABLE VAGUE ENHANCEMENT, MILD INCREASED T2 SIGNAL, AND HEMOSIDERIN DEPOSITION POSTERIOR SUPERIOR ASPECT RIGHT INSULA AT SITE OF PRIOR TREATED METASTASIS. 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 * NO NEW ENHANCING LESIONS IDENTIFIED.  -CT C/A/P 1/20/23:  Ill-defined nodular opacity in the right upper lobe is unchanged when compared to previous exam.  No significant change in the sclerotic focus in the T11 vertebral body when compared to previous exam.  -MRI C/T-Spine 3/23/23:  No findings of osseous malignancy. No abnormal cord enhancement or cord signal abnormality.  -MRI L-Spine 3/23/23:  No findings of osseous or leptomeningeal malignancy.  -MRI Brain 3/23/23:   Similar minimal curvilinear enhancement along the posterior insula ([id number]). Similar minimal associated T2 FLAIR signal abnormality.  Minimally decreased size of previously seen focus of enhancement in the distal left IAC, currently 2.5 mm, previously 3 mm.  No new abnormal parenchymal or leptomeningeal enhancement.  -CT C/A/P 5/5/23:  Stable exam. No new disease in the chest, abdomen or pelvis.  Unchanged spiculated right apical nodule, sub-4 mm left upper lobe nodules and multiple osteosclerotic lesions.  -MRI Brain 7/24/23:  Stable vague enhancement, mild increased T2 signal, and hemosiderin deposition posterior superior aspect right insula at site of prior treated metastasis. Stable 3 mm vestibular schwannoma fundus left IAC. No new enhancing lesions identified.  -CT C/A/P 8/18/23:  Stable exam. Unchanged 1.1 cm right apical nodule, few scattered bilateral sub-4 mm pulmonary nodules, and osteosclerotic lesions.  -MRI Brain 12/1/23:  No new areas of abnormal enhancement. Increase in size of the cortical focus of increased T2 FLAIR signal within the posterior aspect of the left frontal lobe at site of a prior treated metastasis. No associated abnormal enhancement. New 4 x 3 mm region of increased T2 signal posterior left frontal lobe near the vertex with associated decreased T1 signal and without associated enhancement. Attention to these areas on follow-up recommended.  -CT C/A/P 12/1/23:  Stable 1.2 cm right apical spiculated nodular density with stable additional less than 0.4 cm nodules. No new or enlarging pulmonary nodules or consolidations. Stable appearance of sclerotic lesions throughout the visualized osseous structures. No new evidence of metastatic disease. Abnormal appearing endometrium in a patient postmenopausal, recommend pelvic ultrasound.  -TV U/S 1/3/24:  Thickened, cystic endometrium measuring up to 1.0 cm. Gynecologic consultation for tissue sampling is recommended.  -Screening Mammo/Sono 1/25/24:  No mammographic or sonographic evidence of malignancy.  -CT C/A/P 4/2/24:  Interval disease stability within the chest, abdomen, and pelvis compared to 12/1/2023:  Right apical 1.3 cm irregular lesion and right lower lobe pulmonary micronodule unchanged. Sclerotic osseous lesions unchanged. Similar-appearing thickening of the endometrium.  -MRI Brain 5/2/24:   1. No acute intracranial abnormality. No findings suspicious for new intracranial metastases. 2. Unchanged left vestibular schwannoma. 3. Mild interval progression of overall very mild white matter disease which may be the sequelae of treatment effect, chronic microvascular ischemic disease, vasculitis, chronic migraines, and/or Lyme disease among other possibilities. 4. Nonspecific progression of an ovoid T1 hypointense focus in the medial left parietal lobe. Attention on follow-up exams is advised.  -CT CAP 8/10/24:  Stable exam, with unchanged 1.2 cm right apical nodule, punctate posterior right lower lobe nodule and osteosclerotic lesions. Unchanged indeterminate endometrial thickening. No new disease in the chest, abdomen or pelvis.  -MRI head w/wo con 8/15/24:  Minimal increase in size of multiple FLAIR hyperintense, nonenhancing lesions in the bilateral precentral gyri and right medial frontoparietal white matter. Spectroscopy evaluation of the left medial precentral gyrus lesion is nonspecific. Lesions that are amenable to perfusion evaluation demonstrate no hyperperfusion. Evolving posttreatment changes are considered but continued follow-up is recommended.  -PET/CT 11/22/24:   1. Since 6/21/2021 FDG PET/CT, no significant interval change of the spiculated 1.8 cm right upper lobe lung nodule demonstrating mild FDG activity. No new hypermetabolic foci in the thorax. 2. Interval increased size of mildly avid RIGHT sclerotic sacral lesion, previously was nonavid. Other are non-FDG avid sclerotic lesions compatible for treated bone metastases are stable. 3. Persistent mild nonspecific activity within bilateral 1B cervical lymph nodes, may be inflammatory. 4. Stable nonspecific mildly avid mediastinal and bilateral hilar lymph nodes. Resolution of previously noted right hilar lymph noted activity. 5. Stable likely physiologic activity of bilateral adrenal glands. 6. Previously noted mild gastric hypermetabolism not visualized.  Images reviewed/interpreted:  - MR brain w/wo contrast 2/15/25: Interval mild increase in 1.5 cm T2 FLAIR hyperintense, nonenhancing lesion in the left medial precentral gyrus compared with 8/15/2024. Additional T2 FLAIR hyperintense, nonenhancing lesions in the right medial frontoparietal white matter and the bilateral precentral gyri are not significantly changed compared to 8/15/2024. No new enhancing lesions identified.  - CT C/A/P with contrast 3/8/25: Enlarging right sacral osteosclerotic lesion (corresponding to the FDG avidity noted on recent PET/CT). Other osteosclerotic metastases appear grossly unchanged. Stable 1.6 cm right apical nodule and areas of mild interlobular septal thickening in the mid to lower lungs. Unchanged thickening of the adrenal glands. Unchanged endometrial thickening.

## 2025-03-13 NOTE — HISTORY OF PRESENT ILLNESS
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  She presented with symptomatic brain metastasis and was found to have a RUL primary tumor with intrathoracic LN, bone metastases, liver metastasis and brain metastasis.  Biopsy of the primary tumor in late Feb 2019 revealed her diagnosis.  She was treated with SRS to the brain in March 2019 with nice response.  She was started on first line Alectinib in April 2019 and achieved CT.    Patient is status post admission at [facility] from 11/27-12/4/2020 for treatment of disseminated histoplasmosis.  She was started on AmBisome with a plan to insert a PICC line however one of her blood cultures grew multiple organisms delaying the PICC line placement.  Subsequent fungal and bacterial cultures were negative.  During her hospital stay, she developed ANDREW which was felt to be secondary to the AmBisome however her alectinib was held during the hospital stay.  Abdominal ultrasound was unremarkable.  She was transfused 1 unit PRBCs prior to discharge.  She was discharged with plan to treat the histoplasmosis with oral posaconazole in the outpatient setting.  Patient has subsequently been on treatment with dose-reduced Alectinib 450mg BID since late December 2020 due to renal function.   She started Posaconazole in mid-March with the Alectinib.  Alectinib was stopped in late June 2021 due to poor tolerability with the combination.   Patient was started on Pemetrexed chemotherapy in July 2021 in effort to maintain control of her lung cancer while preserving sensitivity to ALK inhibitors and allowing her to be treated with Posaconazole for the disseminated histo.  She received 1 cycle of chemotherapy with Pemetrexed that was very poorly tolerated.  She completed roughly 1 year of Posaconazole in late April 2022.   She had repeat BM Bx in Sept 2021 showing evidence of persistent histoplasmosis and was continued on Posaconazole. Restaging PET/CT in early October with evidence of disease progression.  She restarted Alectinib 450mg BID on 10/9/21.   MRI Brain on 10/5/21 revealed two new small metastases; she had f/u with Rad-Onc and given her asymptomatic status coupled with restarting the Alectinib, the plan was to do a short-term f/u MRI at a 2 month interval in early December.    Developed return of hemolysis in November 2021.  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  Alectinib discontinued in mid-November 2021.   Started 2nd line Brigatinib in November 2021; achieved CT.  [facility] testing in July 2022 without any alterations.   Patient's case was reviewed at thoracic tumor board in December 2024: There is felt to be no clear role for the inclusion of consolidative thoracic RT at this juncture.  [de-identified] : -Lung, RUL CT-guided FNA 2/26/19:  Positive for malignant cells.  Adenocarcinoma.  PDL1 positive at 5%.  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  [de-identified] : Ms. Fields continues on second line systemic therapy with brigatinib targeting her EML4-ALK fusion since Nov 2021; achieved SD.   Reports adherence with brigatinib 180mg by mouth daily without missing doses and denies brigatinib side effects. Does report currently having loose stools up to 1-2x/day, not daily and is dependent on type of food eaten. She attributes this to metformin started recently. Reports chronic myalgias to the left shoulder/left upper back area, can travel to left side of neck to left occipital area; she feels this is increased when mentally stressed.  Uses salon-pas topical and sometimes takes tizanidine with benefit.  Reports history of left shoulder bursitis and that she found relief with cortisone injection at [facility] many years ago and states she may seek injection again.  She requests to change omeprazole prescription to pantoprazole.  She asked if she needs to continue folic acid supplement upon medication review.  Reports recent urinary urgency and believes she has a UTI and asks for abx.  Denies fever, flank pain, hematuria, foul smelling or cloudy urine.   Restaging brain MRI 2/15/25 reported interval mild increase in 1.5cm T2 FLAIR hyperintense, nonenhancing lesion in the left medial precentral gyrus compared to 8/15/24. No significant change to T2 FLAIR hyperintense, nonenhancing lesions right medial frontoparietal white matter and bilateral precentral gyri. No new enhancing lesions.  Followed up with Dr. Elkins and met with Dr. Alicia with plans for MRI brain w/wo contrast Quicktome protocol and MR spectroscopy and MR perfusion.  Restaging CT C/A/P 3/8/25 reported enlarging osteosclerotic lesion corresponding to FDG avidity on 11/22/24 PET CT. Other findings stable.

## 2025-03-13 NOTE — ASSESSMENT
[Medication(s)] : Medication(s) [FreeTextEntry1] : Never-smoking woman with metastatic NSCLC with adenocarcinoma histology with ALK rearrangement. She was treated with gamma knife RT to brain metastasis in March 2019. She started systemic therapy with Alectinib in April 2019 and achieved ND. Alectinib dose-reduced in late December 2020 due to renal function decline possibly due to anti-fungal agents for disseminated histoplasmosis. Started treatment with oral Posaconazole for histoplasmosis in mid-March 2021. Alectinib has been poorly tolerated in combination with Posaconazole, requiring dose-reductions of Alectinib. She was intolerant of the combination. Patient with sustained response on restaging PET/CT in June 2021. Alectinib was discontinued in late June 2021 due to poor tolerability in combination with posaconazole for disseminated histoplasmosis. Treated with 1 cycle of Pemetrexed chemotherapy on 7/9/21 with very poor tolerability. Further systemic therapy for lung cancer was held from July-October 2021. Restaging PET/CT October 2021 with disease progression. Restarted Alectinib 450mg BID on 10/9/21. She increased Alectinib to full dosing in late October 2021. Developed return of hemolysis in November and Alectinib permanently discontinued on 11/15/21 due to impression of drug-induced hemolytic anemia associated with this agent. Hemolysis resolved with discontinuation of offending agent (Alectinib). [facility] ct-DNA testing 11/19/21 with no evidence of ALK resistance mutations. Started 2nd line Brigatinib in late November 2021; achieved ND. Restaging brain MRI 2/15/25 with mild increase in left medial precentral gyrus lesion - further imaging planned with Dr. Elkins and Dr. Alicia. Restaging CT C/A/P 3/8/25 showed increased size of osteosclerotic sacral lesion corresponding to area of increased FDG avidity on Nov 2024 scan.  Recommend: -Continue treatment with 2nd line Brigatinib targeting her ALK rearrangement for as long as it benefits the patient, currently at 180mg PO daily. -Repeat labs today. Can monitor CEA; not clear if informative. Monitor CPK, amylase/lipase while on brigatinib.  No clear role/benefit for monitoring other tumor markers.   -Discuss RT to oligoprogression to sacrum with Dr. Hart or Dr. Elkins.  Will sort out.  -Anemia:  monitor hemoglobin and periodic iron studies  -Can change omeprazole to pantoprazole per pt request, prescription sent -Can discontinue folic acid; patient previously on this when having hemolysis  -Dysuria. Can treat with Bactrim DS x 3 days. UA/UC sent.  -Questionable Histoplasmosis in BM s/p 1 year of antifungal therapy completed April 2022. Being monitored off antifungal; BM results possibly artifactual. Previously followed with ID. -F/U with Neuro-Oncologist. Titrated off Briviact. -F/U with Rad-Onc Dr. Elkins for management of Brain metastases. Recently saw Dr. Alicia due to Feb 2025 MRI findings. Planned for additional MRI testing including MRI brain w/wo contrast Quicktome protocol, and MR spectroscopy and MR perfusion, which pt states is limited to [facility] location and is awaiting rescheduling.  -Given the previous deep response in bone metastases, avoided the addition of a bone modifying agent - Information provided for PMR for myalgias and hx left shoulder bursitis.  - Follow-up in early May - Check out form given to patient with instructions for making appointments

## 2025-03-13 NOTE — PHYSICAL EXAM
[Fully active, able to carry on all pre-disease performance without restriction] : Status 0 - Fully active, able to carry on all pre-disease performance without restriction [Normal] : affect appropriate [de-identified] : No icterus  [de-identified] : No LAD [de-identified] : Clear [de-identified] : S1 S2 [de-identified] : No edema [de-identified] : No spine/CVA tenderness [de-identified] : Ambulatory

## 2025-03-13 NOTE — PHYSICAL EXAM
[Fully active, able to carry on all pre-disease performance without restriction] : Status 0 - Fully active, able to carry on all pre-disease performance without restriction [Normal] : affect appropriate [de-identified] : No icterus  [de-identified] : No LAD [de-identified] : Clear [de-identified] : S1 S2 [de-identified] : No edema [de-identified] : No spine/CVA tenderness [de-identified] : Ambulatory

## 2025-03-13 NOTE — RESULTS/DATA
[FreeTextEntry1] : -PET/CT 10/4/19:  Right upper lobe mass, mediastinal and hilar lymph nodes appear significantly smaller and less intense than prior PET/CT from March 2019 and essentially unchanged from CT from June 2019.  There is a 5 mm right middle lobe nodule appearing more prominent than the prior study.  -CT Head 11/26/19:  No acute intracranial hemorrhage.  Vasogenic edema in the right frontoparietal region in area of known metastasis as seen on MR 10/1/2019.   -MRI Brain 11/27/19:  Enhancing lesion in the right frontal operculum just medial to the sylvian fissure is larger when compared to 10/1/2019 but is significantly smaller compared with 3/13/2019 and may represent neoplasm versus post therapy changes. No change tiny enhancing nodule left internal auditory canal.   -CT C/A/P 1/7/20:  Spiculated appearing mass in the right upper lobe, consistent with patient's known lung cancer. Comparison is made to a prior CT scan of the abdomen and pelvis which was performed on 2/18/2019. The sclerotic lesions in the left iliac bone were not seen on the prior exam. The sclerotic lesion in the right sacrum is also new. The lesion in the right iliac bone was seen previously.  The sclerotic lesion in the T11 vertebral body has significantly increased in size compared to the prior exam. These findings are concerning for progression of osseous metastasis.   -PET/CT 1/31/20:  Abnormal FDG-PET/CT scan.  1. Mildly FDG-avid spiculated right upper lobe lung mass is decreased in size and metabolism as compared to PET/CT dated 10/4/2019, and is not significantly changed as compared to CT dated 1/7/2020.  2. Resolution of small, mildly FDG-avid right paratracheal lymph node.  3. Non-FDG-avid sclerotic lesions, unchanged as compared to prior PET/CT, and interval diagnostic CT, are compatible with treated bone metastases.   -MRI Brain 2/27/20:  Similar-appearing enhancing right subinsular and right frontal opercular  lesions as detailed in the body the report. Decreased edema surrounding the larger right subinsular lesion. No abnormal leptomeningeal enhancement   -PET/CT 5/4/20:  Abnormal FDG-PET/CT scan.  1. Mildly FDG-avid spiculated right upper lobe lung mass is unchanged on CT, and minimally increased in metabolism as compared to prior study dated 1/31/2020. Etiology is indeterminate. Small focus of residual disease is not excluded.  2. New small hypermetabolic right perihilar and right paratracheal lymph nodes are nonspecific.  3. Non-FDG-avid sclerotic lesions, unchanged, compatible with treated bone metastases.   -MRI Brain 5/27/20:  Unchanged size of enhancing lesion in the right subinsular region with increased surrounding vasogenic edema. Continued close interval follow-up is recommended.   -MRI Brain 7/30/20:  Abnormal enhancing lesion with surrounding edema is again identified though decreased size and surrounding edema seen when compared prior exam. This finding could be compatible with response to therapy though continued close interval follow-up until resolution is recommended.   -PET/CT 8/10/20:  Compared to FDG-PET/CT scan dated 5/4/2020:  1. Mildly FDG-avid spiculated right upper lobe lung mass is unchanged in size and metabolism.  2. Nonspecific small FDG-avid mediastinal and bilateral hilar lymph nodes are unchanged or decreased in metabolism.  3. New approximately symmetric FDG activity in bilateral adrenal glands, without new corresponding abnormalities on CT, may be physiologic.  4. Non-FDG-avid sclerotic lesions, unchanged, compatible with treated bone metastases.   -CT Head 10/26/20:  No acute intracranial hemorrhage, mass effect, or evidence of acute vascular territorial infarction.  Previously described right frontal subinsular lesion is not well visualized on this examination.  More sensitive evaluation for intracranial metastatic disease, with contrast-enhanced brain MRI may be obtained, as clinically warranted, if no contraindications exist.  -MRI Brain 10/30/20:  Decreasing enhancement and surrounding FLAIR signal abnormality along the inferior right frontal lobe consistent with response to treatment. No new lesions are identified.  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    -PET/CT 11/13/20:  Compared to FDG-PET/CT scan dated 8/10/2020: 1. Mildly FDG-avid spiculated right upper lobe nodule is unchanged in size and metabolism. 2. Nonspecific small FDG-avid mediastinal and bilateral hilar lymph nodes, unchanged. 3. Mild, symmetric FDG activity in bilateral adrenal glands, without corresponding abnormalities on CT, unchanged likely physiologic. 4. New FDG-avid fractures, posterior aspect right 11th and 12th ribs. Correlate clinically for history of trauma. Few non-FDG-avid sclerotic lesions, unchanged, compatible with treated bone metastases.  -Abdominal U/S 12/4/20:  Etiology of patient's elevated LFTs is not elucidated on this study.  No hydronephrosis.  -PET/CT 2/12/21:  Compared to FDG-PET/CT scan dated 11/13/2020: 1. Mildly FDG-avid spiculated right upper lobe nodule is unchanged in size and metabolism, compatible with treated disease. 2. Few new small FDG-avid right axillary lymph nodes are nonspecific, probably reactive. Further evaluation may be performed with ultrasound. Nonspecific small FDG-avid mediastinal and bilateral hilar lymph nodes, unchanged in number and avidity. Right paratracheal node demonstrates new calcification. 3. Probable physiologic FDG activity, bilateral adrenal glands, unchanged. 4. Mild FDG activity associated with fractures in right 11th and 12th ribs, decreased as compared to prior study. Few non-FDG-avid sclerotic lesions, unchanged, compatible with treated bone metastases.  -MRI Brain 2/25/21:  Small focus of enhancement in the right frontal lobe without significant interval change from the most recent exam compatible with a treated metastasis.  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  -Pet/CT 6/21/21:  Compared to FDG-PET/CT scan dated 2/12/2021: 1. Mildly FDG avid spiculated right upper lobe nodule is unchanged in size and metabolism, compatible with treated disease. 2. Nonspecific new mild FDG avidity of bilateral level Ib cervical lymph nodes which have minimally increased in size. Please correlate clinically and with ultrasound for further evaluation as indicated. 3. Interval decrease in size and FDG avidity of nonspecific right axillary lymph nodes. Nonspecific small FDG avid mediastinal and bilateral hilar lymph nodes, unchanged in number and avidity. 4. Probable physiologic FDG activity, bilateral adrenal glands, not significantly changed. 5. Interval further decreased FDG activity associated with fractures in the right 11th-12th ribs. A few non-FDG avid sclerotic lesions, unchanged, compatible with treated bone metastasis. 6. Unchanged mild gastric hypermetabolism. Please correlate clinically for gastritis.  -EKG 6/25/21:  NSR with occasional PVC's   -MRI Brain 7/1/21: No new lesions are identified. Stable appearance of a previously treated metastatic lesion as described. Stable 3 mm enhancing lesion in the left internal auditory canal which likely represents a vestibular schwannoma.  -MRI C-spine 8/2/2021: Degenerative changes of the cervical spine with moderate central canal stenosis at C5-6.  No definite cord signal abnormality.  No abnormal enhancement or mass lesion seen.  -PET/CT 10/5/21:  Compared to FDG-PET/CT scan 6/21/2021: Findings suggestive of progression of disease. 1. Increased FDG avidity and unchanged size of spiculated right upper lobe pulmonary nodule. 2. New or increased FDG avid mediastinal and right hilar lymph nodes. 3. New FDG avidity associated with several unchanged small lytic and small sclerotic lesions in the pelvic bones. 4. Unchanged mild symmetrical FDG activity in bilateral adrenal glands, probably physiologic. 5. Unchanged mild gastric hypermetabolism, probably inflammatory. Please correlate clinically. 6. Unchanged nonspecific, mild fairly symmetric prominent FDG activity in the tonsillar regions, possibly inflammatory. Please correlate with direct visualization. Unchanged mildly FDG avid bilateral level Ib cervical lymph nodes, possibly reactive.  -MRI Brain 10/5/21:  There are 2 small enhancing lesions which are new since the prior exam of 7/1/2021 suspicious for metastasis. A 5 mm focus is in the right frontal lobe along the motor strip and a 3 mm focus is in the left frontal lobe in the subcortical white matter of the precentral gyrus.  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  -US Pelvis 11/3/21:  Nodular echogenic focus within the endometrium suspicious for polyp.  Unremarkable appearance of the ovaries  -[facility] 11/19/21:  No alterations identified.    -MRI Brain 12/5/21:  Abnormal enhancing lesions are again seen and slightly small in size which could be due to response to therapy. Continued close interval follow-up is recommended.  -CT Chest Angio 12/7/21 (COMPARED TO CT 1/7/20 AND NOT INTERVAL PET/CT'S):  No pulmonary embolus.  Right upper lobe mass similar to 01/07/2020.  Question of new metastasis to the right hilum.  Apparent skeletal metastases unchanged  -CT C/A/P 1/26/22:  Decreased size of a neoplasm in the right upper lobe.  Stable bone metastases. No finding to suggest new or progressive disease.  -Nuc Med Bone Scan 1/26/22:  Bone scan demonstrates:  No scan abnormality corresponding to sclerotic foci in the spine and pelvis.  FDG avid foci in the pelvis seen on prior PET/CT are not identified.  Degenerative disease in the major joints.  -MRI Brain 2/15/22:  Resolution of previously seen tiny metastatic foci within the precentral gyri.  Small enhancing focus within the right frontal operculum compatible with the sequelae of a treated metastasis, unchanged from the prior exams.  Stable left intracanalicular vestibular schwannoma.  -CT C/A/P 4/22/22:  Stable examination when compared to 1/26/2022, in particular stable spiculated mass in the right upper lobe at 1.5 cm.  Non specific 6 mm right lower paratracheal lymph node, attention on follow-up recommended.  Stable bony lesions concerning for metastatic disease.  No new metastatic disease.  -MRI Brain 5/14/22:   1. No evidence of recurrent metastatic disease. 2. A punctate enhancing focus and associated susceptibility artifact in the ventral posterior aspect of the right frontal lobe, compatible with sequela of treated metastasis, is stable. 3. 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  -CT C/A/P 7/22/22:  New patchy groundglass opacity in the right lower lobe, likely infectious/inflammatory. Enlarging indeterminate mediastinal and hilar lymphadenopathy. Follow-up chest CT in 3 months or as per clinical protocol.  Stable spiculated right apical nodule and osteosclerotic metastases.  No new disease in the abdomen or pelvis.  -[facility] 7/29/22:  No alterations identified.    -MRI Brain 8/19/22:  No evidence of intracranial metastatic disease. Resolution of previously seen precentral gyrus subcentimeter enhancing metastases. A focal area of T2 hyperintensity is seen in the region of the previously seen left-sided enhancing lesion likely representing the sequelae of prior radiation therapy. Continued follow-up is advised.  Stable left internal auditory canal vestibular schwannoma.  Stable area of enhancement with associated susceptibility artifact within the posterior right frontal lobe compatible with sequela of treated metastasis.  -CT C/A/P 10/21/22:  Since 7/22/2022:  Stable right upper lobe spiculated right upper lobe nodule.  Stable mildly enlarged hilar lymph nodes (although increased compared to 4/22/2022).  Stable sclerotic bone lesions.  No finding to suggest new or progressive disease.  -MRI Brain 11/23/22:   * STABLE VAGUE ENHANCEMENT, MILD INCREASED T2 SIGNAL, AND HEMOSIDERIN DEPOSITION POSTERIOR SUPERIOR ASPECT RIGHT INSULA AT SITE OF PRIOR TREATED METASTASIS. 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 * NO NEW ENHANCING LESIONS IDENTIFIED.  -CT C/A/P 1/20/23:  Ill-defined nodular opacity in the right upper lobe is unchanged when compared to previous exam.  No significant change in the sclerotic focus in the T11 vertebral body when compared to previous exam.  -MRI C/T-Spine 3/23/23:  No findings of osseous malignancy. No abnormal cord enhancement or cord signal abnormality.  -MRI L-Spine 3/23/23:  No findings of osseous or leptomeningeal malignancy.  -MRI Brain 3/23/23:   Similar minimal curvilinear enhancement along the posterior insula ([id number]). Similar minimal associated T2 FLAIR signal abnormality.  Minimally decreased size of previously seen focus of enhancement in the distal left IAC, currently 2.5 mm, previously 3 mm.  No new abnormal parenchymal or leptomeningeal enhancement.  -CT C/A/P 5/5/23:  Stable exam. No new disease in the chest, abdomen or pelvis.  Unchanged spiculated right apical nodule, sub-4 mm left upper lobe nodules and multiple osteosclerotic lesions.  -MRI Brain 7/24/23:  Stable vague enhancement, mild increased T2 signal, and hemosiderin deposition posterior superior aspect right insula at site of prior treated metastasis. Stable 3 mm vestibular schwannoma fundus left IAC. No new enhancing lesions identified.  -CT C/A/P 8/18/23:  Stable exam. Unchanged 1.1 cm right apical nodule, few scattered bilateral sub-4 mm pulmonary nodules, and osteosclerotic lesions.  -MRI Brain 12/1/23:  No new areas of abnormal enhancement. Increase in size of the cortical focus of increased T2 FLAIR signal within the posterior aspect of the left frontal lobe at site of a prior treated metastasis. No associated abnormal enhancement. New 4 x 3 mm region of increased T2 signal posterior left frontal lobe near the vertex with associated decreased T1 signal and without associated enhancement. Attention to these areas on follow-up recommended.  -CT C/A/P 12/1/23:  Stable 1.2 cm right apical spiculated nodular density with stable additional less than 0.4 cm nodules. No new or enlarging pulmonary nodules or consolidations. Stable appearance of sclerotic lesions throughout the visualized osseous structures. No new evidence of metastatic disease. Abnormal appearing endometrium in a patient postmenopausal, recommend pelvic ultrasound.  -TV U/S 1/3/24:  Thickened, cystic endometrium measuring up to 1.0 cm. Gynecologic consultation for tissue sampling is recommended.  -Screening Mammo/Sono 1/25/24:  No mammographic or sonographic evidence of malignancy.  -CT C/A/P 4/2/24:  Interval disease stability within the chest, abdomen, and pelvis compared to 12/1/2023:  Right apical 1.3 cm irregular lesion and right lower lobe pulmonary micronodule unchanged. Sclerotic osseous lesions unchanged. Similar-appearing thickening of the endometrium.  -MRI Brain 5/2/24:   1. No acute intracranial abnormality. No findings suspicious for new intracranial metastases. 2. Unchanged left vestibular schwannoma. 3. Mild interval progression of overall very mild white matter disease which may be the sequelae of treatment effect, chronic microvascular ischemic disease, vasculitis, chronic migraines, and/or Lyme disease among other possibilities. 4. Nonspecific progression of an ovoid T1 hypointense focus in the medial left parietal lobe. Attention on follow-up exams is advised.  -CT CAP 8/10/24:  Stable exam, with unchanged 1.2 cm right apical nodule, punctate posterior right lower lobe nodule and osteosclerotic lesions. Unchanged indeterminate endometrial thickening. No new disease in the chest, abdomen or pelvis.  -MRI head w/wo con 8/15/24:  Minimal increase in size of multiple FLAIR hyperintense, nonenhancing lesions in the bilateral precentral gyri and right medial frontoparietal white matter. Spectroscopy evaluation of the left medial precentral gyrus lesion is nonspecific. Lesions that are amenable to perfusion evaluation demonstrate no hyperperfusion. Evolving posttreatment changes are considered but continued follow-up is recommended.  -PET/CT 11/22/24:   1. Since 6/21/2021 FDG PET/CT, no significant interval change of the spiculated 1.8 cm right upper lobe lung nodule demonstrating mild FDG activity. No new hypermetabolic foci in the thorax. 2. Interval increased size of mildly avid RIGHT sclerotic sacral lesion, previously was nonavid. Other are non-FDG avid sclerotic lesions compatible for treated bone metastases are stable. 3. Persistent mild nonspecific activity within bilateral 1B cervical lymph nodes, may be inflammatory. 4. Stable nonspecific mildly avid mediastinal and bilateral hilar lymph nodes. Resolution of previously noted right hilar lymph noted activity. 5. Stable likely physiologic activity of bilateral adrenal glands. 6. Previously noted mild gastric hypermetabolism not visualized.  Images reviewed/interpreted:  - MR brain w/wo contrast 2/15/25: Interval mild increase in 1.5 cm T2 FLAIR hyperintense, nonenhancing lesion in the left medial precentral gyrus compared with 8/15/2024. Additional T2 FLAIR hyperintense, nonenhancing lesions in the right medial frontoparietal white matter and the bilateral precentral gyri are not significantly changed compared to 8/15/2024. No new enhancing lesions identified.  - CT C/A/P with contrast 3/8/25: Enlarging right sacral osteosclerotic lesion (corresponding to the FDG avidity noted on recent PET/CT). Other osteosclerotic metastases appear grossly unchanged. Stable 1.6 cm right apical nodule and areas of mild interlobular septal thickening in the mid to lower lungs. Unchanged thickening of the adrenal glands. Unchanged endometrial thickening.

## 2025-03-13 NOTE — HISTORY OF PRESENT ILLNESS
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  She presented with symptomatic brain metastasis and was found to have a RUL primary tumor with intrathoracic LN, bone metastases, liver metastasis and brain metastasis.  Biopsy of the primary tumor in late Feb 2019 revealed her diagnosis.  She was treated with SRS to the brain in March 2019 with nice response.  She was started on first line Alectinib in April 2019 and achieved OR.    Patient is status post admission at [facility] from 11/27-12/4/2020 for treatment of disseminated histoplasmosis.  She was started on AmBisome with a plan to insert a PICC line however one of her blood cultures grew multiple organisms delaying the PICC line placement.  Subsequent fungal and bacterial cultures were negative.  During her hospital stay, she developed ANDREW which was felt to be secondary to the AmBisome however her alectinib was held during the hospital stay.  Abdominal ultrasound was unremarkable.  She was transfused 1 unit PRBCs prior to discharge.  She was discharged with plan to treat the histoplasmosis with oral posaconazole in the outpatient setting.  Patient has subsequently been on treatment with dose-reduced Alectinib 450mg BID since late December 2020 due to renal function.   She started Posaconazole in mid-March with the Alectinib.  Alectinib was stopped in late June 2021 due to poor tolerability with the combination.   Patient was started on Pemetrexed chemotherapy in July 2021 in effort to maintain control of her lung cancer while preserving sensitivity to ALK inhibitors and allowing her to be treated with Posaconazole for the disseminated histo.  She received 1 cycle of chemotherapy with Pemetrexed that was very poorly tolerated.  She completed roughly 1 year of Posaconazole in late April 2022.   She had repeat BM Bx in Sept 2021 showing evidence of persistent histoplasmosis and was continued on Posaconazole. Restaging PET/CT in early October with evidence of disease progression.  She restarted Alectinib 450mg BID on 10/9/21.   MRI Brain on 10/5/21 revealed two new small metastases; she had f/u with Rad-Onc and given her asymptomatic status coupled with restarting the Alectinib, the plan was to do a short-term f/u MRI at a 2 month interval in early December.    Developed return of hemolysis in November 2021.  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  Alectinib discontinued in mid-November 2021.   Started 2nd line Brigatinib in November 2021; achieved OR.  [facility] testing in July 2022 without any alterations.   Patient's case was reviewed at thoracic tumor board in December 2024: There is felt to be no clear role for the inclusion of consolidative thoracic RT at this juncture.  [de-identified] : -Lung, RUL CT-guided FNA 2/26/19:  Positive for malignant cells.  Adenocarcinoma.  PDL1 positive at 5%.  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  [de-identified] : Ms. Fields continues on second line systemic therapy with brigatinib targeting her EML4-ALK fusion since Nov 2021; achieved [place].   Reports adherence with brigatinib 180mg by mouth daily without missing doses and denies brigatinib side effects. Does report currently having loose stools up to 1-2x/day, not daily and is dependent on type of food eaten. She attributes this to metformin started recently. Reports chronic myalgias to the left shoulder/left upper back area, can travel to left side of neck to left occipital area; she feels this is increased when mentally stressed.  Uses salon-pas topical and sometimes takes tizanidine with benefit.  Reports history of left shoulder bursitis and that she found relief with cortisone injection at [facility] many years ago and states she may seek injection again.  She requests to change omeprazole prescription to pantoprazole.  She asked if she needs to continue folic acid supplement upon medication review.  Reports recent urinary urgency and believes she has a UTI and asks for abx.  Denies fever, flank pain, hematuria, foul smelling or cloudy urine.   Restaging brain MRI 2/15/25 reported interval mild increase in 1.5cm T2 FLAIR hyperintense, nonenhancing lesion in the left medial precentral gyrus compared to 8/15/24. No significant change to T2 FLAIR hyperintense, nonenhancing lesions right medial frontoparietal white matter and bilateral precentral gyri. No new enhancing lesions.  Followed up with Dr. Elkins and met with Dr. Alicia with plans for MRI brain w/wo contrast Quicktome protocol and MR spectroscopy and MR perfusion.  Restaging CT C/A/P 3/8/25 reported enlarging osteosclerotic lesion corresponding to FDG avidity on 11/22/24 PET CT. Other findings stable.

## 2025-03-13 NOTE — RESULTS/DATA
[FreeTextEntry1] : -PET/CT 10/4/19:  Right upper lobe mass, mediastinal and hilar lymph nodes appear significantly smaller and less intense than prior PET/CT from March 2019 and essentially unchanged from CT from June 2019.  There is a 5 mm right middle lobe nodule appearing more prominent than the prior study.  -CT Head 11/26/19:  No acute intracranial hemorrhage.  Vasogenic edema in the right frontoparietal region in area of known metastasis as seen on MR 10/1/2019.   -MRI Brain 11/27/19:  Enhancing lesion in the right frontal operculum just medial to the sylvian fissure is larger when compared to 10/1/2019 but is significantly smaller compared with 3/13/2019 and may represent neoplasm versus post therapy changes. No change tiny enhancing nodule left internal auditory canal.   -CT C/A/P 1/7/20:  Spiculated appearing mass in the right upper lobe, consistent with patient's known lung cancer. Comparison is made to a prior CT scan of the abdomen and pelvis which was performed on 2/18/2019. The sclerotic lesions in the left iliac bone were not seen on the prior exam. The sclerotic lesion in the right sacrum is also new. The lesion in the right iliac bone was seen previously.  The sclerotic lesion in the T11 vertebral body has significantly increased in size compared to the prior exam. These findings are concerning for progression of osseous metastasis.   -PET/CT 1/31/20:  Abnormal FDG-PET/CT scan.  1. Mildly FDG-avid spiculated right upper lobe lung mass is decreased in size and metabolism as compared to PET/CT dated 10/4/2019, and is not significantly changed as compared to CT dated 1/7/2020.  2. Resolution of small, mildly FDG-avid right paratracheal lymph node.  3. Non-FDG-avid sclerotic lesions, unchanged as compared to prior PET/CT, and interval diagnostic CT, are compatible with treated bone metastases.   -MRI Brain 2/27/20:  Similar-appearing enhancing right subinsular and right frontal opercular  lesions as detailed in the body the report. Decreased edema surrounding the larger right subinsular lesion. No abnormal leptomeningeal enhancement   -PET/CT 5/4/20:  Abnormal FDG-PET/CT scan.  1. Mildly FDG-avid spiculated right upper lobe lung mass is unchanged on CT, and minimally increased in metabolism as compared to prior study dated 1/31/2020. Etiology is indeterminate. Small focus of residual disease is not excluded.  2. New small hypermetabolic right perihilar and right paratracheal lymph nodes are nonspecific.  3. Non-FDG-avid sclerotic lesions, unchanged, compatible with treated bone metastases.   -MRI Brain 5/27/20:  Unchanged size of enhancing lesion in the right subinsular region with increased surrounding vasogenic edema. Continued close interval follow-up is recommended.   -MRI Brain 7/30/20:  Abnormal enhancing lesion with surrounding edema is again identified though decreased size and surrounding edema seen when compared prior exam. This finding could be compatible with response to therapy though continued close interval follow-up until resolution is recommended.   -PET/CT 8/10/20:  Compared to FDG-PET/CT scan dated 5/4/2020:  1. Mildly FDG-avid spiculated right upper lobe lung mass is unchanged in size and metabolism.  2. Nonspecific small FDG-avid mediastinal and bilateral hilar lymph nodes are unchanged or decreased in metabolism.  3. New approximately symmetric FDG activity in bilateral adrenal glands, without new corresponding abnormalities on CT, may be physiologic.  4. Non-FDG-avid sclerotic lesions, unchanged, compatible with treated bone metastases.   -CT Head 10/26/20:  No acute intracranial hemorrhage, mass effect, or evidence of acute vascular territorial infarction.  Previously described right frontal subinsular lesion is not well visualized on this examination.  More sensitive evaluation for intracranial metastatic disease, with contrast-enhanced brain MRI may be obtained, as clinically warranted, if no contraindications exist.  -MRI Brain 10/30/20:  Decreasing enhancement and surrounding FLAIR signal abnormality along the inferior right frontal lobe consistent with response to treatment. No new lesions are identified.  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    -PET/CT 11/13/20:  Compared to FDG-PET/CT scan dated 8/10/2020: 1. Mildly FDG-avid spiculated right upper lobe nodule is unchanged in size and metabolism. 2. Nonspecific small FDG-avid mediastinal and bilateral hilar lymph nodes, unchanged. 3. Mild, symmetric FDG activity in bilateral adrenal glands, without corresponding abnormalities on CT, unchanged likely physiologic. 4. New FDG-avid fractures, posterior aspect right 11th and 12th ribs. Correlate clinically for history of trauma. Few non-FDG-avid sclerotic lesions, unchanged, compatible with treated bone metastases.  -Abdominal U/S 12/4/20:  Etiology of patient's elevated LFTs is not elucidated on this study.  No hydronephrosis.  -PET/CT 2/12/21:  Compared to FDG-PET/CT scan dated 11/13/2020: 1. Mildly FDG-avid spiculated right upper lobe nodule is unchanged in size and metabolism, compatible with treated disease. 2. Few new small FDG-avid right axillary lymph nodes are nonspecific, probably reactive. Further evaluation may be performed with ultrasound. Nonspecific small FDG-avid mediastinal and bilateral hilar lymph nodes, unchanged in number and avidity. Right paratracheal node demonstrates new calcification. 3. Probable physiologic FDG activity, bilateral adrenal glands, unchanged. 4. Mild FDG activity associated with fractures in right 11th and 12th ribs, decreased as compared to prior study. Few non-FDG-avid sclerotic lesions, unchanged, compatible with treated bone metastases.  -MRI Brain 2/25/21:  Small focus of enhancement in the right frontal lobe without significant interval change from the most recent exam compatible with a treated metastasis.  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  -Pet/CT 6/21/21:  Compared to FDG-PET/CT scan dated 2/12/2021: 1. Mildly FDG avid spiculated right upper lobe nodule is unchanged in size and metabolism, compatible with treated disease. 2. Nonspecific new mild FDG avidity of bilateral level Ib cervical lymph nodes which have minimally increased in size. Please correlate clinically and with ultrasound for further evaluation as indicated. 3. Interval decrease in size and FDG avidity of nonspecific right axillary lymph nodes. Nonspecific small FDG avid mediastinal and bilateral hilar lymph nodes, unchanged in number and avidity. 4. Probable physiologic FDG activity, bilateral adrenal glands, not significantly changed. 5. Interval further decreased FDG activity associated with fractures in the right 11th-12th ribs. A few non-FDG avid sclerotic lesions, unchanged, compatible with treated bone metastasis. 6. Unchanged mild gastric hypermetabolism. Please correlate clinically for gastritis.  -EKG 6/25/21:  NSR with occasional PVC's   -MRI Brain 7/1/21: No new lesions are identified. Stable appearance of a previously treated metastatic lesion as described. Stable 3 mm enhancing lesion in the left internal auditory canal which likely represents a vestibular schwannoma.  -MRI C-spine 8/2/2021: Degenerative changes of the cervical spine with moderate central canal stenosis at C5-6.  No definite cord signal abnormality.  No abnormal enhancement or mass lesion seen.  -PET/CT 10/5/21:  Compared to FDG-PET/CT scan 6/21/2021: Findings suggestive of progression of disease. 1. Increased FDG avidity and unchanged size of spiculated right upper lobe pulmonary nodule. 2. New or increased FDG avid mediastinal and right hilar lymph nodes. 3. New FDG avidity associated with several unchanged small lytic and small sclerotic lesions in the pelvic bones. 4. Unchanged mild symmetrical FDG activity in bilateral adrenal glands, probably physiologic. 5. Unchanged mild gastric hypermetabolism, probably inflammatory. Please correlate clinically. 6. Unchanged nonspecific, mild fairly symmetric prominent FDG activity in the tonsillar regions, possibly inflammatory. Please correlate with direct visualization. Unchanged mildly FDG avid bilateral level Ib cervical lymph nodes, possibly reactive.  -MRI Brain 10/5/21:  There are 2 small enhancing lesions which are new since the prior exam of 7/1/2021 suspicious for metastasis. A 5 mm focus is in the right frontal lobe along the motor strip and a 3 mm focus is in the left frontal lobe in the subcortical white matter of the precentral gyrus.  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  -US Pelvis 11/3/21:  Nodular echogenic focus within the endometrium suspicious for polyp.  Unremarkable appearance of the ovaries  -[facility] 11/19/21:  No alterations identified.    -MRI Brain 12/5/21:  Abnormal enhancing lesions are again seen and slightly small in size which could be due to response to therapy. Continued close interval follow-up is recommended.  -CT Chest Angio 12/7/21 (COMPARED TO CT 1/7/20 AND NOT INTERVAL PET/CT'S):  No pulmonary embolus.  Right upper lobe mass similar to 01/07/2020.  Question of new metastasis to the right hilum.  Apparent skeletal metastases unchanged  -CT C/A/P 1/26/22:  Decreased size of a neoplasm in the right upper lobe.  Stable bone metastases. No finding to suggest new or progressive disease.  -Nuc Med Bone Scan 1/26/22:  Bone scan demonstrates:  No scan abnormality corresponding to sclerotic foci in the spine and pelvis.  FDG avid foci in the pelvis seen on prior PET/CT are not identified.  Degenerative disease in the major joints.  -MRI Brain 2/15/22:  Resolution of previously seen tiny metastatic foci within the precentral gyri.  Small enhancing focus within the right frontal operculum compatible with the sequelae of a treated metastasis, unchanged from the prior exams.  Stable left intracanalicular vestibular schwannoma.  -CT C/A/P 4/22/22:  Stable examination when compared to 1/26/2022, in particular stable spiculated mass in the right upper lobe at 1.5 cm.  Non specific 6 mm right lower paratracheal lymph node, attention on follow-up recommended.  Stable bony lesions concerning for metastatic disease.  No new metastatic disease.  -MRI Brain 5/14/22:   1. No evidence of recurrent metastatic disease. 2. A punctate enhancing focus and associated susceptibility artifact in the ventral posterior aspect of the right frontal lobe, compatible with sequela of treated metastasis, is stable. 3. 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  -CT C/A/P 7/22/22:  New patchy groundglass opacity in the right lower lobe, likely infectious/inflammatory. Enlarging indeterminate mediastinal and hilar lymphadenopathy. Follow-up chest CT in 3 months or as per clinical protocol.  Stable spiculated right apical nodule and osteosclerotic metastases.  No new disease in the abdomen or pelvis.  -[facility] 7/29/22:  No alterations identified.    -MRI Brain 8/19/22:  No evidence of intracranial metastatic disease. Resolution of previously seen precentral gyrus subcentimeter enhancing metastases. A focal area of T2 hyperintensity is seen in the region of the previously seen left-sided enhancing lesion likely representing the sequelae of prior radiation therapy. Continued follow-up is advised.  Stable left internal auditory canal vestibular schwannoma.  Stable area of enhancement with associated susceptibility artifact within the posterior right frontal lobe compatible with sequela of treated metastasis.  -CT C/A/P 10/21/22:  Since 7/22/2022:  Stable right upper lobe spiculated right upper lobe nodule.  Stable mildly enlarged hilar lymph nodes (although increased compared to 4/22/2022).  Stable sclerotic bone lesions.  No finding to suggest new or progressive disease.  -MRI Brain 11/23/22:   * STABLE VAGUE ENHANCEMENT, MILD INCREASED T2 SIGNAL, AND HEMOSIDERIN DEPOSITION POSTERIOR SUPERIOR ASPECT RIGHT INSULA AT SITE OF PRIOR TREATED METASTASIS. 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 * NO NEW ENHANCING LESIONS IDENTIFIED.  -CT C/A/P 1/20/23:  Ill-defined nodular opacity in the right upper lobe is unchanged when compared to previous exam.  No significant change in the sclerotic focus in the T11 vertebral body when compared to previous exam.  -MRI C/T-Spine 3/23/23:  No findings of osseous malignancy. No abnormal cord enhancement or cord signal abnormality.  -MRI L-Spine 3/23/23:  No findings of osseous or leptomeningeal malignancy.  -MRI Brain 3/23/23:   Similar minimal curvilinear enhancement along the posterior insula ([id number]). Similar minimal associated T2 FLAIR signal abnormality.  Minimally decreased size of previously seen focus of enhancement in the distal left IAC, currently 2.5 mm, previously 3 mm.  No new abnormal parenchymal or leptomeningeal enhancement.  -CT C/A/P 5/5/23:  Stable exam. No new disease in the chest, abdomen or pelvis.  Unchanged spiculated right apical nodule, sub-4 mm left upper lobe nodules and multiple osteosclerotic lesions.  -MRI Brain 7/24/23:  Stable vague enhancement, mild increased T2 signal, and hemosiderin deposition posterior superior aspect right insula at site of prior treated metastasis. Stable 3 mm vestibular schwannoma fundus left IAC. No new enhancing lesions identified.  -CT C/A/P 8/18/23:  Stable exam. Unchanged 1.1 cm right apical nodule, few scattered bilateral sub-4 mm pulmonary nodules, and osteosclerotic lesions.  -MRI Brain 12/1/23:  No new areas of abnormal enhancement. Increase in size of the cortical focus of increased T2 FLAIR signal within the posterior aspect of the left frontal lobe at site of a prior treated metastasis. No associated abnormal enhancement. New 4 x 3 mm region of increased T2 signal posterior left frontal lobe near the vertex with associated decreased T1 signal and without associated enhancement. Attention to these areas on follow-up recommended.  -CT C/A/P 12/1/23:  Stable 1.2 cm right apical spiculated nodular density with stable additional less than 0.4 cm nodules. No new or enlarging pulmonary nodules or consolidations. Stable appearance of sclerotic lesions throughout the visualized osseous structures. No new evidence of metastatic disease. Abnormal appearing endometrium in a patient postmenopausal, recommend pelvic ultrasound.  -TV U/S 1/3/24:  Thickened, cystic endometrium measuring up to 1.0 cm. Gynecologic consultation for tissue sampling is recommended.  -Screening Mammo/Sono 1/25/24:  No mammographic or sonographic evidence of malignancy.  -CT C/A/P 4/2/24:  Interval disease stability within the chest, abdomen, and pelvis compared to 12/1/2023:  Right apical 1.3 cm irregular lesion and right lower lobe pulmonary micronodule unchanged. Sclerotic osseous lesions unchanged. Similar-appearing thickening of the endometrium.  -MRI Brain 5/2/24:   1. No acute intracranial abnormality. No findings suspicious for new intracranial metastases. 2. Unchanged left vestibular schwannoma. 3. Mild interval progression of overall very mild white matter disease which may be the sequelae of treatment effect, chronic microvascular ischemic disease, vasculitis, chronic migraines, and/or Lyme disease among other possibilities. 4. Nonspecific progression of an ovoid T1 hypointense focus in the medial left parietal lobe. Attention on follow-up exams is advised.  -CT CAP 8/10/24:  Stable exam, with unchanged 1.2 cm right apical nodule, punctate posterior right lower lobe nodule and osteosclerotic lesions. Unchanged indeterminate endometrial thickening. No new disease in the chest, abdomen or pelvis.  -MRI head w/wo con 8/15/24:  Minimal increase in size of multiple FLAIR hyperintense, nonenhancing lesions in the bilateral precentral gyri and right medial frontoparietal white matter. Spectroscopy evaluation of the left medial precentral gyrus lesion is nonspecific. Lesions that are amenable to perfusion evaluation demonstrate no hyperperfusion. Evolving posttreatment changes are considered but continued follow-up is recommended.  -PET/CT 11/22/24:   1. Since 6/21/2021 FDG PET/CT, no significant interval change of the spiculated 1.8 cm right upper lobe lung nodule demonstrating mild FDG activity. No new hypermetabolic foci in the thorax. 2. Interval increased size of mildly avid RIGHT sclerotic sacral lesion, previously was nonavid. Other are non-FDG avid sclerotic lesions compatible for treated bone metastases are stable. 3. Persistent mild nonspecific activity within bilateral 1B cervical lymph nodes, may be inflammatory. 4. Stable nonspecific mildly avid mediastinal and bilateral hilar lymph nodes. Resolution of previously noted right hilar lymph noted activity. 5. Stable likely physiologic activity of bilateral adrenal glands. 6. Previously noted mild gastric hypermetabolism not visualized.  Images reviewed/interpreted:  - MR brain w/wo contrast 2/15/25: Interval mild increase in 1.5 cm T2 FLAIR hyperintense, nonenhancing lesion in the left medial precentral gyrus compared with 8/15/2024. Additional T2 FLAIR hyperintense, nonenhancing lesions in the right medial frontoparietal white matter and the bilateral precentral gyri are not significantly changed compared to 8/15/2024. No new enhancing lesions identified.  - CT C/A/P with contrast 3/8/25: Enlarging right sacral osteosclerotic lesion (corresponding to the FDG avidity noted on recent PET/CT). Other osteosclerotic metastases appear grossly unchanged. Stable 1.6 cm right apical nodule and areas of mild interlobular septal thickening in the mid to lower lungs. Unchanged thickening of the adrenal glands. Unchanged endometrial thickening.

## 2025-03-13 NOTE — HISTORY OF PRESENT ILLNESS
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  She presented with symptomatic brain metastasis and was found to have a RUL primary tumor with intrathoracic LN, bone metastases, liver metastasis and brain metastasis.  Biopsy of the primary tumor in late Feb 2019 revealed her diagnosis.  She was treated with SRS to the brain in March 2019 with nice response.  She was started on first line Alectinib in April 2019 and achieved TX.    Patient is status post admission at [facility] from 11/27-12/4/2020 for treatment of disseminated histoplasmosis.  She was started on AmBisome with a plan to insert a PICC line however one of her blood cultures grew multiple organisms delaying the PICC line placement.  Subsequent fungal and bacterial cultures were negative.  During her hospital stay, she developed ANDREW which was felt to be secondary to the AmBisome however her alectinib was held during the hospital stay.  Abdominal ultrasound was unremarkable.  She was transfused 1 unit PRBCs prior to discharge.  She was discharged with plan to treat the histoplasmosis with oral posaconazole in the outpatient setting.  Patient has subsequently been on treatment with dose-reduced Alectinib 450mg BID since late December 2020 due to renal function.   She started Posaconazole in mid-March with the Alectinib.  Alectinib was stopped in late June 2021 due to poor tolerability with the combination.   Patient was started on Pemetrexed chemotherapy in July 2021 in effort to maintain control of her lung cancer while preserving sensitivity to ALK inhibitors and allowing her to be treated with Posaconazole for the disseminated histo.  She received 1 cycle of chemotherapy with Pemetrexed that was very poorly tolerated.  She completed roughly 1 year of Posaconazole in late April 2022.   She had repeat BM Bx in Sept 2021 showing evidence of persistent histoplasmosis and was continued on Posaconazole. Restaging PET/CT in early October with evidence of disease progression.  She restarted Alectinib 450mg BID on 10/9/21.   MRI Brain on 10/5/21 revealed two new small metastases; she had f/u with Rad-Onc and given her asymptomatic status coupled with restarting the Alectinib, the plan was to do a short-term f/u MRI at a 2 month interval in early December.    Developed return of hemolysis in November 2021.  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  Alectinib discontinued in mid-November 2021.   Started 2nd line Brigatinib in November 2021; achieved TX.  [facility] testing in July 2022 without any alterations.   Patient's case was reviewed at thoracic tumor board in December 2024: There is felt to be no clear role for the inclusion of consolidative thoracic RT at this juncture.  [de-identified] : -Lung, RUL CT-guided FNA 2/26/19:  Positive for malignant cells.  Adenocarcinoma.  PDL1 positive at 5%.  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  [de-identified] : Ms. Fields continues on second line systemic therapy with brigatinib targeting her EML4-ALK fusion since Nov 2021; achieved AR.   Reports adherence with brigatinib 180mg by mouth daily without missing doses and denies brigatinib side effects. Does report currently having loose stools up to 1-2x/day, not daily and is dependent on type of food eaten. She attributes this to metformin started recently. Reports chronic myalgias to the left shoulder/left upper back area, can travel to left side of neck to left occipital area; she feels this is increased when mentally stressed.  Uses salon-pas topical and sometimes takes tizanidine with benefit.  Reports history of left shoulder bursitis and that she found relief with cortisone injection at [facility] many years ago and states she may seek injection again.  She requests to change omeprazole prescription to pantoprazole.  She asked if she needs to continue folic acid supplement upon medication review.  Reports recent urinary urgency and believes she has a UTI and asks for abx.  Denies fever, flank pain, hematuria, foul smelling or cloudy urine.   Restaging brain MRI 2/15/25 reported interval mild increase in 1.5cm T2 FLAIR hyperintense, nonenhancing lesion in the left medial precentral gyrus compared to 8/15/24. No significant change to T2 FLAIR hyperintense, nonenhancing lesions right medial frontoparietal white matter and bilateral precentral gyri. No new enhancing lesions.  Followed up with Dr. Elkins and met with Dr. Alicia with plans for MRI brain w/wo contrast Quicktome protocol and MR spectroscopy and MR perfusion.  Restaging CT C/A/P 3/8/25 reported enlarging osteosclerotic lesion corresponding to FDG avidity on 11/22/24 PET CT. Other findings stable.

## 2025-03-13 NOTE — HISTORY OF PRESENT ILLNESS
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  She presented with symptomatic brain metastasis and was found to have a RUL primary tumor with intrathoracic LN, bone metastases, liver metastasis and brain metastasis.  Biopsy of the primary tumor in late Feb 2019 revealed her diagnosis.  She was treated with SRS to the brain in March 2019 with nice response.  She was started on first line Alectinib in April 2019 and achieved SC.    Patient is status post admission at [facility] from 11/27-12/4/2020 for treatment of disseminated histoplasmosis.  She was started on AmBisome with a plan to insert a PICC line however one of her blood cultures grew multiple organisms delaying the PICC line placement.  Subsequent fungal and bacterial cultures were negative.  During her hospital stay, she developed ANDREW which was felt to be secondary to the AmBisome however her alectinib was held during the hospital stay.  Abdominal ultrasound was unremarkable.  She was transfused 1 unit PRBCs prior to discharge.  She was discharged with plan to treat the histoplasmosis with oral posaconazole in the outpatient setting.  Patient has subsequently been on treatment with dose-reduced Alectinib 450mg BID since late December 2020 due to renal function.   She started Posaconazole in mid-March with the Alectinib.  Alectinib was stopped in late June 2021 due to poor tolerability with the combination.   Patient was started on Pemetrexed chemotherapy in July 2021 in effort to maintain control of her lung cancer while preserving sensitivity to ALK inhibitors and allowing her to be treated with Posaconazole for the disseminated histo.  She received 1 cycle of chemotherapy with Pemetrexed that was very poorly tolerated.  She completed roughly 1 year of Posaconazole in late April 2022.   She had repeat BM Bx in Sept 2021 showing evidence of persistent histoplasmosis and was continued on Posaconazole. Restaging PET/CT in early October with evidence of disease progression.  She restarted Alectinib 450mg BID on 10/9/21.   MRI Brain on 10/5/21 revealed two new small metastases; she had f/u with Rad-Onc and given her asymptomatic status coupled with restarting the Alectinib, the plan was to do a short-term f/u MRI at a 2 month interval in early December.    Developed return of hemolysis in November 2021.  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  Alectinib discontinued in mid-November 2021.   Started 2nd line Brigatinib in November 2021; achieved SC.  [facility] testing in July 2022 without any alterations.   Patient's case was reviewed at thoracic tumor board in December 2024: There is felt to be no clear role for the inclusion of consolidative thoracic RT at this juncture.  [de-identified] : -Lung, RUL CT-guided FNA 2/26/19:  Positive for malignant cells.  Adenocarcinoma.  PDL1 positive at 5%.  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  [de-identified] : Ms. Fields continues on second line systemic therapy with brigatinib targeting her EML4-ALK fusion since Nov 2021; achieved RI.   Reports adherence with brigatinib 180mg by mouth daily without missing doses and denies brigatinib side effects. Does report currently having loose stools up to 1-2x/day, not daily and is dependent on type of food eaten. She attributes this to metformin started recently. Reports chronic myalgias to the left shoulder/left upper back area, can travel to left side of neck to left occipital area; she feels this is increased when mentally stressed.  Uses salon-pas topical and sometimes takes tizanidine with benefit.  Reports history of left shoulder bursitis and that she found relief with cortisone injection at [facility] many years ago and states she may seek injection again.  She requests to change omeprazole prescription to pantoprazole.  She asked if she needs to continue folic acid supplement upon medication review.  Reports recent urinary urgency and believes she has a UTI and asks for abx.  Denies fever, flank pain, hematuria, foul smelling or cloudy urine.   Restaging brain MRI 2/15/25 reported interval mild increase in 1.5cm T2 FLAIR hyperintense, nonenhancing lesion in the left medial precentral gyrus compared to 8/15/24. No significant change to T2 FLAIR hyperintense, nonenhancing lesions right medial frontoparietal white matter and bilateral precentral gyri. No new enhancing lesions.  Followed up with Dr. Elkins and met with Dr. Alicia with plans for MRI brain w/wo contrast Quicktome protocol and MR spectroscopy and MR perfusion.  Restaging CT C/A/P 3/8/25 reported enlarging osteosclerotic lesion corresponding to FDG avidity on 11/22/24 PET CT. Other findings stable.

## 2025-03-13 NOTE — ASSESSMENT
[Medication(s)] : Medication(s) [FreeTextEntry1] : Never-smoking woman with metastatic NSCLC with adenocarcinoma histology with ALK rearrangement. She was treated with gamma knife RT to brain metastasis in March 2019. She started systemic therapy with Alectinib in April 2019 and achieved MA. Alectinib dose-reduced in late December 2020 due to renal function decline possibly due to anti-fungal agents for disseminated histoplasmosis. Started treatment with oral Posaconazole for histoplasmosis in mid-March 2021. Alectinib has been poorly tolerated in combination with Posaconazole, requiring dose-reductions of Alectinib. She was intolerant of the combination. Patient with sustained response on restaging PET/CT in June 2021. Alectinib was discontinued in late June 2021 due to poor tolerability in combination with posaconazole for disseminated histoplasmosis. Treated with 1 cycle of Pemetrexed chemotherapy on 7/9/21 with very poor tolerability. Further systemic therapy for lung cancer was held from July-October 2021. Restaging PET/CT October 2021 with disease progression. Restarted Alectinib 450mg BID on 10/9/21. She increased Alectinib to full dosing in late October 2021. Developed return of hemolysis in November and Alectinib permanently discontinued on 11/15/21 due to impression of drug-induced hemolytic anemia associated with this agent. Hemolysis resolved with discontinuation of offending agent (Alectinib). [facility] ct-DNA testing 11/19/21 with no evidence of ALK resistance mutations. Started 2nd line Brigatinib in late November 2021; achieved MA. Restaging brain MRI 2/15/25 with mild increase in left medial precentral gyrus lesion - further imaging planned with Dr. Elkins and Dr. Alicia. Restaging CT C/A/P 3/8/25 showed increased size of osteosclerotic sacral lesion corresponding to area of increased FDG avidity on Nov 2024 scan.  Recommend: -Continue treatment with 2nd line Brigatinib targeting her ALK rearrangement for as long as it benefits the patient, currently at 180mg PO daily. -Repeat labs today. Can monitor CEA; not clear if informative. Monitor CPK, amylase/lipase while on brigatinib.  No clear role/benefit for monitoring other tumor markers.   -Discuss RT to oligoprogression to sacrum with Dr. Hart or Dr. Elkins.  Will sort out.  -Anemia:  monitor hemoglobin and periodic iron studies  -Can change omeprazole to pantoprazole per pt request, prescription sent -Can discontinue folic acid; patient previously on this when having hemolysis  -Dysuria. Can treat with Bactrim DS x 3 days. UA/UC sent.  -Questionable Histoplasmosis in BM s/p 1 year of antifungal therapy completed April 2022. Being monitored off antifungal; BM results possibly artifactual. Previously followed with ID. -F/U with Neuro-Oncologist. Titrated off Briviact. -F/U with Rad-Onc Dr. Elkins for management of Brain metastases. Recently saw Dr. Alicia due to Feb 2025 MRI findings. Planned for additional MRI testing including MRI brain w/wo contrast Quicktome protocol, and MR spectroscopy and MR perfusion, which pt states is limited to [facility] location and is awaiting rescheduling.  -Given the previous deep response in bone metastases, avoided the addition of a bone modifying agent - Information provided for PMR for myalgias and hx left shoulder bursitis.  - Follow-up in early May - Check out form given to patient with instructions for making appointments

## 2025-03-17 NOTE — VITALS
[Maximal Pain Intensity: 4/10] : 4/10 [Pain Location: ___] : Pain Location: [unfilled] [90: Able to carry normal activity; minor signs or symptoms of disease.] : 90: Able to carry normal activity; minor signs or symptoms of disease.  [Least Pain Intensity: 0/10] : 0/10 [Pain Duration: ___] : Pain duration: [unfilled] [OTC] : OTC

## 2025-03-17 NOTE — VITALS
no [Maximal Pain Intensity: 4/10] : 4/10 [Pain Location: ___] : Pain Location: [unfilled] [90: Able to carry normal activity; minor signs or symptoms of disease.] : 90: Able to carry normal activity; minor signs or symptoms of disease.  [Least Pain Intensity: 0/10] : 0/10 [Pain Duration: ___] : Pain duration: [unfilled] [OTC] : OTC

## 2025-03-18 NOTE — REVIEW OF SYSTEMS
[Negative] : Neurological [Headache: Grade 0] : Headache: Grade 0 [Peripheral Sensory Neuropathy: Grade 0] : Peripheral Sensory Neuropathy: Grade 0 [Dizziness: Grade 0] : Dizziness: Grade 0  [Shortness Of Breath] : no shortness of breath [Confused] : no confusion [Dizziness] : no dizziness [Fainting] : no fainting [Difficulty Walking] : no difficulty walking [FreeTextEntry3] : right eye blurry vision attributes to cataract (had left cataract sx) [de-identified] : Denies HA/gait disturbance [de-identified] : RLLAVELLE

## 2025-03-18 NOTE — PHYSICAL EXAM
[] : no respiratory distress [Oriented To Time, Place, And Person] : oriented to person, place, and time [General Appearance - In No Acute Distress] : in no acute distress [Affect] : the affect was normal [de-identified] : EXAM LIMITED VISIT TELEHEALTH

## 2025-03-18 NOTE — REASON FOR VISIT
[Routine Follow-Up] : routine follow-up visit for [Brain Metastasis] : brain metastasis [Home] : at home, [unfilled] , at the time of the visit. [Other Location: e.g. Home (Enter Location, City,State)___] : at [unfilled] [Telehealth (audio & video)] : This visit was provided via telehealth using real-time 2-way audio visual technology. [Verbal consent obtained from patient] : the patient, [unfilled] [FreeTextEntry4] : Roseline Sanchez

## 2025-03-18 NOTE — HISTORY OF PRESENT ILLNESS
[Home] : at home, [unfilled] , at the time of the visit. [Medical Office: ([facility])___] : at the medical office located in  [Telehealth (audio & video)] : This visit was provided via telehealth using real-time 2-way audio visual technology. [Verbal consent obtained from patient] : the patient, [unfilled] [FreeTextEntry1] : Ms Fields has a h/o metastatic NSCLC with ALK rearrangement who presented with a single brain metastasis measuring 1.4 cm in size in the right inferior frontal gyrus. She completed gamma knife radiation therapy for a total of 2000 cGy to the right parietal area on 3/13/19.  ONCOLOGY HISTORY She presented to me as a 49 year old female with PMH of rheumatoid arthritis, who presented to [facility] on 2/20 from OSH with complaints of left sided coolness. She had previously had imaging outside which revealed RUL spiculated mass, sclerotic lesion in T11, as well as 1.4 cm enhancing nodule in insula cortex with vasogenic edema and midline shift. MRI brain from 2/25/19 showed Right inferior frontal gyrus nodule consistent with a primary versus secondary neoplasm, 1.4 X 1.4 X 1.3 cm.  Punctate nodular enhancement along the distal left seventh eighth nerve complex may represent a tiny vestibular schwannoma or leptomeningeal disease. Plan made for SRS.  Pathology from right upper lobe CT guided FNA from 2/26/19 revealed adenocarcinoma, PDL1+ at 5%. She received GK in March of 2019.  She was found to have an ALK mutation and started on Alcensa.    10/2/19- Ms. Fields presents today for follow up. She underwent a brain MRI on 10/1/19 which showed no new disease. Today she is feeling well. She has some bradycardia. Denies headaches, focal weakness, numbness, tingling, nausea, vomiting. Recently back to work. Will have PET scan this coming Friday.  12/4/19- Ms. Fields presents today for follow up. She underwent a brain MRI as an inpatient on 11/27/19 which showed Enhancing lesion in the right frontal operculum just medial to the sylvian fissure is larger when compared to 10/1/2019 but is significantly smaller compared with 3/13/2019 and may represent neoplasm versus post therapy changes. No change tiny enhancing nodule left internal auditory canal. 4.5 mm in AP diameter x 6.2 mm transversely.She notably presented to [facility] on 11/27/19 with 3 episodes of left face, hand, and leg. SHe was discharged to follow up with us. She follows with . Will be seeing Dr. Salinas this week to establish care. Today she denies headaches, focal weakness, nausea, vomiting. She is on Keppra 500 BID. Not on any steroids. Will see Dr. Garcia. She has not had any further feelings of tingling to her left face and left arm. Continues on Alectinib, was dose reduced for a bit of time due to bradycardia.  Denies trouble with balance. Intermittently has a slight sensation, indescribable, to the left side of her face around her mouth, which she has previously experienced when starting Alectinib.   1/15/20- Ms. Fields presents today for follow up. She underwent an MRI with spectroscopy and perfusion this afternoon. This  CT CAP 1/7/20 showed Spiculated appearing mass in the right upper lobe, consistent with patient's  known lung cancer. Scattered sclerotic osseous lesions may represent metastatic disease. These are likely progressed from prior.  Today she is feeling well. Continues on Alectinib. Denies headaches, confusion, nausea, vomiting, No further seizures. Now on briviact for seizure management. Now following with neuro oncologist Dr. Karis Reyes.   MRI interpreted as:  Impression: Enhancement in the right basal ganglia frontal operculum larger compared with 11/27/2019 is suspicious for progression of neoplasm. MR spectroscopy does not demonstrate increased choline to creatine, against tumor recurrence. MR perfusion demonstrates decreased blood pool suggesting post therapy change. Recommend close clinical follow-up.  03/5/2020- Ms. Fields presents today for follow up. She recently was found to have increased LFTs and a new anemia with HGB 6.7. Alectinib was held. MRI brain from 2/27/2020 showed a similar appearing enhancing right subinsular and right frontal opercular lesion. Decreased edema in the larger right subinsular lesion.   PET scan 1/31/2020 showed mildly FDG avid RUL lung mass was decreased in size and metabolism when compared to 10/4/2020. There was resolution of a small mildly FDG avid right paratracheal node. Non FDG avid sclerotic lesions are compatible with treated bone mets. Today she denies headaches, focal weakness, numbness, tingling, nausea vomiting. No seizures. Last dose of steroids was this morning. Notes swelling in her legs has returned recently. Feeling extremely cold and tired.   05/28/20- Ms. Fields is seen today via Telehealth platform in follow up. Verbal consent obtained. PET/CT 05/04/20 noted mildly FDG-avid spiculated right upper lobe lung mass is unchanged on CT, and minimally increased in metabolism as compared to prior study dated 1/31/2020. Etiology is indeterminate. Small focus of residual disease is not excluded. New small hypermetabolic right perihilar and right paratracheal lymph nodes are nonspecific.  Non- FDG-avid sclerotic lesions, unchanged, compatible with treated bone metastases. She saw Dr. Humphreys on 03/20/20 with plans for bone marrow biopsy for her hemolytic anemia. She was discontinued on Alectinib due to anemia and was restarted again by Dr. Silveira. She continues to take  Briviact for seizure.  She saw Dr. Salinas on 05/08/20 with follow up plans in June.MRI brain 5/27/2020 showed Unchanged size of enhancing lesion in the right subinsular region with increased surrounding vasogeni c edema. Continued close interval follow-up is recommended. Today she feels fine. Denies headaches, nausea,  dizziness, vision or auditory changes, no weakness, numbness or tingling. On Alectinib.   8/4/20 She presents for follow up 7/30/20 MRI showed abnormal enhancing lesion with surrounding edema is again identified though decreased size and surrounding edema seen when compared prior exam.  Today she feels mostly well. Denies headaches, nausea, vomiting. Has not had any further tingling or tickling sensations on her face. Recently stopped her antifungals as they caused her SOB and edema, and this has helped. Continues on alectinib. Bone marrow biopsy for her anemia found to be consistent with histoplasmosis.    11/4/20: She presents for f/u with most recent MRI from 10/30/20 showing  decreasing enhancement and surrounding FLAIR signal abnormality along the inferior right frontal lobe consistent with response to treatment. No new lesions are identified.  3/4/2021- Ms. Fields presents today for follow up.  She continues to follow with Dr. Salinas. Continues on alectinib dose reduced after ANDREW treatment in 12/2020. Has not yet started posaconazole for histoplasmosis.   MR brain 2/26/2021 showed Small focus of enhancement in the right frontal lobe without significant interval change from the most recent exam compatible with a treated metastasis. 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  PET scan 2/12/2021 showed  1. Mildly FDG-avid spiculated right upper lobe nodule is unchanged in size and metabolism, compatible with treated disease. 2. Few new small FDG-avid right axillary lymph nodes are nonspecific, probably reactive. Further evaluation may be performed with ultrasound. Nonspecific small FDG-avid mediastinal and bilateral hilar lymph nodes, unchanged in number and avidity. Right paratracheal node demonstrates new calcification. 3. Probable physiologic FDG activity, bilateral adrenal glands, unchanged. 4. Mild FDG activity associated with fractures in right 11th and 12th ribs, decreased as compared to prior study. Few non-FDG-avid sclerotic lesions, unchanged, compatible with treated bone metastases.  Today she feels very well. Had a headache after her cataract surgery, but none since. No seizures, no focal weakness.  7/15/2021- Ms. Fields presents today for follow up. She was admitted 6/29-7/1 with left arm, left face, and left chest discomfort. EEG normal. MRI head 7/1/2021 showed  1.  No new lesions are identified. 2.  Stable appearance of a previously treated metastatic lesion as described. 3.  Stable 3 mm enhancing lesion in the left internal auditory canal which likely represents a vestibular schwannoma.  Alectinib held upon discharge. discharged on briviact 25mg in AM, 50mg in PM.  She started pemtrexed on 7/9.  Notably PET scan on 6/21/2021 showed  1. Mildly FDG avid spiculated right upper lobe nodule is unchanged in size and metabolism, compatible with treated disease. 2. Nonspecific new mild FDG avidity of bilateral level Ib cervical lymph nodes which have minimally increased in size. Please correlate clinically and with ultrasound for further evaluation as indicated. 3. Interval decrease in size and FDG avidity of nonspecific right axillary lymph nodes. Nonspecific small FDG avid mediastinal and bilateral hilar lymph nodes, unchanged in number and avidity. 4. Probable physiologic FDG activity, bilateral adrenal glands, not significantly changed. 5. Interval further decreased FDG activity associated with fractures in the right 11th-12th ribs. A few non-FDG avid sclerotic lesions, unchanged, compatible with treated bone metastasis. 6. Unchanged mild gastric hypermetabolism. Please correlate clinically for gastritis.  Today she is frustrated regarding recent tightness to her left arm, face, chest, and jaw. She notes this has been worse lately since stopping alectinib and continuing on posaconazole with her new chemotherapy. She does not have this symptom when she does not take posaconazle.   10/14/2021 - Patient presents for followup. Interval MRI on 10/05 shows 2 small enhancing lesions which are new since the prior exam of 7/1/2021 suspicious for metastasis. A 5 mm focus is in the right frontal lobe along the motor strip and a 3 mm focus is in the left frontal lobe in the subcortical white matter of the precentral gyrus.  Recent bone marrow biopsy showed histoplasmosis as well as AdenoCa. Recent body imaging PET from 10/05 showing new or progressive disease at RUL, mediastinum and pelvic bones.  She has had difficulty with tolerating her alectinib and has been on varying doses over the previous year most recently having held it all together until this past week.    Restarted Alectinib last Saturday due to POD.   12/09/2021:  Patient reports today for regular follow up.  Her last MRI was prior to her 10/14 appt, which showed the presence of a 5 mm focus in the right frontal lobe along the motor strip and a 3 mm focus in the left frontal lobe in the subcortical white matter of the precentral gyrus. MRI brain 12/5/21 as per our read shows resolution of these lesions.  Saw Dr. Salinas 11/19/21, with Alectinib discontinued d/t concern of hemolytic anemia. Of note, on 12/7/21  recently presented to [facility] ED with hemoptysis, and CTA done negative for PE. Today she reports feeling well. Denies dizziness, nausea, vomiting. Continues to take poscanozole and alunbrig which she feels is elevating her blood pressure and increasing pressure in the head.   2/17/2022: Pt presents for follow-up. Reports occasional dizziness, fatigue, and lightheadedness, but unsure if due to changes in BP medication. Notes the adjustment in medications has helped HA (ongoing for some months). Occasionally has blurred vision when trying to read and write. Continues on Brigatinib, and Posaconazole (histoplasmosis)  5/18/2022- Ms. Fields presents today for follow up.  Brain MRI done 5/13/2022 showed 1. No evidence of recurrent metastatic disease. 2. A punctate enhancing focus and associated susceptibility artifact in the ventral posterior aspect of the right frontal lobe, compatible with sequela of treated metastasis, is stable. 3. 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  Continues on brigatinib.  Notes some pain to crown of head extending from bilateral shoulders over the past few months. shoulders get sore at the end of the day.  8/25/2022- Ms. Fields presents today for follow up. continues to follow with Dr. Salinas. Completed posiconazole in 4/2022. Continues on brigatinib.  CT CAP 7/22/2022 showed New patchy groundglass opacity in the right lower lobe, likely infectious/inflammatory. Enlarging indeterminate mediastinal and hilar lymphadenopathy. Follow-up chest CT in 3 months or as per clinical protocol. Stable spiculated right apical nodule and osteosclerotic metastases. No new disease in the abdomen or pelvis.  [facility] MRI 8/22/2022 showed No evidence of intracranial metastatic disease. Resolution of previously seen precentral gyrus subcentimeter enhancing metastases. A focal area of T2 hyperintensity is seen in the region of the previously seen left-sided enhancing lesion likely representing the sequelae of prior radiation therapy. Continued follow-up is advised. Stable left internal auditory canal vestibular schwannoma. Stable area of enhancement with associated susceptibility artifact within the posterior right frontal lobe compatible with sequela of treated metastasis.  feeling very well overall no headaches, no nausea, no focal weakness, no numbness or tingling.  11/30/2022- Ms. Fields presents today for follow up. MRI brain done 11/23/2022 showed * STABLE VAGUE ENHANCEMENT, MILD INCREASED T2 SIGNAL, AND HEMOSIDERIN DEPOSITION POSTERIOR SUPERIOR ASPECT RIGHT INSULA AT SITE OF PRIOR TREATED METASTASIS. 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  * NO NEW ENHANCING LESIONS IDENTIFIED.  Continues to follow with Dr. Salinas. continues on brigatinib.  CT CAP 10/21/2022 showed Since 7/22/2022.  Stable right upper lobe spiculated right upper lobe nodule.  Stable mildly enlarged hilar lymph nodes (although increased compared to 4/22/2022).  Stable sclerotic bone lesions. No finding to suggest new or progressive disease.  Overall feeling well. no headaches, no nausea, no focal weakness, no numbness or tingling. sometimes gets muscle pains.   3/30/2023- Ms. Fields presents today for follow up. SHe has contineud to follow with Dr. Salinas. continues on second line brigatinib. Brain MRI and total spine MRI done at request of neuro oncologist done 3/23/2023.   MRI brain 3/23/2023 showed  Similar minimal curvilinear enhancement along the posterior insula ([id number]). Similar minimal associated T2 FLAIR signal abnormality. Minimally decreased size of previously seen focus of enhancement in the distal left IAC, currently 2.5 mm, previously 3 mm No new abnormal parenchymal or leptomeningeal enhancement.  MRI total spine 3/23/2023 showed No findings of osseous malignancy. No abnormal cord enhancement or cord signal abnormality.  CT CAP 1/20/2023 showed  Ill-defined nodular opacity in the right upper lobe is unchanged when compared to previous exam. No significant change in the sclerotic focus in the T11 vertebral body when compared to previous exam.  feels well today. no headaches, no nausea, no focal weakness. no longer having back pain.   Visit dated 8/2/2023 Patient returns for routine f/u with images for review. Reports doing well with no new neurological deficits to offer. Continues to follow with Dr. Salinas on 2nd line Brigatinib  MRI brain w w/o contrast 7/24/2023 IMPRESSION: Stable vague enhancement, mild increased T2 signal, and hemosiderin deposition posterior superior aspect right insula at site of prior treated metastasis. Stable 3 mm vestibular schwannoma fundus left IAC. No new enhancing lesions identified.  CT C/A/P 5/5/2023 IMPRESSION: Stable exam. No new disease in the chest, abdomen or pelvis. Unchanged spiculated right apical nodule, sub-4 mm left upper lobe nodules and multiple osteosclerotic lesions.  Visit dated 12/7/2023 Patient returns for routine follow up to include progress check and review of completed cranial images. Denies N/V, HA/unilateral extremity weakness/memory changes/gait disturbance/bowel/bladder dysfunction or other neurologic symptoms. No issues with speech or comprehension. Denies seizures  Continues to follow with Dr. Salinas on 2nd line Brigatinib targeting her ALK rearrangement for as long as it benefits the patient, currently at 180mg PO (per heme/onc notes)  MRI brain w w/o contrast 12/1/2023 IMPRESSION: No new areas of abnormal enhancement. Increase in size of the cortical focus of increased T2 FLAIR signal within the posterior aspect of the left frontal lobe at site of a prior treated metastasis. No associated abnormal enhancement. New 4 x 3 mm region of increased T2 signal posterior left frontal lobe near the vertex with associated decreased T1 signal and without associated enhancement. Attention to these areas on follow-up recommended.   CT C/A/P 12/1/2023  final read not available at time of this entry  VISIT dated 5/8/2024 Patient presents for routine f/u and progress check with cranial images for review. Reports doing well overall w/o any new neurological findings. Baseline muscle aches remain persistent but doesn't limit participation in activity attributes to current treatment regimen. Denies N/V, HA/unilateral extremity weakness/memory changes/gait disturbance/bowel/bladder dysfunction or other neurologic symptoms. No issues with speech or comprehension.  Sees neurologist as well.  Continues to follow with Dr. Salinas on 2nd line Brigatinib targeting her ALK rearrangement since late November 2021.  CT C/A/P 4/2/2024 IMPRESSION: Interval disease stability within the chest, abdomen, and pelvis compared to 12/1/2023: Right apical 1.3 cm irregular lesion and right lower lobe pulmonary micronodule unchanged. Sclerotic osseous lesions unchanged. Similar-appearing thickening of the endometrium.  MRI brain w w/o contrast 5/2/2024 IMPRESSION: 1. No acute intracranial abnormality. No findings suspicious for new intracranial metastases. 2. Unchanged left vestibular schwannoma. 3. Mild interval progression of overall very mild white matter disease which may be the sequelae of treatment effect, chronic microvascular ischemic disease, vasculitis, chronic migraines, and/or Lyme disease among other possibilities. 4. Nonspecific progression of an ovoid T1 hypointense focus in the medial left parietal lobe. Attention on follow-up exams is advised.   VISIT DATED: 8/29/2024 Patient returns for progress check and evaluation. Cranial images have been completed as recommended to further assess and r/o POD which was of concern on MRI in May 2024 Continues to follow with Dr. Salinas on 2nd line Brigatinib 180mg daily targeting her ALK rearrangement since late November 2021. Patient denies headache, dizziness, nausea or vomiting, vision changes and has stable gait. She follow up with Dr. Bernard on 8/16/24 who informed her that there is no changes and everything is stable. MRI on 8/15/24, awaiting final report.  CT C/A/P w w/o contrast 8/10/2024 IMPRESSION: Stable exam, with unchanged 1.2 cm right apical nodule, punctate posterior right lower lobe nodule and osteosclerotic lesions. Unchanged indeterminate endometrial thickening. No new disease in the chest, abdomen or pelvis.  MRI brain with SPECT 8/15/2024 final read pending  VISIT DATED: 2/26/2025 Ms. Fields presents for routine f/u and progress check with cranial images for review. States doing well overall but is a little upset today after a misunderstanding with her spouse. Notes mild shoulder discomfort which varies in intensity with temperature changes, but she has not need to utilize medications. Denies N/V, HA/RIGHT side weakness/memory changes/gait disturbance/bowel/bladder dysfunction or other neurologic symptoms. No issues with speech or comprehension.  Follows with neuro/onc @ [place] Continues to follow with Dr. Salinas on 2nd line Brigatinib targeting her ALK rearrangement since late November 2021. Last PETct 11/2024 with Interval increased size of mildly avid RIGHT sclerotic sacral lesion, previously was nonavid. Other are non-FDG avid sclerotic lesions compatible for treated bone metastases are stable. Otherwise, stable findings. Plans for CT C/A/P scheduled for 3/8/2025.   MRI brain w w/o contrast 2/15/2025 IMPRESSION: Interval mild increase in 1.5 cm T2 FLAIR hyperintense, non-enhancing lesion in the left medial precentral gyrus compared with 8/15/2024. Additional T2 FLAIR hyperintense, nonenhancing lesions in the right medial frontoparietal white matter and the bilateral precentral gyri are not significantly changed compared to 8/15/2024 No new enhancing lesions identified.  CT C/A/P 3/8/25 IMPRESSION: Enlarging right sacral osteosclerotic lesion (corresponding to the FDG avidity noted on recent PET/CT). Other osteosclerotic metastases appear grossly unchanged. Stable 1.6 cm right apical nodule and areas of mild interlobular septal thickening in the mid to lower lungs. Unchanged thickening of the adrenal glands. Unchanged endometrial thickening.   3/17/25: Presents for follow-up visit to discuss enlarging right sacral osteosclerotic lesion.  She saw Dr. Alicia for discussion of biopsy vs resxn of lesion, was ordered for MRI with Quicktome protocol with spectroscopy and perfusion scheduled for 3/22/2025.  Continues to follow with Dr. Salinas on 2nd line Brigatinib targeting her ALK rearrangement since late November 2021, tolerating.  She reports feeling well, no headache, no n/v, no diplopia, no neuro symptoms. She also denies back pain other than the chronic left upper back/neck muscle tension. No leg weakness, no numbness/tingling. She has no sacral pain, numbness or weakness in her lower extremities.

## 2025-03-18 NOTE — PHYSICAL EXAM
[] : no respiratory distress [Oriented To Time, Place, And Person] : oriented to person, place, and time [General Appearance - In No Acute Distress] : in no acute distress [Affect] : the affect was normal [de-identified] : EXAM LIMITED VISIT TELEHEALTH

## 2025-03-18 NOTE — REVIEW OF SYSTEMS
[Negative] : Neurological [Headache: Grade 0] : Headache: Grade 0 [Peripheral Sensory Neuropathy: Grade 0] : Peripheral Sensory Neuropathy: Grade 0 [Dizziness: Grade 0] : Dizziness: Grade 0  [Shortness Of Breath] : no shortness of breath [Confused] : no confusion [Dizziness] : no dizziness [Fainting] : no fainting [Difficulty Walking] : no difficulty walking [FreeTextEntry3] : right eye blurry vision attributes to cataract (had left cataract sx) [de-identified] : Denies HA/gait disturbance [de-identified] : RLLAVELLE

## 2025-03-18 NOTE — HISTORY OF PRESENT ILLNESS
[Home] : at home, [unfilled] , at the time of the visit. [Medical Office: ([facility])___] : at the medical office located in  [Telehealth (audio & video)] : This visit was provided via telehealth using real-time 2-way audio visual technology. [Verbal consent obtained from patient] : the patient, [unfilled] [FreeTextEntry1] : Ms Fields has a h/o metastatic NSCLC with ALK rearrangement who presented with a single brain metastasis measuring 1.4 cm in size in the right inferior frontal gyrus. She completed gamma knife radiation therapy for a total of 2000 cGy to the right parietal area on 3/13/19.  ONCOLOGY HISTORY She presented to me as a 49 year old female with PMH of rheumatoid arthritis, who presented to [place] on 2/20 from OSH with complaints of left sided coolness. She had previously had imaging outside which revealed RUL spiculated mass, sclerotic lesion in T11, as well as 1.4 cm enhancing nodule in insula cortex with vasogenic edema and midline shift. MRI brain from 2/25/19 showed Right inferior frontal gyrus nodule consistent with a primary versus secondary neoplasm, 1.4 X 1.4 X 1.3 cm.  Punctate nodular enhancement along the distal left seventh eighth nerve complex may represent a tiny vestibular schwannoma or leptomeningeal disease. Plan made for SRS.  Pathology from right upper lobe CT guided FNA from 2/26/19 revealed adenocarcinoma, PDL1+ at 5%. She received GK in March of 2019.  She was found to have an ALK mutation and started on Alcensa.    10/2/19- Ms. Fields presents today for follow up. She underwent a brain MRI on 10/1/19 which showed no new disease. Today she is feeling well. She has some bradycardia. Denies headaches, focal weakness, numbness, tingling, nausea, vomiting. Recently back to work. Will have PET scan this coming Friday.  12/4/19- Ms. Fields presents today for follow up. She underwent a brain MRI as an inpatient on 11/27/19 which showed Enhancing lesion in the right frontal operculum just medial to the sylvian fissure is larger when compared to 10/1/2019 but is significantly smaller compared with 3/13/2019 and may represent neoplasm versus post therapy changes. No change tiny enhancing nodule left internal auditory canal. 4.5 mm in AP diameter x 6.2 mm transversely.She notably presented to [facility] on 11/27/19 with 3 episodes of left face, hand, and leg. SHe was discharged to follow up with us. She follows with . Will be seeing Dr. Salinas this week to establish care. Today she denies headaches, focal weakness, nausea, vomiting. She is on Keppra 500 BID. Not on any steroids. Will see Dr. Garcia. She has not had any further feelings of tingling to her left face and left arm. Continues on Alectinib, was dose reduced for a bit of time due to bradycardia.  Denies trouble with balance. Intermittently has a slight sensation, indescribable, to the left side of her face around her mouth, which she has previously experienced when starting Alectinib.   1/15/20- Ms. Fields presents today for follow up. She underwent an MRI with spectroscopy and perfusion this afternoon. This  CT CAP 1/7/20 showed Spiculated appearing mass in the right upper lobe, consistent with patient's  known lung cancer. Scattered sclerotic osseous lesions may represent metastatic disease. These are likely progressed from prior.  Today she is feeling well. Continues on Alectinib. Denies headaches, confusion, nausea, vomiting, No further seizures. Now on briviact for seizure management. Now following with neuro oncologist Dr. Karis Reyes.   MRI interpreted as:  Impression: Enhancement in the right basal ganglia frontal operculum larger compared with 11/27/2019 is suspicious for progression of neoplasm. MR spectroscopy does not demonstrate increased choline to creatine, against tumor recurrence. MR perfusion demonstrates decreased blood pool suggesting post therapy change. Recommend close clinical follow-up.  03/5/2020- Ms. Fields presents today for follow up. She recently was found to have increased LFTs and a new anemia with HGB 6.7. Alectinib was held. MRI brain from 2/27/2020 showed a similar appearing enhancing right subinsular and right frontal opercular lesion. Decreased edema in the larger right subinsular lesion.   PET scan 1/31/2020 showed mildly FDG avid RUL lung mass was decreased in size and metabolism when compared to 10/4/2020. There was resolution of a small mildly FDG avid right paratracheal node. Non FDG avid sclerotic lesions are compatible with treated bone mets. Today she denies headaches, focal weakness, numbness, tingling, nausea vomiting. No seizures. Last dose of steroids was this morning. Notes swelling in her legs has returned recently. Feeling extremely cold and tired.   05/28/20- Ms. Fields is seen today via Telehealth platform in follow up. Verbal consent obtained. PET/CT 05/04/20 noted mildly FDG-avid spiculated right upper lobe lung mass is unchanged on CT, and minimally increased in metabolism as compared to prior study dated 1/31/2020. Etiology is indeterminate. Small focus of residual disease is not excluded. New small hypermetabolic right perihilar and right paratracheal lymph nodes are nonspecific.  Non- FDG-avid sclerotic lesions, unchanged, compatible with treated bone metastases. She saw Dr. Humphreys on 03/20/20 with plans for bone marrow biopsy for her hemolytic anemia. She was discontinued on Alectinib due to anemia and was restarted again by Dr. Silveira. She continues to take  Briviact for seizure.  She saw Dr. Salinas on 05/08/20 with follow up plans in June.MRI brain 5/27/2020 showed Unchanged size of enhancing lesion in the right subinsular region with increased surrounding vasogeni c edema. Continued close interval follow-up is recommended. Today she feels fine. Denies headaches, nausea,  dizziness, vision or auditory changes, no weakness, numbness or tingling. On Alectinib.   8/4/20 She presents for follow up 7/30/20 MRI showed abnormal enhancing lesion with surrounding edema is again identified though decreased size and surrounding edema seen when compared prior exam.  Today she feels mostly well. Denies headaches, nausea, vomiting. Has not had any further tingling or tickling sensations on her face. Recently stopped her antifungals as they caused her SOB and edema, and this has helped. Continues on alectinib. Bone marrow biopsy for her anemia found to be consistent with histoplasmosis.    11/4/20: She presents for f/u with most recent MRI from 10/30/20 showing  decreasing enhancement and surrounding FLAIR signal abnormality along the inferior right frontal lobe consistent with response to treatment. No new lesions are identified.  3/4/2021- Ms. Fields presents today for follow up.  She continues to follow with Dr. Salinas. Continues on alectinib dose reduced after ANDREW treatment in 12/2020. Has not yet started posaconazole for histoplasmosis.   MR brain 2/26/2021 showed Small focus of enhancement in the right frontal lobe without significant interval change from the most recent exam compatible with a treated metastasis. 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  PET scan 2/12/2021 showed  1. Mildly FDG-avid spiculated right upper lobe nodule is unchanged in size and metabolism, compatible with treated disease. 2. Few new small FDG-avid right axillary lymph nodes are nonspecific, probably reactive. Further evaluation may be performed with ultrasound. Nonspecific small FDG-avid mediastinal and bilateral hilar lymph nodes, unchanged in number and avidity. Right paratracheal node demonstrates new calcification. 3. Probable physiologic FDG activity, bilateral adrenal glands, unchanged. 4. Mild FDG activity associated with fractures in right 11th and 12th ribs, decreased as compared to prior study. Few non-FDG-avid sclerotic lesions, unchanged, compatible with treated bone metastases.  Today she feels very well. Had a headache after her cataract surgery, but none since. No seizures, no focal weakness.  7/15/2021- Ms. Fields presents today for follow up. She was admitted 6/29-7/1 with left arm, left face, and left chest discomfort. EEG normal. MRI head 7/1/2021 showed  1.  No new lesions are identified. 2.  Stable appearance of a previously treated metastatic lesion as described. 3.  Stable 3 mm enhancing lesion in the left internal auditory canal which likely represents a vestibular schwannoma.  Alectinib held upon discharge. discharged on briviact 25mg in AM, 50mg in PM.  She started pemtrexed on 7/9.  Notably PET scan on 6/21/2021 showed  1. Mildly FDG avid spiculated right upper lobe nodule is unchanged in size and metabolism, compatible with treated disease. 2. Nonspecific new mild FDG avidity of bilateral level Ib cervical lymph nodes which have minimally increased in size. Please correlate clinically and with ultrasound for further evaluation as indicated. 3. Interval decrease in size and FDG avidity of nonspecific right axillary lymph nodes. Nonspecific small FDG avid mediastinal and bilateral hilar lymph nodes, unchanged in number and avidity. 4. Probable physiologic FDG activity, bilateral adrenal glands, not significantly changed. 5. Interval further decreased FDG activity associated with fractures in the right 11th-12th ribs. A few non-FDG avid sclerotic lesions, unchanged, compatible with treated bone metastasis. 6. Unchanged mild gastric hypermetabolism. Please correlate clinically for gastritis.  Today she is frustrated regarding recent tightness to her left arm, face, chest, and jaw. She notes this has been worse lately since stopping alectinib and continuing on posaconazole with her new chemotherapy. She does not have this symptom when she does not take posaconazle.   10/14/2021 - Patient presents for followup. Interval MRI on 10/05 shows 2 small enhancing lesions which are new since the prior exam of 7/1/2021 suspicious for metastasis. A 5 mm focus is in the right frontal lobe along the motor strip and a 3 mm focus is in the left frontal lobe in the subcortical white matter of the precentral gyrus.  Recent bone marrow biopsy showed histoplasmosis as well as AdenoCa. Recent body imaging PET from 10/05 showing new or progressive disease at RUL, mediastinum and pelvic bones.  She has had difficulty with tolerating her alectinib and has been on varying doses over the previous year most recently having held it all together until this past week.    Restarted Alectinib last Saturday due to POD.   12/09/2021:  Patient reports today for regular follow up.  Her last MRI was prior to her 10/14 appt, which showed the presence of a 5 mm focus in the right frontal lobe along the motor strip and a 3 mm focus in the left frontal lobe in the subcortical white matter of the precentral gyrus. MRI brain 12/5/21 as per our read shows resolution of these lesions.  Saw Dr. Salinas 11/19/21, with Alectinib discontinued d/t concern of hemolytic anemia. Of note, on 12/7/21  recently presented to [facility] ED with hemoptysis, and CTA done negative for PE. Today she reports feeling well. Denies dizziness, nausea, vomiting. Continues to take poscanozole and alunbrig which she feels is elevating her blood pressure and increasing pressure in the head.   2/17/2022: Pt presents for follow-up. Reports occasional dizziness, fatigue, and lightheadedness, but unsure if due to changes in BP medication. Notes the adjustment in medications has helped HA (ongoing for some months). Occasionally has blurred vision when trying to read and write. Continues on Brigatinib, and Posaconazole (histoplasmosis)  5/18/2022- Ms. Fields presents today for follow up.  Brain MRI done 5/13/2022 showed 1. No evidence of recurrent metastatic disease. 2. A punctate enhancing focus and associated susceptibility artifact in the ventral posterior aspect of the right frontal lobe, compatible with sequela of treated metastasis, is stable. 3. 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  Continues on brigatinib.  Notes some pain to crown of head extending from bilateral shoulders over the past few months. shoulders get sore at the end of the day.  8/25/2022- Ms. Fields presents today for follow up. continues to follow with Dr. Salinas. Completed posiconazole in 4/2022. Continues on brigatinib.  CT CAP 7/22/2022 showed New patchy groundglass opacity in the right lower lobe, likely infectious/inflammatory. Enlarging indeterminate mediastinal and hilar lymphadenopathy. Follow-up chest CT in 3 months or as per clinical protocol. Stable spiculated right apical nodule and osteosclerotic metastases. No new disease in the abdomen or pelvis.  [facility] MRI 8/22/2022 showed No evidence of intracranial metastatic disease. Resolution of previously seen precentral gyrus subcentimeter enhancing metastases. A focal area of T2 hyperintensity is seen in the region of the previously seen left-sided enhancing lesion likely representing the sequelae of prior radiation therapy. Continued follow-up is advised. Stable left internal auditory canal vestibular schwannoma. Stable area of enhancement with associated susceptibility artifact within the posterior right frontal lobe compatible with sequela of treated metastasis.  feeling very well overall no headaches, no nausea, no focal weakness, no numbness or tingling.  11/30/2022- Ms. Fields presents today for follow up. MRI brain done 11/23/2022 showed * STABLE VAGUE ENHANCEMENT, MILD INCREASED T2 SIGNAL, AND HEMOSIDERIN DEPOSITION POSTERIOR SUPERIOR ASPECT RIGHT INSULA AT SITE OF PRIOR TREATED METASTASIS. 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  * NO NEW ENHANCING LESIONS IDENTIFIED.  Continues to follow with Dr. Salinas. continues on brigatinib.  CT CAP 10/21/2022 showed Since 7/22/2022.  Stable right upper lobe spiculated right upper lobe nodule.  Stable mildly enlarged hilar lymph nodes (although increased compared to 4/22/2022).  Stable sclerotic bone lesions. No finding to suggest new or progressive disease.  Overall feeling well. no headaches, no nausea, no focal weakness, no numbness or tingling. sometimes gets muscle pains.   3/30/2023- Ms. Fields presents today for follow up. SHe has contineud to follow with Dr. Salinas. continues on second line brigatinib. Brain MRI and total spine MRI done at request of neuro oncologist done 3/23/2023.   MRI brain 3/23/2023 showed  Similar minimal curvilinear enhancement along the posterior insula (35-92). Similar minimal associated T2 FLAIR signal abnormality. Minimally decreased size of previously seen focus of enhancement in the distal left IAC, currently 2.5 mm, previously 3 mm No new abnormal parenchymal or leptomeningeal enhancement.  MRI total spine 3/23/2023 showed No findings of osseous malignancy. No abnormal cord enhancement or cord signal abnormality.  CT CAP 1/20/2023 showed  Ill-defined nodular opacity in the right upper lobe is unchanged when compared to previous exam. No significant change in the sclerotic focus in the T11 vertebral body when compared to previous exam.  feels well today. no headaches, no nausea, no focal weakness. no longer having back pain.   Visit dated 8/2/2023 Patient returns for routine f/u with images for review. Reports doing well with no new neurological deficits to offer. Continues to follow with Dr. Salinas on 2nd line Brigatinib  MRI brain w w/o contrast 7/24/2023 IMPRESSION: Stable vague enhancement, mild increased T2 signal, and hemosiderin deposition posterior superior aspect right insula at site of prior treated metastasis. Stable 3 mm vestibular schwannoma fundus left IAC. No new enhancing lesions identified.  CT C/A/P 5/5/2023 IMPRESSION: Stable exam. No new disease in the chest, abdomen or pelvis. Unchanged spiculated right apical nodule, sub-4 mm left upper lobe nodules and multiple osteosclerotic lesions.  Visit dated 12/7/2023 Patient returns for routine follow up to include progress check and review of completed cranial images. Denies N/V, HA/unilateral extremity weakness/memory changes/gait disturbance/bowel/bladder dysfunction or other neurologic symptoms. No issues with speech or comprehension. Denies seizures  Continues to follow with Dr. Salinas on 2nd line Brigatinib targeting her ALK rearrangement for as long as it benefits the patient, currently at 180mg PO (per heme/onc notes)  MRI brain w w/o contrast 12/1/2023 IMPRESSION: No new areas of abnormal enhancement. Increase in size of the cortical focus of increased T2 FLAIR signal within the posterior aspect of the left frontal lobe at site of a prior treated metastasis. No associated abnormal enhancement. New 4 x 3 mm region of increased T2 signal posterior left frontal lobe near the vertex with associated decreased T1 signal and without associated enhancement. Attention to these areas on follow-up recommended.   CT C/A/P 12/1/2023  final read not available at time of this entry  VISIT dated 5/8/2024 Patient presents for routine f/u and progress check with cranial images for review. Reports doing well overall w/o any new neurological findings. Baseline muscle aches remain persistent but doesn't limit participation in activity attributes to current treatment regimen. Denies N/V, HA/unilateral extremity weakness/memory changes/gait disturbance/bowel/bladder dysfunction or other neurologic symptoms. No issues with speech or comprehension.  Sees neurologist as well.  Continues to follow with Dr. Salinas on 2nd line Brigatinib targeting her ALK rearrangement since late November 2021.  CT C/A/P 4/2/2024 IMPRESSION: Interval disease stability within the chest, abdomen, and pelvis compared to 12/1/2023: Right apical 1.3 cm irregular lesion and right lower lobe pulmonary micronodule unchanged. Sclerotic osseous lesions unchanged. Similar-appearing thickening of the endometrium.  MRI brain w w/o contrast 5/2/2024 IMPRESSION: 1. No acute intracranial abnormality. No findings suspicious for new intracranial metastases. 2. Unchanged left vestibular schwannoma. 3. Mild interval progression of overall very mild white matter disease which may be the sequelae of treatment effect, chronic microvascular ischemic disease, vasculitis, chronic migraines, and/or Lyme disease among other possibilities. 4. Nonspecific progression of an ovoid T1 hypointense focus in the medial left parietal lobe. Attention on follow-up exams is advised.   VISIT DATED: 8/29/2024 Patient returns for progress check and evaluation. Cranial images have been completed as recommended to further assess and r/o POD which was of concern on MRI in May 2024 Continues to follow with Dr. Salinas on 2nd line Brigatinib 180mg daily targeting her ALK rearrangement since late November 2021. Patient denies headache, dizziness, nausea or vomiting, vision changes and has stable gait. She follow up with Dr. Bernard on 8/16/24 who informed her that there is no changes and everything is stable. MRI on 8/15/24, awaiting final report.  CT C/A/P w w/o contrast 8/10/2024 IMPRESSION: Stable exam, with unchanged 1.2 cm right apical nodule, punctate posterior right lower lobe nodule and osteosclerotic lesions. Unchanged indeterminate endometrial thickening. No new disease in the chest, abdomen or pelvis.  MRI brain with SPECT 8/15/2024 final read pending  VISIT DATED: 2/26/2025 Ms. Fields presents for routine f/u and progress check with cranial images for review. States doing well overall but is a little upset today after a misunderstanding with her spouse. Notes mild shoulder discomfort which varies in intensity with temperature changes, but she has not need to utilize medications. Denies N/V, HA/RIGHT side weakness/memory changes/gait disturbance/bowel/bladder dysfunction or other neurologic symptoms. No issues with speech or comprehension.  Follows with neuro/onc @ [place] Continues to follow with Dr. Salinas on 2nd line Brigatinib targeting her ALK rearrangement since late November 2021. Last PETct 11/2024 with Interval increased size of mildly avid RIGHT sclerotic sacral lesion, previously was nonavid. Other are non-FDG avid sclerotic lesions compatible for treated bone metastases are stable. Otherwise, stable findings. Plans for CT C/A/P scheduled for 3/8/2025.   MRI brain w w/o contrast 2/15/2025 IMPRESSION: Interval mild increase in 1.5 cm T2 FLAIR hyperintense, non-enhancing lesion in the left medial precentral gyrus compared with 8/15/2024. Additional T2 FLAIR hyperintense, nonenhancing lesions in the right medial frontoparietal white matter and the bilateral precentral gyri are not significantly changed compared to 8/15/2024 No new enhancing lesions identified.  CT C/A/P 3/8/25 IMPRESSION: Enlarging right sacral osteosclerotic lesion (corresponding to the FDG avidity noted on recent PET/CT). Other osteosclerotic metastases appear grossly unchanged. Stable 1.6 cm right apical nodule and areas of mild interlobular septal thickening in the mid to lower lungs. Unchanged thickening of the adrenal glands. Unchanged endometrial thickening.   3/17/25: Presents for follow-up visit to discuss enlarging right sacral osteosclerotic lesion.  She saw Dr. Alicia for discussion of biopsy vs resxn of lesion, was ordered for MRI with Quicktome protocol with spectroscopy and perfusion scheduled for 3/22/2025.  Continues to follow with Dr. Salinas on 2nd line Brigatinib targeting her ALK rearrangement since late November 2021, tolerating.  She reports feeling well, no headache, no n/v, no diplopia, no neuro symptoms. She also denies back pain other than the chronic left upper back/neck muscle tension. No leg weakness, no numbness/tingling. She has no sacral pain, numbness or weakness in her lower extremities.

## 2025-03-21 NOTE — HISTORY OF PRESENT ILLNESS
[FreeTextEntry1] : 56yo P3 here for repeat colpo/pap and CT A/P findings of endometrial thickening but no measurement mentioned. Prior sono 1/2024 with thickened EMS 1.0cm, EMBx 1/2024 benign. 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 Desires STI testing today.   Hx of metastatic NSCLC to brain and currently undergoing chemo and planning to undergo RT for sacral findings. Pt overall feels well, no back pain, denies PMB.

## 2025-03-21 NOTE — PLAN
[FreeTextEntry1] : Pap and colpo done, cervix appears stenotic but no lesions seen. ECC performed. STI testing today, advised given thickened uterine lining on CT to obtain sonohyst. Mammo/sono given as well.

## 2025-03-21 NOTE — PROCEDURE
[Colposcopy] : Colposcopy  [Time out performed] : Pre-procedure time out performed.  Patient's name, date of birth and procedure confirmed. [Consent Obtained] : Consent obtained [Risks] : risks [Benefits] : benefits [Alternatives] : alternatives [Patient] : patient [No Premedication] : no premedication [Colposcopy Adequate] : colposcopy adequate [Pap Performed] : pap performed [SCI Fully Visualized] : SCI not fully visualized [ECC Performed] : ECC performed [No Abnormalities] : no abnormalities [Biopsy] : biopsy not taken [Hemostasis Obtained] : Hemostasis obtained [Tolerated Well] : the patient tolerated the procedure well [de-identified] : f/u sherlyc [de-identified] : stenotic appearing cervix, no lesions seen, ECC performed w pap

## 2025-03-27 NOTE — HISTORY OF PRESENT ILLNESS
[FreeTextEntry1] : Ms Fields has a h/o metastatic NSCLC with ALK rearrangement who presented with a single brain metastasis measuring 1.4 cm in size in the right inferior frontal gyrus. She completed gamma knife radiation therapy for a total of 2000 cGy to the right parietal area on 3/13/19.  ONCOLOGY HISTORY She presented to me as a 49 year old female with PMH of rheumatoid arthritis, who presented to [place] on 2/20 from OSH with complaints of left sided coolness. She had previously had imaging outside which revealed RUL spiculated mass, sclerotic lesion in T11, as well as 1.4 cm enhancing nodule in insula cortex with vasogenic edema and midline shift. MRI brain from 2/25/19 showed Right inferior frontal gyrus nodule consistent with a primary versus secondary neoplasm, 1.4 X 1.4 X 1.3 cm.  Punctate nodular enhancement along the distal left seventh eighth nerve complex may represent a tiny vestibular schwannoma or leptomeningeal disease. Plan made for SRS.  Pathology from right upper lobe CT guided FNA from 2/26/19 revealed adenocarcinoma, PDL1+ at 5%. She received GK in March of 2019.  She was found to have an ALK mutation and started on Alcensa.    10/2/19- Ms. Fields presents today for follow up. She underwent a brain MRI on 10/1/19 which showed no new disease. Today she is feeling well. She has some bradycardia. Denies headaches, focal weakness, numbness, tingling, nausea, vomiting. Recently back to work. Will have PET scan this coming Friday.  12/4/19- Ms. Fields presents today for follow up. She underwent a brain MRI as an inpatient on 11/27/19 which showed Enhancing lesion in the right frontal operculum just medial to the sylvian fissure is larger when compared to 10/1/2019 but is significantly smaller compared with 3/13/2019 and may represent neoplasm versus post therapy changes. No change tiny enhancing nodule left internal auditory canal. 4.5 mm in AP diameter x 6.2 mm transversely.She notably presented to [facility] on 11/27/19 with 3 episodes of left face, hand, and leg. SHe was discharged to follow up with us. She follows with . Will be seeing Dr. Salinas this week to establish care. Today she denies headaches, focal weakness, nausea, vomiting. She is on Keppra 500 BID. Not on any steroids. Will see Dr. Garcia. She has not had any further feelings of tingling to her left face and left arm. Continues on Alectinib, was dose reduced for a bit of time due to bradycardia.  Denies trouble with balance. Intermittently has a slight sensation, indescribable, to the left side of her face around her mouth, which she has previously experienced when starting Alectinib.   1/15/20- Ms. Fields presents today for follow up. She underwent an MRI with spectroscopy and perfusion this afternoon. This  CT CAP 1/7/20 showed Spiculated appearing mass in the right upper lobe, consistent with patient's  known lung cancer. Scattered sclerotic osseous lesions may represent metastatic disease. These are likely progressed from prior.  Today she is feeling well. Continues on Alectinib. Denies headaches, confusion, nausea, vomiting, No further seizures. Now on briviact for seizure management. Now following with neuro oncologist Dr. Karis Reyes.   MRI interpreted as:  Impression: Enhancement in the right basal ganglia frontal operculum larger compared with 11/27/2019 is suspicious for progression of neoplasm. MR spectroscopy does not demonstrate increased choline to creatine, against tumor recurrence. MR perfusion demonstrates decreased blood pool suggesting post therapy change. Recommend close clinical follow-up.  03/5/2020- Ms. Fields presents today for follow up. She recently was found to have increased LFTs and a new anemia with HGB 6.7. Alectinib was held. MRI brain from 2/27/2020 showed a similar appearing enhancing right subinsular and right frontal opercular lesion. Decreased edema in the larger right subinsular lesion.   PET scan 1/31/2020 showed mildly FDG avid RUL lung mass was decreased in size and metabolism when compared to 10/4/2020. There was resolution of a small mildly FDG avid right paratracheal node. Non FDG avid sclerotic lesions are compatible with treated bone mets. Today she denies headaches, focal weakness, numbness, tingling, nausea vomiting. No seizures. Last dose of steroids was this morning. Notes swelling in her legs has returned recently. Feeling extremely cold and tired.   05/28/20- Ms. Fields is seen today via Telehealth platform in follow up. Verbal consent obtained. PET/CT 05/04/20 noted mildly FDG-avid spiculated right upper lobe lung mass is unchanged on CT, and minimally increased in metabolism as compared to prior study dated 1/31/2020. Etiology is indeterminate. Small focus of residual disease is not excluded. New small hypermetabolic right perihilar and right paratracheal lymph nodes are nonspecific.  Non- FDG-avid sclerotic lesions, unchanged, compatible with treated bone metastases. She saw Dr. Humphreys on 03/20/20 with plans for bone marrow biopsy for her hemolytic anemia. She was discontinued on Alectinib due to anemia and was restarted again by Dr. Silveira. She continues to take  Briviact for seizure.  She saw Dr. Salinas on 05/08/20 with follow up plans in June.MRI brain 5/27/2020 showed Unchanged size of enhancing lesion in the right subinsular region with increased surrounding vasogeni c edema. Continued close interval follow-up is recommended. Today she feels fine. Denies headaches, nausea,  dizziness, vision or auditory changes, no weakness, numbness or tingling. On Alectinib.   8/4/20 She presents for follow up 7/30/20 MRI showed abnormal enhancing lesion with surrounding edema is again identified though decreased size and surrounding edema seen when compared prior exam.  Today she feels mostly well. Denies headaches, nausea, vomiting. Has not had any further tingling or tickling sensations on her face. Recently stopped her antifungals as they caused her SOB and edema, and this has helped. Continues on alectinib. Bone marrow biopsy for her anemia found to be consistent with histoplasmosis.    11/4/20: She presents for f/u with most recent MRI from 10/30/20 showing  decreasing enhancement and surrounding FLAIR signal abnormality along the inferior right frontal lobe consistent with response to treatment. No new lesions are identified.  3/4/2021- Ms. Fields presents today for follow up.  She continues to follow with Dr. Salinas. Continues on alectinib dose reduced after ANDREW treatment in 12/2020. Has not yet started posaconazole for histoplasmosis.   MR brain 2/26/2021 showed Small focus of enhancement in the right frontal lobe without significant interval change from the most recent exam compatible with a treated metastasis. 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  PET scan 2/12/2021 showed  1. Mildly FDG-avid spiculated right upper lobe nodule is unchanged in size and metabolism, compatible with treated disease. 2. Few new small FDG-avid right axillary lymph nodes are nonspecific, probably reactive. Further evaluation may be performed with ultrasound. Nonspecific small FDG-avid mediastinal and bilateral hilar lymph nodes, unchanged in number and avidity. Right paratracheal node demonstrates new calcification. 3. Probable physiologic FDG activity, bilateral adrenal glands, unchanged. 4. Mild FDG activity associated with fractures in right 11th and 12th ribs, decreased as compared to prior study. Few non-FDG-avid sclerotic lesions, unchanged, compatible with treated bone metastases.  Today she feels very well. Had a headache after her cataract surgery, but none since. No seizures, no focal weakness.  7/15/2021- Ms. Fields presents today for follow up. She was admitted 6/29-7/1 with left arm, left face, and left chest discomfort. EEG normal. MRI head 7/1/2021 showed  1.  No new lesions are identified. 2.  Stable appearance of a previously treated metastatic lesion as described. 3.  Stable 3 mm enhancing lesion in the left internal auditory canal which likely represents a vestibular schwannoma.  Alectinib held upon discharge. discharged on briviact 25mg in AM, 50mg in PM.  She started pemtrexed on 7/9.  Notably PET scan on 6/21/2021 showed  1. Mildly FDG avid spiculated right upper lobe nodule is unchanged in size and metabolism, compatible with treated disease. 2. Nonspecific new mild FDG avidity of bilateral level Ib cervical lymph nodes which have minimally increased in size. Please correlate clinically and with ultrasound for further evaluation as indicated. 3. Interval decrease in size and FDG avidity of nonspecific right axillary lymph nodes. Nonspecific small FDG avid mediastinal and bilateral hilar lymph nodes, unchanged in number and avidity. 4. Probable physiologic FDG activity, bilateral adrenal glands, not significantly changed. 5. Interval further decreased FDG activity associated with fractures in the right 11th-12th ribs. A few non-FDG avid sclerotic lesions, unchanged, compatible with treated bone metastasis. 6. Unchanged mild gastric hypermetabolism. Please correlate clinically for gastritis.  Today she is frustrated regarding recent tightness to her left arm, face, chest, and jaw. She notes this has been worse lately since stopping alectinib and continuing on posaconazole with her new chemotherapy. She does not have this symptom when she does not take posaconazle.   10/14/2021 - Patient presents for followup. Interval MRI on 10/05 shows 2 small enhancing lesions which are new since the prior exam of 7/1/2021 suspicious for metastasis. A 5 mm focus is in the right frontal lobe along the motor strip and a 3 mm focus is in the left frontal lobe in the subcortical white matter of the precentral gyrus.  Recent bone marrow biopsy showed histoplasmosis as well as AdenoCa. Recent body imaging PET from 10/05 showing new or progressive disease at RUL, mediastinum and pelvic bones.  She has had difficulty with tolerating her alectinib and has been on varying doses over the previous year most recently having held it all together until this past week.    Restarted Alectinib last Saturday due to POD.   12/09/2021:  Patient reports today for regular follow up.  Her last MRI was prior to her 10/14 appt, which showed the presence of a 5 mm focus in the right frontal lobe along the motor strip and a 3 mm focus in the left frontal lobe in the subcortical white matter of the precentral gyrus. MRI brain 12/5/21 as per our read shows resolution of these lesions.  Saw Dr. Salinas 11/19/21, with Alectinib discontinued d/t concern of hemolytic anemia. Of note, on 12/7/21  recently presented to [facility] ED with hemoptysis, and CTA done negative for PE. Today she reports feeling well. Denies dizziness, nausea, vomiting. Continues to take poscanozole and alunbrig which she feels is elevating her blood pressure and increasing pressure in the head.   2/17/2022: Pt presents for follow-up. Reports occasional dizziness, fatigue, and lightheadedness, but unsure if due to changes in BP medication. Notes the adjustment in medications has helped HA (ongoing for some months). Occasionally has blurred vision when trying to read and write. Continues on Brigatinib, and Posaconazole (histoplasmosis)  5/18/2022- Ms. Fields presents today for follow up.  Brain MRI done 5/13/2022 showed 1. No evidence of recurrent metastatic disease. 2. A punctate enhancing focus and associated susceptibility artifact in the ventral posterior aspect of the right frontal lobe, compatible with sequela of treated metastasis, is stable. 3. 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  Continues on brigatinib.  Notes some pain to crown of head extending from bilateral shoulders over the past few months. shoulders get sore at the end of the day.  8/25/2022- Ms. Fields presents today for follow up. continues to follow with Dr. Salinas. Completed posiconazole in 4/2022. Continues on brigatinib.  CT CAP 7/22/2022 showed New patchy groundglass opacity in the right lower lobe, likely infectious/inflammatory. Enlarging indeterminate mediastinal and hilar lymphadenopathy. Follow-up chest CT in 3 months or as per clinical protocol. Stable spiculated right apical nodule and osteosclerotic metastases. No new disease in the abdomen or pelvis.  [facility] MRI 8/22/2022 showed No evidence of intracranial metastatic disease. Resolution of previously seen precentral gyrus subcentimeter enhancing metastases. A focal area of T2 hyperintensity is seen in the region of the previously seen left-sided enhancing lesion likely representing the sequelae of prior radiation therapy. Continued follow-up is advised. Stable left internal auditory canal vestibular schwannoma. Stable area of enhancement with associated susceptibility artifact within the posterior right frontal lobe compatible with sequela of treated metastasis.  feeling very well overall no headaches, no nausea, no focal weakness, no numbness or tingling.  11/30/2022- Ms. Fields presents today for follow up. MRI brain done 11/23/2022 showed * STABLE VAGUE ENHANCEMENT, MILD INCREASED T2 SIGNAL, AND HEMOSIDERIN DEPOSITION POSTERIOR SUPERIOR ASPECT RIGHT INSULA AT SITE OF PRIOR TREATED METASTASIS. 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  * NO NEW ENHANCING LESIONS IDENTIFIED.  Continues to follow with Dr. Salinas. continues on brigatinib.  CT CAP 10/21/2022 showed Since 7/22/2022.  Stable right upper lobe spiculated right upper lobe nodule.  Stable mildly enlarged hilar lymph nodes (although increased compared to 4/22/2022).  Stable sclerotic bone lesions. No finding to suggest new or progressive disease.  Overall feeling well. no headaches, no nausea, no focal weakness, no numbness or tingling. sometimes gets muscle pains.   3/30/2023- Ms. Fields presents today for follow up. SHe has contineud to follow with Dr. Salinas. continues on second line brigatinib. Brain MRI and total spine MRI done at request of neuro oncologist done 3/23/2023.   MRI brain 3/23/2023 showed  Similar minimal curvilinear enhancement along the posterior insula (35-92). Similar minimal associated T2 FLAIR signal abnormality. Minimally decreased size of previously seen focus of enhancement in the distal left IAC, currently 2.5 mm, previously 3 mm No new abnormal parenchymal or leptomeningeal enhancement.  MRI total spine 3/23/2023 showed No findings of osseous malignancy. No abnormal cord enhancement or cord signal abnormality.  CT CAP 1/20/2023 showed  Ill-defined nodular opacity in the right upper lobe is unchanged when compared to previous exam. No significant change in the sclerotic focus in the T11 vertebral body when compared to previous exam.  feels well today. no headaches, no nausea, no focal weakness. no longer having back pain.   Visit dated 8/2/2023 Patient returns for routine f/u with images for review. Reports doing well with no new neurological deficits to offer. Continues to follow with Dr. Salinas on 2nd line Brigatinib  MRI brain w w/o contrast 7/24/2023 IMPRESSION: Stable vague enhancement, mild increased T2 signal, and hemosiderin deposition posterior superior aspect right insula at site of prior treated metastasis. Stable 3 mm vestibular schwannoma fundus left IAC. No new enhancing lesions identified.  CT C/A/P 5/5/2023 IMPRESSION: Stable exam. No new disease in the chest, abdomen or pelvis. Unchanged spiculated right apical nodule, sub-4 mm left upper lobe nodules and multiple osteosclerotic lesions.  Visit dated 12/7/2023 Patient returns for routine follow up to include progress check and review of completed cranial images. Denies N/V, HA/unilateral extremity weakness/memory changes/gait disturbance/bowel/bladder dysfunction or other neurologic symptoms. No issues with speech or comprehension. Denies seizures  Continues to follow with Dr. Salinas on 2nd line Brigatinib targeting her ALK rearrangement for as long as it benefits the patient, currently at 180mg PO (per heme/onc notes)  MRI brain w w/o contrast 12/1/2023 IMPRESSION: No new areas of abnormal enhancement. Increase in size of the cortical focus of increased T2 FLAIR signal within the posterior aspect of the left frontal lobe at site of a prior treated metastasis. No associated abnormal enhancement. New 4 x 3 mm region of increased T2 signal posterior left frontal lobe near the vertex with associated decreased T1 signal and without associated enhancement. Attention to these areas on follow-up recommended.   CT C/A/P 12/1/2023  final read not available at time of this entry  VISIT dated 5/8/2024 Patient presents for routine f/u and progress check with cranial images for review. Reports doing well overall w/o any new neurological findings. Baseline muscle aches remain persistent but doesn't limit participation in activity attributes to current treatment regimen. Denies N/V, HA/unilateral extremity weakness/memory changes/gait disturbance/bowel/bladder dysfunction or other neurologic symptoms. No issues with speech or comprehension.  Sees neurologist as well.  Continues to follow with Dr. Salinas on 2nd line Brigatinib targeting her ALK rearrangement since late November 2021.  CT C/A/P 4/2/2024 IMPRESSION: Interval disease stability within the chest, abdomen, and pelvis compared to 12/1/2023: Right apical 1.3 cm irregular lesion and right lower lobe pulmonary micronodule unchanged. Sclerotic osseous lesions unchanged. Similar-appearing thickening of the endometrium.  MRI brain w w/o contrast 5/2/2024 IMPRESSION: 1. No acute intracranial abnormality. No findings suspicious for new intracranial metastases. 2. Unchanged left vestibular schwannoma. 3. Mild interval progression of overall very mild white matter disease which may be the sequelae of treatment effect, chronic microvascular ischemic disease, vasculitis, chronic migraines, and/or Lyme disease among other possibilities. 4. Nonspecific progression of an ovoid T1 hypointense focus in the medial left parietal lobe. Attention on follow-up exams is advised.   VISIT DATED: 8/29/2024 Patient returns for progress check and evaluation. Cranial images have been completed as recommended to further assess and r/o POD which was of concern on MRI in May 2024 Continues to follow with Dr. Salinas on 2nd line Brigatinib 180mg daily targeting her ALK rearrangement since late November 2021. Patient denies headache, dizziness, nausea or vomiting, vision changes and has stable gait. She follow up with Dr. Bernard on 8/16/24 who informed her that there is no changes and everything is stable. MRI on 8/15/24, awaiting final report.  CT C/A/P w w/o contrast 8/10/2024 IMPRESSION: Stable exam, with unchanged 1.2 cm right apical nodule, punctate posterior right lower lobe nodule and osteosclerotic lesions. Unchanged indeterminate endometrial thickening. No new disease in the chest, abdomen or pelvis.  MRI brain with SPECT 8/15/2024 final read pending  VISIT DATED: 2/26/2025 Ms. Fields presents for routine f/u and progress check with cranial images for review. States doing well overall but is a little upset today after a misunderstanding with her spouse. Notes mild shoulder discomfort which varies in intensity with temperature changes, but she has not need to utilize medications. Denies N/V, HA/RIGHT side weakness/memory changes/gait disturbance/bowel/bladder dysfunction or other neurologic symptoms. No issues with speech or comprehension.  Follows with neuro/onc @ [facility] Continues to follow with Dr. Salinas on 2nd line Brigatinib targeting her ALK rearrangement since late November 2021. Last PETct 11/2024 with Interval increased size of mildly avid RIGHT sclerotic sacral lesion, previously was nonavid. Other are non-FDG avid sclerotic lesions compatible for treated bone metastases are stable. Otherwise, stable findings. Plans for CT C/A/P scheduled for 3/8/2025.   MRI brain w w/o contrast 2/15/2025 IMPRESSION: Interval mild increase in 1.5 cm T2 FLAIR hyperintense, non-enhancing lesion in the left medial precentral gyrus compared with 8/15/2024. Additional T2 FLAIR hyperintense, nonenhancing lesions in the right medial frontoparietal white matter and the bilateral precentral gyri are not significantly changed compared to 8/15/2024 No new enhancing lesions identified.  CT C/A/P 3/8/25 IMPRESSION: Enlarging right sacral osteosclerotic lesion (corresponding to the FDG avidity noted on recent PET/CT). Other osteosclerotic metastases appear grossly unchanged. Stable 1.6 cm right apical nodule and areas of mild interlobular septal thickening in the mid to lower lungs. Unchanged thickening of the adrenal glands. Unchanged endometrial thickening.   3/17/25: Presents for follow-up visit to discuss enlarging right sacral osteosclerotic lesion.  She saw Dr. Alicia for discussion of biopsy vs resxn of lesion, was ordered for MRI with Quicktome protocol with spectroscopy and perfusion scheduled for 3/22/2025.  Continues to follow with Dr. Salinas on 2nd line Brigatinib targeting her ALK rearrangement since late November 2021, tolerating.  She reports feeling well, no headache, no n/v, no diplopia, no neuro symptoms. She also denies back pain other than the chronic left upper back/neck muscle tension. No leg weakness, no numbness/tingling. She has no sacral pain, numbness or weakness in her lower extremities.   VISIT DATED: 4/2/2025 Ms. Fields returns for continuation of care with MR spect/perfusion to r/o viable disease vs treatment changes to guide next steps in her care. Awaiting date for RT to sacrum Continues to follow with Dr. Salinas on 2nd line Brigatinib targeting her ALK rearrangement since late November 2021, tolerating.   MRI brain with SPECT/PERFUSION 3/24/2025 IMPRESSION: -T2/FLAIR hyperintense lesion involving the left sensorimotor region is grossly stable since MRI 2/15/2025. Spectroscopy and perfusion evaluation favors benign entity/posttreatment changes or low-grade neoplasm over active metastasis/high-grade neoplasm. -Multiple additional similar but smaller lesions are also grossly stable throughout the bilateral cerebral hemispheres. -0.3 cm left IAC enhancing lesion is suspicious for a schwannoma, stable.

## 2025-03-27 NOTE — PHYSICAL EXAM
[General Appearance - Alert] : alert [] : no respiratory distress [Oriented To Time, Place, And Person] : oriented to person, place, and time [Affect] : the affect was normal [de-identified] : EXAM LIMITED VISIT TELEHEALTH

## 2025-03-27 NOTE — REASON FOR VISIT
[Home] : at home, [unfilled] , at the time of the visit. [Other Location: e.g. Home (Enter Location, City,State)___] : at [unfilled] [Telehealth (audio & video)] : This visit was provided via telehealth using real-time 2-way audio visual technology. [Verbal consent obtained from patient] : the patient, [unfilled] [Routine Follow-Up] : routine follow-up visit for [Brain Metastasis] : brain metastasis

## 2025-03-27 NOTE — REVIEW OF SYSTEMS
[Negative] : Neurological [Dizziness: Grade 0] : Dizziness: Grade 0  [Headache: Grade 0] : Headache: Grade 0 [Peripheral Sensory Neuropathy: Grade 0] : Peripheral Sensory Neuropathy: Grade 0 [Shortness Of Breath] : no shortness of breath [Confused] : no confusion [Dizziness] : no dizziness [Fainting] : no fainting [Difficulty Walking] : no difficulty walking [FreeTextEntry3] : right eye blurry vision attributes to cataract (had left cataract sx) [de-identified] : Denies HA/gait disturbance [de-identified] : RLLAVELLE

## 2025-03-27 NOTE — VITALS
[Maximal Pain Intensity: 4/10] : 4/10 [Least Pain Intensity: 0/10] : 0/10 [Pain Duration: ___] : Pain duration: [unfilled] [Pain Location: ___] : Pain Location: [unfilled] [OTC] : OTC [90: Able to carry normal activity; minor signs or symptoms of disease.] : 90: Able to carry normal activity; minor signs or symptoms of disease.

## 2025-04-09 NOTE — HISTORY OF PRESENT ILLNESS
[FreeTextEntry1] : Ms Fields has a h/o metastatic NSCLC with ALK rearrangement who presented with a single brain metastasis measuring 1.4 cm in size in the right inferior frontal gyrus. She completed gamma knife radiation therapy for a total of 2000 cGy to the right parietal area on 3/13/19.  ONCOLOGY HISTORY She presented to me as a 49 year old female with PMH of rheumatoid arthritis, who presented to [facility] on 2/20 from OSH with complaints of left sided coolness. She had previously had imaging outside which revealed RUL spiculated mass, sclerotic lesion in T11, as well as 1.4 cm enhancing nodule in insula cortex with vasogenic edema and midline shift. MRI brain from 2/25/19 showed Right inferior frontal gyrus nodule consistent with a primary versus secondary neoplasm, 1.4 X 1.4 X 1.3 cm.  Punctate nodular enhancement along the distal left seventh eighth nerve complex may represent a tiny vestibular schwannoma or leptomeningeal disease. Plan made for SRS.  Pathology from right upper lobe CT guided FNA from 2/26/19 revealed adenocarcinoma, PDL1+ at 5%. She received GK in March of 2019.  She was found to have an ALK mutation and started on Alcensa.    10/2/19- Ms. Fields presents today for follow up. She underwent a brain MRI on 10/1/19 which showed no new disease. Today she is feeling well. She has some bradycardia. Denies headaches, focal weakness, numbness, tingling, nausea, vomiting. Recently back to work. Will have PET scan this coming Friday.  12/4/19- Ms. Fields presents today for follow up. She underwent a brain MRI as an inpatient on 11/27/19 which showed Enhancing lesion in the right frontal operculum just medial to the sylvian fissure is larger when compared to 10/1/2019 but is significantly smaller compared with 3/13/2019 and may represent neoplasm versus post therapy changes. No change tiny enhancing nodule left internal auditory canal. 4.5 mm in AP diameter x 6.2 mm transversely.She notably presented to [facility] on 11/27/19 with 3 episodes of left face, hand, and leg. SHe was discharged to follow up with us. She follows with . Will be seeing Dr. Salinas this week to establish care. Today she denies headaches, focal weakness, nausea, vomiting. She is on Keppra 500 BID. Not on any steroids. Will see Dr. Garcia. She has not had any further feelings of tingling to her left face and left arm. Continues on Alectinib, was dose reduced for a bit of time due to bradycardia.  Denies trouble with balance. Intermittently has a slight sensation, indescribable, to the left side of her face around her mouth, which she has previously experienced when starting Alectinib.   1/15/20- Ms. Fields presents today for follow up. She underwent an MRI with spectroscopy and perfusion this afternoon. This  CT CAP 1/7/20 showed Spiculated appearing mass in the right upper lobe, consistent with patient's  known lung cancer. Scattered sclerotic osseous lesions may represent metastatic disease. These are likely progressed from prior.  Today she is feeling well. Continues on Alectinib. Denies headaches, confusion, nausea, vomiting, No further seizures. Now on briviact for seizure management. Now following with neuro oncologist Dr. Karis Reyes.   MRI interpreted as:  Impression: Enhancement in the right basal ganglia frontal operculum larger compared with 11/27/2019 is suspicious for progression of neoplasm. MR spectroscopy does not demonstrate increased choline to creatine, against tumor recurrence. MR perfusion demonstrates decreased blood pool suggesting post therapy change. Recommend close clinical follow-up.  03/5/2020- Ms. Fields presents today for follow up. She recently was found to have increased LFTs and a new anemia with HGB 6.7. Alectinib was held. MRI brain from 2/27/2020 showed a similar appearing enhancing right subinsular and right frontal opercular lesion. Decreased edema in the larger right subinsular lesion.   PET scan 1/31/2020 showed mildly FDG avid RUL lung mass was decreased in size and metabolism when compared to 10/4/2020. There was resolution of a small mildly FDG avid right paratracheal node. Non FDG avid sclerotic lesions are compatible with treated bone mets. Today she denies headaches, focal weakness, numbness, tingling, nausea vomiting. No seizures. Last dose of steroids was this morning. Notes swelling in her legs has returned recently. Feeling extremely cold and tired.   05/28/20- Ms. Fields is seen today via Telehealth platform in follow up. Verbal consent obtained. PET/CT 05/04/20 noted mildly FDG-avid spiculated right upper lobe lung mass is unchanged on CT, and minimally increased in metabolism as compared to prior study dated 1/31/2020. Etiology is indeterminate. Small focus of residual disease is not excluded. New small hypermetabolic right perihilar and right paratracheal lymph nodes are nonspecific.  Non- FDG-avid sclerotic lesions, unchanged, compatible with treated bone metastases. She saw Dr. Humphreys on 03/20/20 with plans for bone marrow biopsy for her hemolytic anemia. She was discontinued on Alectinib due to anemia and was restarted again by Dr. Silveira. She continues to take  Briviact for seizure.  She saw Dr. Salinas on 05/08/20 with follow up plans in June.MRI brain 5/27/2020 showed Unchanged size of enhancing lesion in the right subinsular region with increased surrounding vasogeni c edema. Continued close interval follow-up is recommended. Today she feels fine. Denies headaches, nausea,  dizziness, vision or auditory changes, no weakness, numbness or tingling. On Alectinib.   8/4/20 She presents for follow up 7/30/20 MRI showed abnormal enhancing lesion with surrounding edema is again identified though decreased size and surrounding edema seen when compared prior exam.  Today she feels mostly well. Denies headaches, nausea, vomiting. Has not had any further tingling or tickling sensations on her face. Recently stopped her antifungals as they caused her SOB and edema, and this has helped. Continues on alectinib. Bone marrow biopsy for her anemia found to be consistent with histoplasmosis.    11/4/20: She presents for f/u with most recent MRI from 10/30/20 showing  decreasing enhancement and surrounding FLAIR signal abnormality along the inferior right frontal lobe consistent with response to treatment. No new lesions are identified.  3/4/2021- Ms. Fields presents today for follow up.  She continues to follow with Dr. Salinas. Continues on alectinib dose reduced after ANDREW treatment in 12/2020. Has not yet started posaconazole for histoplasmosis.   MR brain 2/26/2021 showed Small focus of enhancement in the right frontal lobe without significant interval change from the most recent exam compatible with a treated metastasis. 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  PET scan 2/12/2021 showed  1. Mildly FDG-avid spiculated right upper lobe nodule is unchanged in size and metabolism, compatible with treated disease. 2. Few new small FDG-avid right axillary lymph nodes are nonspecific, probably reactive. Further evaluation may be performed with ultrasound. Nonspecific small FDG-avid mediastinal and bilateral hilar lymph nodes, unchanged in number and avidity. Right paratracheal node demonstrates new calcification. 3. Probable physiologic FDG activity, bilateral adrenal glands, unchanged. 4. Mild FDG activity associated with fractures in right 11th and 12th ribs, decreased as compared to prior study. Few non-FDG-avid sclerotic lesions, unchanged, compatible with treated bone metastases.  Today she feels very well. Had a headache after her cataract surgery, but none since. No seizures, no focal weakness.  7/15/2021- Ms. Fields presents today for follow up. She was admitted 6/29-7/1 with left arm, left face, and left chest discomfort. EEG normal. MRI head 7/1/2021 showed  1.  No new lesions are identified. 2.  Stable appearance of a previously treated metastatic lesion as described. 3.  Stable 3 mm enhancing lesion in the left internal auditory canal which likely represents a vestibular schwannoma.  Alectinib held upon discharge. discharged on briviact 25mg in AM, 50mg in PM.  She started pemtrexed on 7/9.  Notably PET scan on 6/21/2021 showed  1. Mildly FDG avid spiculated right upper lobe nodule is unchanged in size and metabolism, compatible with treated disease. 2. Nonspecific new mild FDG avidity of bilateral level Ib cervical lymph nodes which have minimally increased in size. Please correlate clinically and with ultrasound for further evaluation as indicated. 3. Interval decrease in size and FDG avidity of nonspecific right axillary lymph nodes. Nonspecific small FDG avid mediastinal and bilateral hilar lymph nodes, unchanged in number and avidity. 4. Probable physiologic FDG activity, bilateral adrenal glands, not significantly changed. 5. Interval further decreased FDG activity associated with fractures in the right 11th-12th ribs. A few non-FDG avid sclerotic lesions, unchanged, compatible with treated bone metastasis. 6. Unchanged mild gastric hypermetabolism. Please correlate clinically for gastritis.  Today she is frustrated regarding recent tightness to her left arm, face, chest, and jaw. She notes this has been worse lately since stopping alectinib and continuing on posaconazole with her new chemotherapy. She does not have this symptom when she does not take posaconazle.   10/14/2021 - Patient presents for followup. Interval MRI on 10/05 shows 2 small enhancing lesions which are new since the prior exam of 7/1/2021 suspicious for metastasis. A 5 mm focus is in the right frontal lobe along the motor strip and a 3 mm focus is in the left frontal lobe in the subcortical white matter of the precentral gyrus.  Recent bone marrow biopsy showed histoplasmosis as well as AdenoCa. Recent body imaging PET from 10/05 showing new or progressive disease at RUL, mediastinum and pelvic bones.  She has had difficulty with tolerating her alectinib and has been on varying doses over the previous year most recently having held it all together until this past week.    Restarted Alectinib last Saturday due to POD.   12/09/2021:  Patient reports today for regular follow up.  Her last MRI was prior to her 10/14 appt, which showed the presence of a 5 mm focus in the right frontal lobe along the motor strip and a 3 mm focus in the left frontal lobe in the subcortical white matter of the precentral gyrus. MRI brain 12/5/21 as per our read shows resolution of these lesions.  Saw Dr. Salinas 11/19/21, with Alectinib discontinued d/t concern of hemolytic anemia. Of note, on 12/7/21  recently presented to [facility] ED with hemoptysis, and CTA done negative for PE. Today she reports feeling well. Denies dizziness, nausea, vomiting. Continues to take poscanozole and alunbrig which she feels is elevating her blood pressure and increasing pressure in the head.   2/17/2022: Pt presents for follow-up. Reports occasional dizziness, fatigue, and lightheadedness, but unsure if due to changes in BP medication. Notes the adjustment in medications has helped HA (ongoing for some months). Occasionally has blurred vision when trying to read and write. Continues on Brigatinib, and Posaconazole (histoplasmosis)  5/18/2022- Ms. Fields presents today for follow up.  Brain MRI done 5/13/2022 showed 1. No evidence of recurrent metastatic disease. 2. A punctate enhancing focus and associated susceptibility artifact in the ventral posterior aspect of the right frontal lobe, compatible with sequela of treated metastasis, is stable. 3. 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  Continues on brigatinib.  Notes some pain to crown of head extending from bilateral shoulders over the past few months. shoulders get sore at the end of the day.  8/25/2022- Ms. Fields presents today for follow up. continues to follow with Dr. Salinas. Completed posiconazole in 4/2022. Continues on brigatinib.  CT CAP 7/22/2022 showed New patchy groundglass opacity in the right lower lobe, likely infectious/inflammatory. Enlarging indeterminate mediastinal and hilar lymphadenopathy. Follow-up chest CT in 3 months or as per clinical protocol. Stable spiculated right apical nodule and osteosclerotic metastases. No new disease in the abdomen or pelvis.  [facility] MRI 8/22/2022 showed No evidence of intracranial metastatic disease. Resolution of previously seen precentral gyrus subcentimeter enhancing metastases. A focal area of T2 hyperintensity is seen in the region of the previously seen left-sided enhancing lesion likely representing the sequelae of prior radiation therapy. Continued follow-up is advised. Stable left internal auditory canal vestibular schwannoma. Stable area of enhancement with associated susceptibility artifact within the posterior right frontal lobe compatible with sequela of treated metastasis.  feeling very well overall no headaches, no nausea, no focal weakness, no numbness or tingling.  11/30/2022- Ms. Fields presents today for follow up. MRI brain done 11/23/2022 showed * STABLE VAGUE ENHANCEMENT, MILD INCREASED T2 SIGNAL, AND HEMOSIDERIN DEPOSITION POSTERIOR SUPERIOR ASPECT RIGHT INSULA AT SITE OF PRIOR TREATED METASTASIS. 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  * NO NEW ENHANCING LESIONS IDENTIFIED.  Continues to follow with Dr. Salinas. continues on brigatinib.  CT CAP 10/21/2022 showed Since 7/22/2022.  Stable right upper lobe spiculated right upper lobe nodule.  Stable mildly enlarged hilar lymph nodes (although increased compared to 4/22/2022).  Stable sclerotic bone lesions. No finding to suggest new or progressive disease.  Overall feeling well. no headaches, no nausea, no focal weakness, no numbness or tingling. sometimes gets muscle pains.   3/30/2023- Ms. Fields presents today for follow up. SHe has contineud to follow with Dr. Salinas. continues on second line brigatinib. Brain MRI and total spine MRI done at request of neuro oncologist done 3/23/2023.   MRI brain 3/23/2023 showed  Similar minimal curvilinear enhancement along the posterior insula (35-92). Similar minimal associated T2 FLAIR signal abnormality. Minimally decreased size of previously seen focus of enhancement in the distal left IAC, currently 2.5 mm, previously 3 mm No new abnormal parenchymal or leptomeningeal enhancement.  MRI total spine 3/23/2023 showed No findings of osseous malignancy. No abnormal cord enhancement or cord signal abnormality.  CT CAP 1/20/2023 showed  Ill-defined nodular opacity in the right upper lobe is unchanged when compared to previous exam. No significant change in the sclerotic focus in the T11 vertebral body when compared to previous exam.  feels well today. no headaches, no nausea, no focal weakness. no longer having back pain.   Visit dated 8/2/2023 Patient returns for routine f/u with images for review. Reports doing well with no new neurological deficits to offer. Continues to follow with Dr. Salinas on 2nd line Brigatinib  MRI brain w w/o contrast 7/24/2023 IMPRESSION: Stable vague enhancement, mild increased T2 signal, and hemosiderin deposition posterior superior aspect right insula at site of prior treated metastasis. Stable 3 mm vestibular schwannoma fundus left IAC. No new enhancing lesions identified.  CT C/A/P 5/5/2023 IMPRESSION: Stable exam. No new disease in the chest, abdomen or pelvis. Unchanged spiculated right apical nodule, sub-4 mm left upper lobe nodules and multiple osteosclerotic lesions.  Visit dated 12/7/2023 Patient returns for routine follow up to include progress check and review of completed cranial images. Denies N/V, HA/unilateral extremity weakness/memory changes/gait disturbance/bowel/bladder dysfunction or other neurologic symptoms. No issues with speech or comprehension. Denies seizures  Continues to follow with Dr. Salinas on 2nd line Brigatinib targeting her ALK rearrangement for as long as it benefits the patient, currently at 180mg PO (per heme/onc notes)  MRI brain w w/o contrast 12/1/2023 IMPRESSION: No new areas of abnormal enhancement. Increase in size of the cortical focus of increased T2 FLAIR signal within the posterior aspect of the left frontal lobe at site of a prior treated metastasis. No associated abnormal enhancement. New 4 x 3 mm region of increased T2 signal posterior left frontal lobe near the vertex with associated decreased T1 signal and without associated enhancement. Attention to these areas on follow-up recommended.   CT C/A/P 12/1/2023  final read not available at time of this entry  VISIT dated 5/8/2024 Patient presents for routine f/u and progress check with cranial images for review. Reports doing well overall w/o any new neurological findings. Baseline muscle aches remain persistent but doesn't limit participation in activity attributes to current treatment regimen. Denies N/V, HA/unilateral extremity weakness/memory changes/gait disturbance/bowel/bladder dysfunction or other neurologic symptoms. No issues with speech or comprehension.  Sees neurologist as well.  Continues to follow with Dr. Salinas on 2nd line Brigatinib targeting her ALK rearrangement since late November 2021.  CT C/A/P 4/2/2024 IMPRESSION: Interval disease stability within the chest, abdomen, and pelvis compared to 12/1/2023: Right apical 1.3 cm irregular lesion and right lower lobe pulmonary micronodule unchanged. Sclerotic osseous lesions unchanged. Similar-appearing thickening of the endometrium.  MRI brain w w/o contrast 5/2/2024 IMPRESSION: 1. No acute intracranial abnormality. No findings suspicious for new intracranial metastases. 2. Unchanged left vestibular schwannoma. 3. Mild interval progression of overall very mild white matter disease which may be the sequelae of treatment effect, chronic microvascular ischemic disease, vasculitis, chronic migraines, and/or Lyme disease among other possibilities. 4. Nonspecific progression of an ovoid T1 hypointense focus in the medial left parietal lobe. Attention on follow-up exams is advised.   VISIT DATED: 8/29/2024 Patient returns for progress check and evaluation. Cranial images have been completed as recommended to further assess and r/o POD which was of concern on MRI in May 2024 Continues to follow with Dr. Salinas on 2nd line Brigatinib 180mg daily targeting her ALK rearrangement since late November 2021. Patient denies headache, dizziness, nausea or vomiting, vision changes and has stable gait. She follow up with Dr. Bernard on 8/16/24 who informed her that there is no changes and everything is stable. MRI on 8/15/24, awaiting final report.  CT C/A/P w w/o contrast 8/10/2024 IMPRESSION: Stable exam, with unchanged 1.2 cm right apical nodule, punctate posterior right lower lobe nodule and osteosclerotic lesions. Unchanged indeterminate endometrial thickening. No new disease in the chest, abdomen or pelvis.  MRI brain with SPECT 8/15/2024 final read pending  VISIT DATED: 2/26/2025 Ms. Fields presents for routine f/u and progress check with cranial images for review. States doing well overall but is a little upset today after a misunderstanding with her spouse. Notes mild shoulder discomfort which varies in intensity with temperature changes, but she has not need to utilize medications. Denies N/V, HA/RIGHT side weakness/memory changes/gait disturbance/bowel/bladder dysfunction or other neurologic symptoms. No issues with speech or comprehension.  Follows with neuro/onc @ [place] Continues to follow with Dr. Salinas on 2nd line Brigatinib targeting her ALK rearrangement since late November 2021. Last PETct 11/2024 with Interval increased size of mildly avid RIGHT sclerotic sacral lesion, previously was nonavid. Other are non-FDG avid sclerotic lesions compatible for treated bone metastases are stable. Otherwise, stable findings. Plans for CT C/A/P scheduled for 3/8/2025.   MRI brain w w/o contrast 2/15/2025 IMPRESSION: Interval mild increase in 1.5 cm T2 FLAIR hyperintense, non-enhancing lesion in the left medial precentral gyrus compared with 8/15/2024. Additional T2 FLAIR hyperintense, nonenhancing lesions in the right medial frontoparietal white matter and the bilateral precentral gyri are not significantly changed compared to 8/15/2024 No new enhancing lesions identified.  CT C/A/P 3/8/25 IMPRESSION: Enlarging right sacral osteosclerotic lesion (corresponding to the FDG avidity noted on recent PET/CT). Other osteosclerotic metastases appear grossly unchanged. Stable 1.6 cm right apical nodule and areas of mild interlobular septal thickening in the mid to lower lungs. Unchanged thickening of the adrenal glands. Unchanged endometrial thickening.   3/17/25: Presents for follow-up visit to discuss enlarging right sacral osteosclerotic lesion.  She saw Dr. Alicia for discussion of biopsy vs resxn of lesion, was ordered for MRI with Quicktome protocol with spectroscopy and perfusion scheduled for 3/22/2025.  Continues to follow with Dr. Salinas on 2nd line Brigatinib targeting her ALK rearrangement since late November 2021, tolerating.  She reports feeling well, no headache, no n/v, no diplopia, no neuro symptoms. She also denies back pain other than the chronic left upper back/neck muscle tension. No leg weakness, no numbness/tingling. She has no sacral pain, numbness or weakness in her lower extremities.   VISIT DATED: 4/2/2025 Ms. Fields returns for continuation of care with MR spect/perfusion to r/o viable disease vs treatment changes to guide next steps in her care. Awaiting date for RT to sacrum Continues to follow with Dr. Salinas on 2nd line Brigatinib targeting her ALK rearrangement since late November 2021, tolerating.   MRI brain with SPECT/PERFUSION 3/24/2025 IMPRESSION: -T2/FLAIR hyperintense lesion involving the left sensorimotor region is grossly stable since MRI 2/15/2025. Spectroscopy and perfusion evaluation favors benign entity/posttreatment changes or low-grade neoplasm over active metastasis/high-grade neoplasm. -Multiple additional similar but smaller lesions are also grossly stable throughout the bilateral cerebral hemispheres. -0.3 cm left IAC enhancing lesion is suspicious for a schwannoma, stable.  VISIT DATED: 4/9/2025 Ms. Fields presents for OTV completed 2/6 Fxs thus far w/o any concerns. Denies any bowel/bladder changes

## 2025-04-09 NOTE — DISEASE MANAGEMENT
[Clinical] : TNM Stage: c [IV] : IV [TTNM] : x [NTNM] : x [MTNM] : 1 [de-identified] : 1000cGy [de-identified] : 3000cGy [de-identified] : RIGHT sacrum

## 2025-04-09 NOTE — PHYSICAL EXAM
[General Appearance - Alert] : alert [] : no respiratory distress [Oriented To Time, Place, And Person] : oriented to person, place, and time [Affect] : the affect was normal [No Focal Deficits] : no focal deficits

## 2025-04-09 NOTE — VITALS
[Pain Location: ___] : Pain Location: [unfilled] [90: Able to carry normal activity; minor signs or symptoms of disease.] : 90: Able to carry normal activity; minor signs or symptoms of disease.  [Maximal Pain Intensity: 0/10] : 0/10 [Least Pain Intensity: 0/10] : 0/10

## 2025-04-09 NOTE — REVIEW OF SYSTEMS
[Negative] : Neurological [Shortness Of Breath] : no shortness of breath [Confused] : no confusion [Dizziness] : no dizziness [Fainting] : no fainting [Difficulty Walking] : no difficulty walking [FreeTextEntry3] : right eye blurry vision attributes to cataract (had left cataract sx) [de-identified] : Denies HA/gait disturbance

## 2025-05-06 NOTE — ASSESSMENT
[Medication(s)] : Medication(s) [FreeTextEntry1] : Never-smoking woman with metastatic NSCLC with adenocarcinoma histology with ALK rearrangement. She was treated with gamma knife RT to brain metastasis in March 2019. She started systemic therapy with Alectinib in April 2019 and achieved [place]. Alectinib dose-reduced in late December 2020 due to renal function decline possibly due to anti-fungal agents for disseminated histoplasmosis. Started treatment with oral Posaconazole for histoplasmosis in mid-March 2021. Alectinib has been poorly tolerated in combination with Posaconazole, requiring dose-reductions of Alectinib. She was intolerant of the combination. Patient with sustained response on restaging PET/CT in June 2021. Alectinib was discontinued in late June 2021 due to poor tolerability in combination with posaconazole for disseminated histoplasmosis. Treated with 1 cycle of Pemetrexed chemotherapy on 7/9/21 with very poor tolerability. Further systemic therapy for lung cancer was held from July-October 2021. Restaging PET/CT October 2021 with disease progression. Restarted Alectinib 450mg BID on 10/9/21. She increased Alectinib to full dosing in late October 2021. Developed return of hemolysis in November and Alectinib permanently discontinued on 11/15/21 due to impression of drug-induced hemolytic anemia associated with this agent. Hemolysis resolved with discontinuation of offending agent (Alectinib). [facility] ct-DNA testing 11/19/21 with no evidence of ALK resistance mutations. Started 2nd line Brigatinib in late November 2021; achieved [place]. Restaging brain MRI 2/15/25 with mild increase in left medial precentral gyrus lesion - further imaging planned with Dr. Elkins and Dr. Alicia. Restaging CT C/A/P 3/8/25 showed increased size of osteosclerotic sacral lesion corresponding to area of increased FDG avidity on Nov 2024 scan.  Underwent RT to Sacrum region of oligo-progression in 6 Fx completed 4/15/25.   Recommend: -Continue treatment with 2nd line Brigatinib targeting her ALK rearrangement for as long as it benefits the patient, currently at 180mg PO daily. -Repeat labs today. Can monitor CEA; not clear if informative. Monitor CPK, amylase/lipase while on brigatinib.  No clear role/benefit for monitoring other tumor markers.   -Anemia:  monitor hemoglobin and periodic iron studies  -Questionable Histoplasmosis in BM s/p 1 year of antifungal therapy completed April 2022. Being monitored off antifungal; BM results possibly artifactual. Previously followed with ID. -F/U with Neuro-Oncologist. Titrated off Briviact. -F/U with Rad-Onc Dr. Elkins for management of Brain metastases. For f/u MRI in Late June (3 month scan).  -Given the previous deep response in bone metastases, avoided the addition of a bone modifying agent - Follow-up in July with restaging scan obtained beforehand.  PET/CT is necessary to encompass all of her metastatic sites of disease given her treatment history  - Check out form given to patient with instructions for making appointments

## 2025-05-06 NOTE — HISTORY OF PRESENT ILLNESS
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  She presented with symptomatic brain metastasis and was found to have a RUL primary tumor with intrathoracic LN, bone metastases, liver metastasis and brain metastasis.  Biopsy of the primary tumor in late Feb 2019 revealed her diagnosis.  She was treated with SRS to the brain in March 2019 with nice response.  She was started on first line Alectinib in April 2019 and achieved [place].    Patient is status post admission at [facility] from 11/27-12/4/2020 for treatment of disseminated histoplasmosis.  She was started on AmBisome with a plan to insert a PICC line however one of her blood cultures grew multiple organisms delaying the PICC line placement.  Subsequent fungal and bacterial cultures were negative.  During her hospital stay, she developed ANDREW which was felt to be secondary to the AmBisome however her alectinib was held during the hospital stay.  Abdominal ultrasound was unremarkable.  She was transfused 1 unit PRBCs prior to discharge.  She was discharged with plan to treat the histoplasmosis with oral posaconazole in the outpatient setting.  Patient has subsequently been on treatment with dose-reduced Alectinib 450mg BID since late December 2020 due to renal function.   She started Posaconazole in mid-March with the Alectinib.  Alectinib was stopped in late June 2021 due to poor tolerability with the combination.   Patient was started on Pemetrexed chemotherapy in July 2021 in effort to maintain control of her lung cancer while preserving sensitivity to ALK inhibitors and allowing her to be treated with Posaconazole for the disseminated histo.  She received 1 cycle of chemotherapy with Pemetrexed that was very poorly tolerated.  She completed roughly 1 year of Posaconazole in late April 2022.   She had repeat BM Bx in Sept 2021 showing evidence of persistent histoplasmosis and was continued on Posaconazole. Restaging PET/CT in early October with evidence of disease progression.  She restarted Alectinib 450mg BID on 10/9/21.   MRI Brain on 10/5/21 revealed two new small metastases; she had f/u with Rad-Onc and given her asymptomatic status coupled with restarting the Alectinib, the plan was to do a short-term f/u MRI at a 2 month interval in early December.    Developed return of hemolysis in November 2021.  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  Alectinib discontinued in mid-November 2021.   Started 2nd line Brigatinib in November 2021; achieved [place].  [facility] testing in July 2022 without any alterations.   Patient's case was reviewed at thoracic tumor board in December 2024: There is felt to be no clear role for the inclusion of consolidative thoracic RT at this juncture. Treated with RT to region of oligo-progression in sacrum completed April 2024.  [de-identified] : -Lung, RUL CT-guided FNA 2/26/19:  Positive for malignant cells.  Adenocarcinoma.  PDL1 positive at 5%.  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  [de-identified] : Patient continues on second line systemic therapy with brigatinib targeting her EML4-ALK fusion since Nov 2021; achieved [place].   Patient presents today with her  for f/u.  Denies significant side effects.  No F/C/N/V/D or constipation.  Occasional soreness to the left shoulder/left upper back area.  Underwent RT to Sacrum region of oligo-progression in 6 Fx completed 4/15/25.   She continues to work and is exercising.

## 2025-05-06 NOTE — PHYSICAL EXAM
[Fully active, able to carry on all pre-disease performance without restriction] : Status 0 - Fully active, able to carry on all pre-disease performance without restriction [Normal] : affect appropriate [de-identified] : No icterus  [de-identified] : No LAD [de-identified] : Clear [de-identified] : S1 S2 [de-identified] : No edema [de-identified] : No spine/CVA tenderness [de-identified] : Ambulatory

## 2025-05-06 NOTE — ASSESSMENT
[Medication(s)] : Medication(s) [FreeTextEntry1] : Never-smoking woman with metastatic NSCLC with adenocarcinoma histology with ALK rearrangement. She was treated with gamma knife RT to brain metastasis in March 2019. She started systemic therapy with Alectinib in April 2019 and achieved OK. Alectinib dose-reduced in late December 2020 due to renal function decline possibly due to anti-fungal agents for disseminated histoplasmosis. Started treatment with oral Posaconazole for histoplasmosis in mid-March 2021. Alectinib has been poorly tolerated in combination with Posaconazole, requiring dose-reductions of Alectinib. She was intolerant of the combination. Patient with sustained response on restaging PET/CT in June 2021. Alectinib was discontinued in late June 2021 due to poor tolerability in combination with posaconazole for disseminated histoplasmosis. Treated with 1 cycle of Pemetrexed chemotherapy on 7/9/21 with very poor tolerability. Further systemic therapy for lung cancer was held from July-October 2021. Restaging PET/CT October 2021 with disease progression. Restarted Alectinib 450mg BID on 10/9/21. She increased Alectinib to full dosing in late October 2021. Developed return of hemolysis in November and Alectinib permanently discontinued on 11/15/21 due to impression of drug-induced hemolytic anemia associated with this agent. Hemolysis resolved with discontinuation of offending agent (Alectinib). [facility] ct-DNA testing 11/19/21 with no evidence of ALK resistance mutations. Started 2nd line Brigatinib in late November 2021; achieved OK. Restaging brain MRI 2/15/25 with mild increase in left medial precentral gyrus lesion - further imaging planned with Dr. Elkins and Dr. Alicia. Restaging CT C/A/P 3/8/25 showed increased size of osteosclerotic sacral lesion corresponding to area of increased FDG avidity on Nov 2024 scan.  Underwent RT to Sacrum region of oligo-progression in 6 Fx completed 4/15/25.   Recommend: -Continue treatment with 2nd line Brigatinib targeting her ALK rearrangement for as long as it benefits the patient, currently at 180mg PO daily. -Repeat labs today. Can monitor CEA; not clear if informative. Monitor CPK, amylase/lipase while on brigatinib.  No clear role/benefit for monitoring other tumor markers.   -Anemia:  monitor hemoglobin and periodic iron studies  -Questionable Histoplasmosis in BM s/p 1 year of antifungal therapy completed April 2022. Being monitored off antifungal; BM results possibly artifactual. Previously followed with ID. -F/U with Neuro-Oncologist. Titrated off Briviact. -F/U with Rad-Onc Dr. Elkins for management of Brain metastases. For f/u MRI in Late June (3 month scan).  -Given the previous deep response in bone metastases, avoided the addition of a bone modifying agent - Follow-up in July with restaging scan obtained beforehand.  PET/CT is necessary to encompass all of her metastatic sites of disease given her treatment history  - Check out form given to patient with instructions for making appointments

## 2025-05-06 NOTE — ASSESSMENT
[Medication(s)] : Medication(s) [FreeTextEntry1] : Never-smoking woman with metastatic NSCLC with adenocarcinoma histology with ALK rearrangement. She was treated with gamma knife RT to brain metastasis in March 2019. She started systemic therapy with Alectinib in April 2019 and achieved [place]. Alectinib dose-reduced in late December 2020 due to renal function decline possibly due to anti-fungal agents for disseminated histoplasmosis. Started treatment with oral Posaconazole for histoplasmosis in mid-March 2021. Alectinib has been poorly tolerated in combination with Posaconazole, requiring dose-reductions of Alectinib. She was intolerant of the combination. Patient with sustained response on restaging PET/CT in June 2021. Alectinib was discontinued in late June 2021 due to poor tolerability in combination with posaconazole for disseminated histoplasmosis. Treated with 1 cycle of Pemetrexed chemotherapy on 7/9/21 with very poor tolerability. Further systemic therapy for lung cancer was held from July-October 2021. Restaging PET/CT October 2021 with disease progression. Restarted Alectinib 450mg BID on 10/9/21. She increased Alectinib to full dosing in late October 2021. Developed return of hemolysis in November and Alectinib permanently discontinued on 11/15/21 due to impression of drug-induced hemolytic anemia associated with this agent. Hemolysis resolved with discontinuation of offending agent (Alectinib). [facility] ct-DNA testing 11/19/21 with no evidence of ALK resistance mutations. Started 2nd line Brigatinib in late November 2021; achieved [place]. Restaging brain MRI 2/15/25 with mild increase in left medial precentral gyrus lesion - further imaging planned with Dr. Elkins and Dr. Alicia. Restaging CT C/A/P 3/8/25 showed increased size of osteosclerotic sacral lesion corresponding to area of increased FDG avidity on Nov 2024 scan.  Underwent RT to Sacrum region of oligo-progression in 6 Fx completed 4/15/25.   Recommend: -Continue treatment with 2nd line Brigatinib targeting her ALK rearrangement for as long as it benefits the patient, currently at 180mg PO daily. -Repeat labs today. Can monitor CEA; not clear if informative. Monitor CPK, amylase/lipase while on brigatinib.  No clear role/benefit for monitoring other tumor markers.   -Anemia:  monitor hemoglobin and periodic iron studies  -Questionable Histoplasmosis in BM s/p 1 year of antifungal therapy completed April 2022. Being monitored off antifungal; BM results possibly artifactual. Previously followed with ID. -F/U with Neuro-Oncologist. Titrated off Briviact. -F/U with Rad-Onc Dr. Elkins for management of Brain metastases. For f/u MRI in Late June (3 month scan).  -Given the previous deep response in bone metastases, avoided the addition of a bone modifying agent - Follow-up in July with restaging scan obtained beforehand.  PET/CT is necessary to encompass all of her metastatic sites of disease given her treatment history  - Check out form given to patient with instructions for making appointments

## 2025-05-06 NOTE — RESULTS/DATA
[FreeTextEntry1] : -PET/CT 10/4/19:  Right upper lobe mass, mediastinal and hilar lymph nodes appear significantly smaller and less intense than prior PET/CT from March 2019 and essentially unchanged from CT from June 2019.  There is a 5 mm right middle lobe nodule appearing more prominent than the prior study.  -CT Head 11/26/19:  No acute intracranial hemorrhage.  Vasogenic edema in the right frontoparietal region in area of known metastasis as seen on MR 10/1/2019.   -MRI Brain 11/27/19:  Enhancing lesion in the right frontal operculum just medial to the sylvian fissure is larger when compared to 10/1/2019 but is significantly smaller compared with 3/13/2019 and may represent neoplasm versus post therapy changes. No change tiny enhancing nodule left internal auditory canal.   -CT C/A/P 1/7/20:  Spiculated appearing mass in the right upper lobe, consistent with patient's known lung cancer. Comparison is made to a prior CT scan of the abdomen and pelvis which was performed on 2/18/2019. The sclerotic lesions in the left iliac bone were not seen on the prior exam. The sclerotic lesion in the right sacrum is also new. The lesion in the right iliac bone was seen previously.  The sclerotic lesion in the T11 vertebral body has significantly increased in size compared to the prior exam. These findings are concerning for progression of osseous metastasis.   -PET/CT 1/31/20:  Abnormal FDG-PET/CT scan.  1. Mildly FDG-avid spiculated right upper lobe lung mass is decreased in size and metabolism as compared to PET/CT dated 10/4/2019, and is not significantly changed as compared to CT dated 1/7/2020.  2. Resolution of small, mildly FDG-avid right paratracheal lymph node.  3. Non-FDG-avid sclerotic lesions, unchanged as compared to prior PET/CT, and interval diagnostic CT, are compatible with treated bone metastases.   -MRI Brain 2/27/20:  Similar-appearing enhancing right subinsular and right frontal opercular  lesions as detailed in the body the report. Decreased edema surrounding the larger right subinsular lesion. No abnormal leptomeningeal enhancement   -PET/CT 5/4/20:  Abnormal FDG-PET/CT scan.  1. Mildly FDG-avid spiculated right upper lobe lung mass is unchanged on CT, and minimally increased in metabolism as compared to prior study dated 1/31/2020. Etiology is indeterminate. Small focus of residual disease is not excluded.  2. New small hypermetabolic right perihilar and right paratracheal lymph nodes are nonspecific.  3. Non-FDG-avid sclerotic lesions, unchanged, compatible with treated bone metastases.   -MRI Brain 5/27/20:  Unchanged size of enhancing lesion in the right subinsular region with increased surrounding vasogenic edema. Continued close interval follow-up is recommended.   -MRI Brain 7/30/20:  Abnormal enhancing lesion with surrounding edema is again identified though decreased size and surrounding edema seen when compared prior exam. This finding could be compatible with response to therapy though continued close interval follow-up until resolution is recommended.   -PET/CT 8/10/20:  Compared to FDG-PET/CT scan dated 5/4/2020:  1. Mildly FDG-avid spiculated right upper lobe lung mass is unchanged in size and metabolism.  2. Nonspecific small FDG-avid mediastinal and bilateral hilar lymph nodes are unchanged or decreased in metabolism.  3. New approximately symmetric FDG activity in bilateral adrenal glands, without new corresponding abnormalities on CT, may be physiologic.  4. Non-FDG-avid sclerotic lesions, unchanged, compatible with treated bone metastases.   -CT Head 10/26/20:  No acute intracranial hemorrhage, mass effect, or evidence of acute vascular territorial infarction.  Previously described right frontal subinsular lesion is not well visualized on this examination.  More sensitive evaluation for intracranial metastatic disease, with contrast-enhanced brain MRI may be obtained, as clinically warranted, if no contraindications exist.  -MRI Brain 10/30/20:  Decreasing enhancement and surrounding FLAIR signal abnormality along the inferior right frontal lobe consistent with response to treatment. No new lesions are identified.  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    -PET/CT 11/13/20:  Compared to FDG-PET/CT scan dated 8/10/2020: 1. Mildly FDG-avid spiculated right upper lobe nodule is unchanged in size and metabolism. 2. Nonspecific small FDG-avid mediastinal and bilateral hilar lymph nodes, unchanged. 3. Mild, symmetric FDG activity in bilateral adrenal glands, without corresponding abnormalities on CT, unchanged likely physiologic. 4. New FDG-avid fractures, posterior aspect right 11th and 12th ribs. Correlate clinically for history of trauma. Few non-FDG-avid sclerotic lesions, unchanged, compatible with treated bone metastases.  -Abdominal U/S 12/4/20:  Etiology of patient's elevated LFTs is not elucidated on this study.  No hydronephrosis.  -PET/CT 2/12/21:  Compared to FDG-PET/CT scan dated 11/13/2020: 1. Mildly FDG-avid spiculated right upper lobe nodule is unchanged in size and metabolism, compatible with treated disease. 2. Few new small FDG-avid right axillary lymph nodes are nonspecific, probably reactive. Further evaluation may be performed with ultrasound. Nonspecific small FDG-avid mediastinal and bilateral hilar lymph nodes, unchanged in number and avidity. Right paratracheal node demonstrates new calcification. 3. Probable physiologic FDG activity, bilateral adrenal glands, unchanged. 4. Mild FDG activity associated with fractures in right 11th and 12th ribs, decreased as compared to prior study. Few non-FDG-avid sclerotic lesions, unchanged, compatible with treated bone metastases.  -MRI Brain 2/25/21:  Small focus of enhancement in the right frontal lobe without significant interval change from the most recent exam compatible with a treated metastasis.  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  -Pet/CT 6/21/21:  Compared to FDG-PET/CT scan dated 2/12/2021: 1. Mildly FDG avid spiculated right upper lobe nodule is unchanged in size and metabolism, compatible with treated disease. 2. Nonspecific new mild FDG avidity of bilateral level Ib cervical lymph nodes which have minimally increased in size. Please correlate clinically and with ultrasound for further evaluation as indicated. 3. Interval decrease in size and FDG avidity of nonspecific right axillary lymph nodes. Nonspecific small FDG avid mediastinal and bilateral hilar lymph nodes, unchanged in number and avidity. 4. Probable physiologic FDG activity, bilateral adrenal glands, not significantly changed. 5. Interval further decreased FDG activity associated with fractures in the right 11th-12th ribs. A few non-FDG avid sclerotic lesions, unchanged, compatible with treated bone metastasis. 6. Unchanged mild gastric hypermetabolism. Please correlate clinically for gastritis.  -EKG 6/25/21:  NSR with occasional PVC's   -MRI Brain 7/1/21: No new lesions are identified. Stable appearance of a previously treated metastatic lesion as described. Stable 3 mm enhancing lesion in the left internal auditory canal which likely represents a vestibular schwannoma.  -MRI C-spine 8/2/2021: Degenerative changes of the cervical spine with moderate central canal stenosis at C5-6.  No definite cord signal abnormality.  No abnormal enhancement or mass lesion seen.  -PET/CT 10/5/21:  Compared to FDG-PET/CT scan 6/21/2021: Findings suggestive of progression of disease. 1. Increased FDG avidity and unchanged size of spiculated right upper lobe pulmonary nodule. 2. New or increased FDG avid mediastinal and right hilar lymph nodes. 3. New FDG avidity associated with several unchanged small lytic and small sclerotic lesions in the pelvic bones. 4. Unchanged mild symmetrical FDG activity in bilateral adrenal glands, probably physiologic. 5. Unchanged mild gastric hypermetabolism, probably inflammatory. Please correlate clinically. 6. Unchanged nonspecific, mild fairly symmetric prominent FDG activity in the tonsillar regions, possibly inflammatory. Please correlate with direct visualization. Unchanged mildly FDG avid bilateral level Ib cervical lymph nodes, possibly reactive.  -MRI Brain 10/5/21:  There are 2 small enhancing lesions which are new since the prior exam of 7/1/2021 suspicious for metastasis. A 5 mm focus is in the right frontal lobe along the motor strip and a 3 mm focus is in the left frontal lobe in the subcortical white matter of the precentral gyrus.  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  -US Pelvis 11/3/21:  Nodular echogenic focus within the endometrium suspicious for polyp.  Unremarkable appearance of the ovaries  -[facility] 11/19[facility]:  No alterations identified.    -MRI Brain 12/5/21:  Abnormal enhancing lesions are again seen and slightly small in size which could be due to response to therapy. Continued close interval follow-up is recommended.  -CT Chest Angio 12/7/21 (COMPARED TO CT 1/7/20 AND NOT INTERVAL PET/CT'S):  No pulmonary embolus.  Right upper lobe mass similar to 01/07/2020.  Question of new metastasis to the right hilum.  Apparent skeletal metastases unchanged  -CT C/A/P 1/26/22:  Decreased size of a neoplasm in the right upper lobe.  Stable bone metastases. No finding to suggest new or progressive disease.  -Nuc Med Bone Scan 1/26/22:  Bone scan demonstrates:  No scan abnormality corresponding to sclerotic foci in the spine and pelvis.  FDG avid foci in the pelvis seen on prior PET/CT are not identified.  Degenerative disease in the major joints.  -MRI Brain 2/15/22:  Resolution of previously seen tiny metastatic foci within the precentral gyri.  Small enhancing focus within the right frontal operculum compatible with the sequelae of a treated metastasis, unchanged from the prior exams.  Stable left intracanalicular vestibular schwannoma.  -CT C/A/P 4/22/22:  Stable examination when compared to 1/26/2022, in particular stable spiculated mass in the right upper lobe at 1.5 cm.  Non specific 6 mm right lower paratracheal lymph node, attention on follow-up recommended.  Stable bony lesions concerning for metastatic disease.  No new metastatic disease.  -MRI Brain 5/14/22:   1. No evidence of recurrent metastatic disease. 2. A punctate enhancing focus and associated susceptibility artifact in the ventral posterior aspect of the right frontal lobe, compatible with sequela of treated metastasis, is stable. 3. 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  -CT C/A/P 7/22/22:  New patchy groundglass opacity in the right lower lobe, likely infectious/inflammatory. Enlarging indeterminate mediastinal and hilar lymphadenopathy. Follow-up chest CT in 3 months or as per clinical protocol.  Stable spiculated right apical nodule and osteosclerotic metastases.  No new disease in the abdomen or pelvis.  -[facility] 7/29/22:  No alterations identified.    -MRI Brain 8/19/22:  No evidence of intracranial metastatic disease. Resolution of previously seen precentral gyrus subcentimeter enhancing metastases. A focal area of T2 hyperintensity is seen in the region of the previously seen left-sided enhancing lesion likely representing the sequelae of prior radiation therapy. Continued follow-up is advised.  Stable left internal auditory canal vestibular schwannoma.  Stable area of enhancement with associated susceptibility artifact within the posterior right frontal lobe compatible with sequela of treated metastasis.  -CT C/A/P 10/21/22:  Since 7/22/2022:  Stable right upper lobe spiculated right upper lobe nodule.  Stable mildly enlarged hilar lymph nodes (although increased compared to 4/22/2022).  Stable sclerotic bone lesions.  No finding to suggest new or progressive disease.  -MRI Brain 11/23/22:   * STABLE VAGUE ENHANCEMENT, MILD INCREASED T2 SIGNAL, AND HEMOSIDERIN DEPOSITION POSTERIOR SUPERIOR ASPECT RIGHT INSULA AT SITE OF PRIOR TREATED METASTASIS. 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 * NO NEW ENHANCING LESIONS IDENTIFIED.  -CT C/A/P 1/20/23:  Ill-defined nodular opacity in the right upper lobe is unchanged when compared to previous exam.  No significant change in the sclerotic focus in the T11 vertebral body when compared to previous exam.  -MRI C/T-Spine 3/23/23:  No findings of osseous malignancy. No abnormal cord enhancement or cord signal abnormality.  -MRI L-Spine 3/23/23:  No findings of osseous or leptomeningeal malignancy.  -MRI Brain 3/23/23:   Similar minimal curvilinear enhancement along the posterior insula ([id number]). Similar minimal associated T2 FLAIR signal abnormality.  Minimally decreased size of previously seen focus of enhancement in the distal left IAC, currently 2.5 mm, previously 3 mm.  No new abnormal parenchymal or leptomeningeal enhancement.  -CT C/A/P 5/5/23:  Stable exam. No new disease in the chest, abdomen or pelvis.  Unchanged spiculated right apical nodule, sub-4 mm left upper lobe nodules and multiple osteosclerotic lesions.  -MRI Brain 7/24/23:  Stable vague enhancement, mild increased T2 signal, and hemosiderin deposition posterior superior aspect right insula at site of prior treated metastasis. Stable 3 mm vestibular schwannoma fundus left IAC. No new enhancing lesions identified.  -CT C/A/P 8/18/23:  Stable exam. Unchanged 1.1 cm right apical nodule, few scattered bilateral sub-4 mm pulmonary nodules, and osteosclerotic lesions.  -MRI Brain 12/1/23:  No new areas of abnormal enhancement. Increase in size of the cortical focus of increased T2 FLAIR signal within the posterior aspect of the left frontal lobe at site of a prior treated metastasis. No associated abnormal enhancement. New 4 x 3 mm region of increased T2 signal posterior left frontal lobe near the vertex with associated decreased T1 signal and without associated enhancement. Attention to these areas on follow-up recommended.  -CT C/A/P 12/1/23:  Stable 1.2 cm right apical spiculated nodular density with stable additional less than 0.4 cm nodules. No new or enlarging pulmonary nodules or consolidations. Stable appearance of sclerotic lesions throughout the visualized osseous structures. No new evidence of metastatic disease. Abnormal appearing endometrium in a patient postmenopausal, recommend pelvic ultrasound.  -TV U/S 1/3/24:  Thickened, cystic endometrium measuring up to 1.0 cm. Gynecologic consultation for tissue sampling is recommended.  -Screening Mammo/Sono 1/25/24:  No mammographic or sonographic evidence of malignancy.  -CT C/A/P 4/2/24:  Interval disease stability within the chest, abdomen, and pelvis compared to 12/1/2023:  Right apical 1.3 cm irregular lesion and right lower lobe pulmonary micronodule unchanged. Sclerotic osseous lesions unchanged. Similar-appearing thickening of the endometrium.  -MRI Brain 5/2/24:   1. No acute intracranial abnormality. No findings suspicious for new intracranial metastases. 2. Unchanged left vestibular schwannoma. 3. Mild interval progression of overall very mild white matter disease which may be the sequelae of treatment effect, chronic microvascular ischemic disease, vasculitis, chronic migraines, and/or Lyme disease among other possibilities. 4. Nonspecific progression of an ovoid T1 hypointense focus in the medial left parietal lobe. Attention on follow-up exams is advised.  -CT CAP 8/10/24:  Stable exam, with unchanged 1.2 cm right apical nodule, punctate posterior right lower lobe nodule and osteosclerotic lesions. Unchanged indeterminate endometrial thickening. No new disease in the chest, abdomen or pelvis.  -MRI head w/wo con 8/15/24:  Minimal increase in size of multiple FLAIR hyperintense, nonenhancing lesions in the bilateral precentral gyri and right medial frontoparietal white matter. Spectroscopy evaluation of the left medial precentral gyrus lesion is nonspecific. Lesions that are amenable to perfusion evaluation demonstrate no hyperperfusion. Evolving posttreatment changes are considered but continued follow-up is recommended.  -PET/CT 11/22/24:   1. Since 6/21/2021 FDG PET/CT, no significant interval change of the spiculated 1.8 cm right upper lobe lung nodule demonstrating mild FDG activity. No new hypermetabolic foci in the thorax. 2. Interval increased size of mildly avid RIGHT sclerotic sacral lesion, previously was nonavid. Other are non-FDG avid sclerotic lesions compatible for treated bone metastases are stable. 3. Persistent mild nonspecific activity within bilateral 1B cervical lymph nodes, may be inflammatory. 4. Stable nonspecific mildly avid mediastinal and bilateral hilar lymph nodes. Resolution of previously noted right hilar lymph noted activity. 5. Stable likely physiologic activity of bilateral adrenal glands. 6. Previously noted mild gastric hypermetabolism not visualized.  - MR brain w/wo contrast 2/15/25: Interval mild increase in 1.5 cm T2 FLAIR hyperintense, nonenhancing lesion in the left medial precentral gyrus compared with 8/15/2024. Additional T2 FLAIR hyperintense, nonenhancing lesions in the right medial frontoparietal white matter and the bilateral precentral gyri are not significantly changed compared to 8/15/2024. No new enhancing lesions identified.  - CT C/A/P with contrast 3/8/25: Enlarging right sacral osteosclerotic lesion (corresponding to the FDG avidity noted on recent PET/CT). Other osteosclerotic metastases appear grossly unchanged. Stable 1.6 cm right apical nodule and areas of mild interlobular septal thickening in the mid to lower lungs. Unchanged thickening of the adrenal glands. Unchanged endometrial thickening.  Images Reviewed/Interpreted:  -MRI Brain Spect 3/24/25:  T2/FLAIR hyperintense lesion involving the left sensorimotor region is grossly stable since MRI 2/15/2025. Spectroscopy and perfusion evaluation favors benign entity/posttreatment changes or low-grade neoplasm over active metastasis/high-grade neoplasm. Multiple additional similar but smaller lesions are also grossly stable throughout the bilateral cerebral hemispheres. A 0.3 cm left IAC enhancing lesion is suspicious for a schwannoma, stable.  -Mammo/Sono 3/29/25:  No mammographic or sonographic evidence of malignancy.

## 2025-05-06 NOTE — HISTORY OF PRESENT ILLNESS
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  She presented with symptomatic brain metastasis and was found to have a RUL primary tumor with intrathoracic LN, bone metastases, liver metastasis and brain metastasis.  Biopsy of the primary tumor in late Feb 2019 revealed her diagnosis.  She was treated with SRS to the brain in March 2019 with nice response.  She was started on first line Alectinib in April 2019 and achieved [place].    Patient is status post admission at [facility] from 11/27-12/4/2020 for treatment of disseminated histoplasmosis.  She was started on AmBisome with a plan to insert a PICC line however one of her blood cultures grew multiple organisms delaying the PICC line placement.  Subsequent fungal and bacterial cultures were negative.  During her hospital stay, she developed ANDREW which was felt to be secondary to the AmBisome however her alectinib was held during the hospital stay.  Abdominal ultrasound was unremarkable.  She was transfused 1 unit PRBCs prior to discharge.  She was discharged with plan to treat the histoplasmosis with oral posaconazole in the outpatient setting.  Patient has subsequently been on treatment with dose-reduced Alectinib 450mg BID since late December 2020 due to renal function.   She started Posaconazole in mid-March with the Alectinib.  Alectinib was stopped in late June 2021 due to poor tolerability with the combination.   Patient was started on Pemetrexed chemotherapy in July 2021 in effort to maintain control of her lung cancer while preserving sensitivity to ALK inhibitors and allowing her to be treated with Posaconazole for the disseminated histo.  She received 1 cycle of chemotherapy with Pemetrexed that was very poorly tolerated.  She completed roughly 1 year of Posaconazole in late April 2022.   She had repeat BM Bx in Sept 2021 showing evidence of persistent histoplasmosis and was continued on Posaconazole. Restaging PET/CT in early October with evidence of disease progression.  She restarted Alectinib 450mg BID on 10/9/21.   MRI Brain on 10/5/21 revealed two new small metastases; she had f/u with Rad-Onc and given her asymptomatic status coupled with restarting the Alectinib, the plan was to do a short-term f/u MRI at a 2 month interval in early December.    Developed return of hemolysis in November 2021.  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  Alectinib discontinued in mid-November 2021.   Started 2nd line Brigatinib in November 2021; achieved [place].  [facility] testing in July 2022 without any alterations.   Patient's case was reviewed at thoracic tumor board in December 2024: There is felt to be no clear role for the inclusion of consolidative thoracic RT at this juncture. Treated with RT to region of oligo-progression in sacrum completed April 2024.  [de-identified] : -Lung, RUL CT-guided FNA 2/26/19:  Positive for malignant cells.  Adenocarcinoma.  PDL1 positive at 5%.  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  [de-identified] : Patient continues on second line systemic therapy with brigatinib targeting her EML4-ALK fusion since Nov 2021; achieved [place].   Patient presents today with her  for f/u.  Denies significant side effects.  No F/C/N/V/D or constipation.  Occasional soreness to the left shoulder/left upper back area.  Underwent RT to Sacrum region of oligo-progression in 6 Fx completed 4/15/25.   She continues to work and is exercising.

## 2025-05-06 NOTE — PHYSICAL EXAM
[Fully active, able to carry on all pre-disease performance without restriction] : Status 0 - Fully active, able to carry on all pre-disease performance without restriction [Normal] : affect appropriate [de-identified] : No icterus  [de-identified] : No LAD [de-identified] : Clear [de-identified] : S1 S2 [de-identified] : No edema [de-identified] : No spine/CVA tenderness [de-identified] : Ambulatory

## 2025-05-06 NOTE — RESULTS/DATA
[FreeTextEntry1] : -PET/CT 10/4/19:  Right upper lobe mass, mediastinal and hilar lymph nodes appear significantly smaller and less intense than prior PET/CT from March 2019 and essentially unchanged from CT from June 2019.  There is a 5 mm right middle lobe nodule appearing more prominent than the prior study.  -CT Head 11/26/19:  No acute intracranial hemorrhage.  Vasogenic edema in the right frontoparietal region in area of known metastasis as seen on MR 10/1/2019.   -MRI Brain 11/27/19:  Enhancing lesion in the right frontal operculum just medial to the sylvian fissure is larger when compared to 10/1/2019 but is significantly smaller compared with 3/13/2019 and may represent neoplasm versus post therapy changes. No change tiny enhancing nodule left internal auditory canal.   -CT C/A/P 1/7/20:  Spiculated appearing mass in the right upper lobe, consistent with patient's known lung cancer. Comparison is made to a prior CT scan of the abdomen and pelvis which was performed on 2/18/2019. The sclerotic lesions in the left iliac bone were not seen on the prior exam. The sclerotic lesion in the right sacrum is also new. The lesion in the right iliac bone was seen previously.  The sclerotic lesion in the T11 vertebral body has significantly increased in size compared to the prior exam. These findings are concerning for progression of osseous metastasis.   -PET/CT 1/31/20:  Abnormal FDG-PET/CT scan.  1. Mildly FDG-avid spiculated right upper lobe lung mass is decreased in size and metabolism as compared to PET/CT dated 10/4/2019, and is not significantly changed as compared to CT dated 1/7/2020.  2. Resolution of small, mildly FDG-avid right paratracheal lymph node.  3. Non-FDG-avid sclerotic lesions, unchanged as compared to prior PET/CT, and interval diagnostic CT, are compatible with treated bone metastases.   -MRI Brain 2/27/20:  Similar-appearing enhancing right subinsular and right frontal opercular  lesions as detailed in the body the report. Decreased edema surrounding the larger right subinsular lesion. No abnormal leptomeningeal enhancement   -PET/CT 5/4/20:  Abnormal FDG-PET/CT scan.  1. Mildly FDG-avid spiculated right upper lobe lung mass is unchanged on CT, and minimally increased in metabolism as compared to prior study dated 1/31/2020. Etiology is indeterminate. Small focus of residual disease is not excluded.  2. New small hypermetabolic right perihilar and right paratracheal lymph nodes are nonspecific.  3. Non-FDG-avid sclerotic lesions, unchanged, compatible with treated bone metastases.   -MRI Brain 5/27/20:  Unchanged size of enhancing lesion in the right subinsular region with increased surrounding vasogenic edema. Continued close interval follow-up is recommended.   -MRI Brain 7/30/20:  Abnormal enhancing lesion with surrounding edema is again identified though decreased size and surrounding edema seen when compared prior exam. This finding could be compatible with response to therapy though continued close interval follow-up until resolution is recommended.   -PET/CT 8/10/20:  Compared to FDG-PET/CT scan dated 5/4/2020:  1. Mildly FDG-avid spiculated right upper lobe lung mass is unchanged in size and metabolism.  2. Nonspecific small FDG-avid mediastinal and bilateral hilar lymph nodes are unchanged or decreased in metabolism.  3. New approximately symmetric FDG activity in bilateral adrenal glands, without new corresponding abnormalities on CT, may be physiologic.  4. Non-FDG-avid sclerotic lesions, unchanged, compatible with treated bone metastases.   -CT Head 10/26/20:  No acute intracranial hemorrhage, mass effect, or evidence of acute vascular territorial infarction.  Previously described right frontal subinsular lesion is not well visualized on this examination.  More sensitive evaluation for intracranial metastatic disease, with contrast-enhanced brain MRI may be obtained, as clinically warranted, if no contraindications exist.  -MRI Brain 10/30/20:  Decreasing enhancement and surrounding FLAIR signal abnormality along the inferior right frontal lobe consistent with response to treatment. No new lesions are identified.  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    -PET/CT 11/13/20:  Compared to FDG-PET/CT scan dated 8/10/2020: 1. Mildly FDG-avid spiculated right upper lobe nodule is unchanged in size and metabolism. 2. Nonspecific small FDG-avid mediastinal and bilateral hilar lymph nodes, unchanged. 3. Mild, symmetric FDG activity in bilateral adrenal glands, without corresponding abnormalities on CT, unchanged likely physiologic. 4. New FDG-avid fractures, posterior aspect right 11th and 12th ribs. Correlate clinically for history of trauma. Few non-FDG-avid sclerotic lesions, unchanged, compatible with treated bone metastases.  -Abdominal U/S 12/4/20:  Etiology of patient's elevated LFTs is not elucidated on this study.  No hydronephrosis.  -PET/CT 2/12/21:  Compared to FDG-PET/CT scan dated 11/13/2020: 1. Mildly FDG-avid spiculated right upper lobe nodule is unchanged in size and metabolism, compatible with treated disease. 2. Few new small FDG-avid right axillary lymph nodes are nonspecific, probably reactive. Further evaluation may be performed with ultrasound. Nonspecific small FDG-avid mediastinal and bilateral hilar lymph nodes, unchanged in number and avidity. Right paratracheal node demonstrates new calcification. 3. Probable physiologic FDG activity, bilateral adrenal glands, unchanged. 4. Mild FDG activity associated with fractures in right 11th and 12th ribs, decreased as compared to prior study. Few non-FDG-avid sclerotic lesions, unchanged, compatible with treated bone metastases.  -MRI Brain 2/25/21:  Small focus of enhancement in the right frontal lobe without significant interval change from the most recent exam compatible with a treated metastasis.  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  -Pet/CT 6/21/21:  Compared to FDG-PET/CT scan dated 2/12/2021: 1. Mildly FDG avid spiculated right upper lobe nodule is unchanged in size and metabolism, compatible with treated disease. 2. Nonspecific new mild FDG avidity of bilateral level Ib cervical lymph nodes which have minimally increased in size. Please correlate clinically and with ultrasound for further evaluation as indicated. 3. Interval decrease in size and FDG avidity of nonspecific right axillary lymph nodes. Nonspecific small FDG avid mediastinal and bilateral hilar lymph nodes, unchanged in number and avidity. 4. Probable physiologic FDG activity, bilateral adrenal glands, not significantly changed. 5. Interval further decreased FDG activity associated with fractures in the right 11th-12th ribs. A few non-FDG avid sclerotic lesions, unchanged, compatible with treated bone metastasis. 6. Unchanged mild gastric hypermetabolism. Please correlate clinically for gastritis.  -EKG 6/25/21:  NSR with occasional PVC's   -MRI Brain 7/1/21: No new lesions are identified. Stable appearance of a previously treated metastatic lesion as described. Stable 3 mm enhancing lesion in the left internal auditory canal which likely represents a vestibular schwannoma.  -MRI C-spine 8/2/2021: Degenerative changes of the cervical spine with moderate central canal stenosis at C5-6.  No definite cord signal abnormality.  No abnormal enhancement or mass lesion seen.  -PET/CT 10/5/21:  Compared to FDG-PET/CT scan 6/21/2021: Findings suggestive of progression of disease. 1. Increased FDG avidity and unchanged size of spiculated right upper lobe pulmonary nodule. 2. New or increased FDG avid mediastinal and right hilar lymph nodes. 3. New FDG avidity associated with several unchanged small lytic and small sclerotic lesions in the pelvic bones. 4. Unchanged mild symmetrical FDG activity in bilateral adrenal glands, probably physiologic. 5. Unchanged mild gastric hypermetabolism, probably inflammatory. Please correlate clinically. 6. Unchanged nonspecific, mild fairly symmetric prominent FDG activity in the tonsillar regions, possibly inflammatory. Please correlate with direct visualization. Unchanged mildly FDG avid bilateral level Ib cervical lymph nodes, possibly reactive.  -MRI Brain 10/5/21:  There are 2 small enhancing lesions which are new since the prior exam of 7/1/2021 suspicious for metastasis. A 5 mm focus is in the right frontal lobe along the motor strip and a 3 mm focus is in the left frontal lobe in the subcortical white matter of the precentral gyrus.  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  -US Pelvis 11/3/21:  Nodular echogenic focus within the endometrium suspicious for polyp.  Unremarkable appearance of the ovaries  -[facility] 11/19/21:  No alterations identified.    -MRI Brain 12/5/21:  Abnormal enhancing lesions are again seen and slightly small in size which could be due to response to therapy. Continued close interval follow-up is recommended.  -CT Chest Angio 12/7/21 (COMPARED TO CT 1/7/20 AND NOT INTERVAL PET/CT'S):  No pulmonary embolus.  Right upper lobe mass similar to 01/07/2020.  Question of new metastasis to the right hilum.  Apparent skeletal metastases unchanged  -CT C/A/P 1/26/22:  Decreased size of a neoplasm in the right upper lobe.  Stable bone metastases. No finding to suggest new or progressive disease.  -Nuc Med Bone Scan 1/26/22:  Bone scan demonstrates:  No scan abnormality corresponding to sclerotic foci in the spine and pelvis.  FDG avid foci in the pelvis seen on prior PET/CT are not identified.  Degenerative disease in the major joints.  -MRI Brain 2/15/22:  Resolution of previously seen tiny metastatic foci within the precentral gyri.  Small enhancing focus within the right frontal operculum compatible with the sequelae of a treated metastasis, unchanged from the prior exams.  Stable left intracanalicular vestibular schwannoma.  -CT C/A/P 4/22/22:  Stable examination when compared to 1/26/2022, in particular stable spiculated mass in the right upper lobe at 1.5 cm.  Non specific 6 mm right lower paratracheal lymph node, attention on follow-up recommended.  Stable bony lesions concerning for metastatic disease.  No new metastatic disease.  -MRI Brain 5/14/22:   1. No evidence of recurrent metastatic disease. 2. A punctate enhancing focus and associated susceptibility artifact in the ventral posterior aspect of the right frontal lobe, compatible with sequela of treated metastasis, is stable. 3. 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  -CT C/A/P 7/22/22:  New patchy groundglass opacity in the right lower lobe, likely infectious/inflammatory. Enlarging indeterminate mediastinal and hilar lymphadenopathy. Follow-up chest CT in 3 months or as per clinical protocol.  Stable spiculated right apical nodule and osteosclerotic metastases.  No new disease in the abdomen or pelvis.  -[facility] 7/29/22:  No alterations identified.    -MRI Brain 8/19/22:  No evidence of intracranial metastatic disease. Resolution of previously seen precentral gyrus subcentimeter enhancing metastases. A focal area of T2 hyperintensity is seen in the region of the previously seen left-sided enhancing lesion likely representing the sequelae of prior radiation therapy. Continued follow-up is advised.  Stable left internal auditory canal vestibular schwannoma.  Stable area of enhancement with associated susceptibility artifact within the posterior right frontal lobe compatible with sequela of treated metastasis.  -CT C/A/P 10/21/22:  Since 7/22/2022:  Stable right upper lobe spiculated right upper lobe nodule.  Stable mildly enlarged hilar lymph nodes (although increased compared to 4/22/2022).  Stable sclerotic bone lesions.  No finding to suggest new or progressive disease.  -MRI Brain 11/23/22:   * STABLE VAGUE ENHANCEMENT, MILD INCREASED T2 SIGNAL, AND HEMOSIDERIN DEPOSITION POSTERIOR SUPERIOR ASPECT RIGHT INSULA AT SITE OF PRIOR TREATED METASTASIS. 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 * NO NEW ENHANCING LESIONS IDENTIFIED.  -CT C/A/P 1/20/23:  Ill-defined nodular opacity in the right upper lobe is unchanged when compared to previous exam.  No significant change in the sclerotic focus in the T11 vertebral body when compared to previous exam.  -MRI C/T-Spine 3/23/23:  No findings of osseous malignancy. No abnormal cord enhancement or cord signal abnormality.  -MRI L-Spine 3/23/23:  No findings of osseous or leptomeningeal malignancy.  -MRI Brain 3/23/23:   Similar minimal curvilinear enhancement along the posterior insula ([id number]). Similar minimal associated T2 FLAIR signal abnormality.  Minimally decreased size of previously seen focus of enhancement in the distal left IAC, currently 2.5 mm, previously 3 mm.  No new abnormal parenchymal or leptomeningeal enhancement.  -CT C/A/P 5/5/23:  Stable exam. No new disease in the chest, abdomen or pelvis.  Unchanged spiculated right apical nodule, sub-4 mm left upper lobe nodules and multiple osteosclerotic lesions.  -MRI Brain 7/24/23:  Stable vague enhancement, mild increased T2 signal, and hemosiderin deposition posterior superior aspect right insula at site of prior treated metastasis. Stable 3 mm vestibular schwannoma fundus left IAC. No new enhancing lesions identified.  -CT C/A/P 8/18/23:  Stable exam. Unchanged 1.1 cm right apical nodule, few scattered bilateral sub-4 mm pulmonary nodules, and osteosclerotic lesions.  -MRI Brain 12/1/23:  No new areas of abnormal enhancement. Increase in size of the cortical focus of increased T2 FLAIR signal within the posterior aspect of the left frontal lobe at site of a prior treated metastasis. No associated abnormal enhancement. New 4 x 3 mm region of increased T2 signal posterior left frontal lobe near the vertex with associated decreased T1 signal and without associated enhancement. Attention to these areas on follow-up recommended.  -CT C/A/P 12/1/23:  Stable 1.2 cm right apical spiculated nodular density with stable additional less than 0.4 cm nodules. No new or enlarging pulmonary nodules or consolidations. Stable appearance of sclerotic lesions throughout the visualized osseous structures. No new evidence of metastatic disease. Abnormal appearing endometrium in a patient postmenopausal, recommend pelvic ultrasound.  -TV U/S 1/3/24:  Thickened, cystic endometrium measuring up to 1.0 cm. Gynecologic consultation for tissue sampling is recommended.  -Screening Mammo/Sono 1/25/24:  No mammographic or sonographic evidence of malignancy.  -CT C/A/P 4/2/24:  Interval disease stability within the chest, abdomen, and pelvis compared to 12/1/2023:  Right apical 1.3 cm irregular lesion and right lower lobe pulmonary micronodule unchanged. Sclerotic osseous lesions unchanged. Similar-appearing thickening of the endometrium.  -MRI Brain 5/2/24:   1. No acute intracranial abnormality. No findings suspicious for new intracranial metastases. 2. Unchanged left vestibular schwannoma. 3. Mild interval progression of overall very mild white matter disease which may be the sequelae of treatment effect, chronic microvascular ischemic disease, vasculitis, chronic migraines, and/or Lyme disease among other possibilities. 4. Nonspecific progression of an ovoid T1 hypointense focus in the medial left parietal lobe. Attention on follow-up exams is advised.  -CT CAP 8/10/24:  Stable exam, with unchanged 1.2 cm right apical nodule, punctate posterior right lower lobe nodule and osteosclerotic lesions. Unchanged indeterminate endometrial thickening. No new disease in the chest, abdomen or pelvis.  -MRI head w/wo con 8/15/24:  Minimal increase in size of multiple FLAIR hyperintense, nonenhancing lesions in the bilateral precentral gyri and right medial frontoparietal white matter. Spectroscopy evaluation of the left medial precentral gyrus lesion is nonspecific. Lesions that are amenable to perfusion evaluation demonstrate no hyperperfusion. Evolving posttreatment changes are considered but continued follow-up is recommended.  -PET/CT 11/22/24:   1. Since 6/21/2021 FDG PET/CT, no significant interval change of the spiculated 1.8 cm right upper lobe lung nodule demonstrating mild FDG activity. No new hypermetabolic foci in the thorax. 2. Interval increased size of mildly avid RIGHT sclerotic sacral lesion, previously was nonavid. Other are non-FDG avid sclerotic lesions compatible for treated bone metastases are stable. 3. Persistent mild nonspecific activity within bilateral 1B cervical lymph nodes, may be inflammatory. 4. Stable nonspecific mildly avid mediastinal and bilateral hilar lymph nodes. Resolution of previously noted right hilar lymph noted activity. 5. Stable likely physiologic activity of bilateral adrenal glands. 6. Previously noted mild gastric hypermetabolism not visualized.  - MR brain w/wo contrast 2/15/25: Interval mild increase in 1.5 cm T2 FLAIR hyperintense, nonenhancing lesion in the left medial precentral gyrus compared with 8/15/2024. Additional T2 FLAIR hyperintense, nonenhancing lesions in the right medial frontoparietal white matter and the bilateral precentral gyri are not significantly changed compared to 8/15/2024. No new enhancing lesions identified.  - CT C/A/P with contrast 3/8/25: Enlarging right sacral osteosclerotic lesion (corresponding to the FDG avidity noted on recent PET/CT). Other osteosclerotic metastases appear grossly unchanged. Stable 1.6 cm right apical nodule and areas of mild interlobular septal thickening in the mid to lower lungs. Unchanged thickening of the adrenal glands. Unchanged endometrial thickening.  Images Reviewed/Interpreted:  -MRI Brain Spect 3/24/25:  T2/FLAIR hyperintense lesion involving the left sensorimotor region is grossly stable since MRI 2/15/2025. Spectroscopy and perfusion evaluation favors benign entity/posttreatment changes or low-grade neoplasm over active metastasis/high-grade neoplasm. Multiple additional similar but smaller lesions are also grossly stable throughout the bilateral cerebral hemispheres. A 0.3 cm left IAC enhancing lesion is suspicious for a schwannoma, stable.  -Mammo/Sono 3/29/25:  No mammographic or sonographic evidence of malignancy.

## 2025-05-06 NOTE — HISTORY OF PRESENT ILLNESS
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  She presented with symptomatic brain metastasis and was found to have a RUL primary tumor with intrathoracic LN, bone metastases, liver metastasis and brain metastasis.  Biopsy of the primary tumor in late Feb 2019 revealed her diagnosis.  She was treated with SRS to the brain in March 2019 with nice response.  She was started on first line Alectinib in April 2019 and achieved NH.    Patient is status post admission at [facility] from 11/27-12/4/2020 for treatment of disseminated histoplasmosis.  She was started on AmBisome with a plan to insert a PICC line however one of her blood cultures grew multiple organisms delaying the PICC line placement.  Subsequent fungal and bacterial cultures were negative.  During her hospital stay, she developed ANDREW which was felt to be secondary to the AmBisome however her alectinib was held during the hospital stay.  Abdominal ultrasound was unremarkable.  She was transfused 1 unit PRBCs prior to discharge.  She was discharged with plan to treat the histoplasmosis with oral posaconazole in the outpatient setting.  Patient has subsequently been on treatment with dose-reduced Alectinib 450mg BID since late December 2020 due to renal function.   She started Posaconazole in mid-March with the Alectinib.  Alectinib was stopped in late June 2021 due to poor tolerability with the combination.   Patient was started on Pemetrexed chemotherapy in July 2021 in effort to maintain control of her lung cancer while preserving sensitivity to ALK inhibitors and allowing her to be treated with Posaconazole for the disseminated histo.  She received 1 cycle of chemotherapy with Pemetrexed that was very poorly tolerated.  She completed roughly 1 year of Posaconazole in late April 2022.   She had repeat BM Bx in Sept 2021 showing evidence of persistent histoplasmosis and was continued on Posaconazole. Restaging PET/CT in early October with evidence of disease progression.  She restarted Alectinib 450mg BID on 10/9/21.   MRI Brain on 10/5/21 revealed two new small metastases; she had f/u with Rad-Onc and given her asymptomatic status coupled with restarting the Alectinib, the plan was to do a short-term f/u MRI at a 2 month interval in early December.    Developed return of hemolysis in November 2021.  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  Alectinib discontinued in mid-November 2021.   Started 2nd line Brigatinib in November 2021; achieved NH.  [facility] testing in July 2022 without any alterations.   Patient's case was reviewed at thoracic tumor board in December 2024: There is felt to be no clear role for the inclusion of consolidative thoracic RT at this juncture. Treated with RT to region of oligo-progression in sacrum completed April 2024.  [de-identified] : -Lung, RUL CT-guided FNA 2/26/19:  Positive for malignant cells.  Adenocarcinoma.  PDL1 positive at 5%.  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  [de-identified] : Patient continues on second line systemic therapy with brigatinib targeting her EML4-ALK fusion since Nov 2021; achieved DE.   Patient presents today with her  for f/u.  Denies significant side effects.  No F/C/N/V/D or constipation.  Occasional soreness to the left shoulder/left upper back area.  Underwent RT to Sacrum region of oligo-progression in 6 Fx completed 4/15/25.   She continues to work and is exercising.

## 2025-05-06 NOTE — PHYSICAL EXAM
[Fully active, able to carry on all pre-disease performance without restriction] : Status 0 - Fully active, able to carry on all pre-disease performance without restriction [Normal] : affect appropriate [de-identified] : No icterus  [de-identified] : No LAD [de-identified] : Clear [de-identified] : S1 S2 [de-identified] : No edema [de-identified] : Ambulatory [de-identified] : No spine/CVA tenderness

## 2025-05-06 NOTE — RESULTS/DATA
[FreeTextEntry1] : -PET/CT 10/4/19:  Right upper lobe mass, mediastinal and hilar lymph nodes appear significantly smaller and less intense than prior PET/CT from March 2019 and essentially unchanged from CT from June 2019.  There is a 5 mm right middle lobe nodule appearing more prominent than the prior study.  -CT Head 11/26/19:  No acute intracranial hemorrhage.  Vasogenic edema in the right frontoparietal region in area of known metastasis as seen on MR 10/1/2019.   -MRI Brain 11/27/19:  Enhancing lesion in the right frontal operculum just medial to the sylvian fissure is larger when compared to 10/1/2019 but is significantly smaller compared with 3/13/2019 and may represent neoplasm versus post therapy changes. No change tiny enhancing nodule left internal auditory canal.   -CT C/A/P 1/7/20:  Spiculated appearing mass in the right upper lobe, consistent with patient's known lung cancer. Comparison is made to a prior CT scan of the abdomen and pelvis which was performed on 2/18/2019. The sclerotic lesions in the left iliac bone were not seen on the prior exam. The sclerotic lesion in the right sacrum is also new. The lesion in the right iliac bone was seen previously.  The sclerotic lesion in the T11 vertebral body has significantly increased in size compared to the prior exam. These findings are concerning for progression of osseous metastasis.   -PET/CT 1/31/20:  Abnormal FDG-PET/CT scan.  1. Mildly FDG-avid spiculated right upper lobe lung mass is decreased in size and metabolism as compared to PET/CT dated 10/4/2019, and is not significantly changed as compared to CT dated 1/7/2020.  2. Resolution of small, mildly FDG-avid right paratracheal lymph node.  3. Non-FDG-avid sclerotic lesions, unchanged as compared to prior PET/CT, and interval diagnostic CT, are compatible with treated bone metastases.   -MRI Brain 2/27/20:  Similar-appearing enhancing right subinsular and right frontal opercular  lesions as detailed in the body the report. Decreased edema surrounding the larger right subinsular lesion. No abnormal leptomeningeal enhancement   -PET/CT 5/4/20:  Abnormal FDG-PET/CT scan.  1. Mildly FDG-avid spiculated right upper lobe lung mass is unchanged on CT, and minimally increased in metabolism as compared to prior study dated 1/31/2020. Etiology is indeterminate. Small focus of residual disease is not excluded.  2. New small hypermetabolic right perihilar and right paratracheal lymph nodes are nonspecific.  3. Non-FDG-avid sclerotic lesions, unchanged, compatible with treated bone metastases.   -MRI Brain 5/27/20:  Unchanged size of enhancing lesion in the right subinsular region with increased surrounding vasogenic edema. Continued close interval follow-up is recommended.   -MRI Brain 7/30/20:  Abnormal enhancing lesion with surrounding edema is again identified though decreased size and surrounding edema seen when compared prior exam. This finding could be compatible with response to therapy though continued close interval follow-up until resolution is recommended.   -PET/CT 8/10/20:  Compared to FDG-PET/CT scan dated 5/4/2020:  1. Mildly FDG-avid spiculated right upper lobe lung mass is unchanged in size and metabolism.  2. Nonspecific small FDG-avid mediastinal and bilateral hilar lymph nodes are unchanged or decreased in metabolism.  3. New approximately symmetric FDG activity in bilateral adrenal glands, without new corresponding abnormalities on CT, may be physiologic.  4. Non-FDG-avid sclerotic lesions, unchanged, compatible with treated bone metastases.   -CT Head 10/26/20:  No acute intracranial hemorrhage, mass effect, or evidence of acute vascular territorial infarction.  Previously described right frontal subinsular lesion is not well visualized on this examination.  More sensitive evaluation for intracranial metastatic disease, with contrast-enhanced brain MRI may be obtained, as clinically warranted, if no contraindications exist.  -MRI Brain 10/30/20:  Decreasing enhancement and surrounding FLAIR signal abnormality along the inferior right frontal lobe consistent with response to treatment. No new lesions are identified.  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    -PET/CT 11/13/20:  Compared to FDG-PET/CT scan dated 8/10/2020: 1. Mildly FDG-avid spiculated right upper lobe nodule is unchanged in size and metabolism. 2. Nonspecific small FDG-avid mediastinal and bilateral hilar lymph nodes, unchanged. 3. Mild, symmetric FDG activity in bilateral adrenal glands, without corresponding abnormalities on CT, unchanged likely physiologic. 4. New FDG-avid fractures, posterior aspect right 11th and 12th ribs. Correlate clinically for history of trauma. Few non-FDG-avid sclerotic lesions, unchanged, compatible with treated bone metastases.  -Abdominal U/S 12/4/20:  Etiology of patient's elevated LFTs is not elucidated on this study.  No hydronephrosis.  -PET/CT 2/12/21:  Compared to FDG-PET/CT scan dated 11/13/2020: 1. Mildly FDG-avid spiculated right upper lobe nodule is unchanged in size and metabolism, compatible with treated disease. 2. Few new small FDG-avid right axillary lymph nodes are nonspecific, probably reactive. Further evaluation may be performed with ultrasound. Nonspecific small FDG-avid mediastinal and bilateral hilar lymph nodes, unchanged in number and avidity. Right paratracheal node demonstrates new calcification. 3. Probable physiologic FDG activity, bilateral adrenal glands, unchanged. 4. Mild FDG activity associated with fractures in right 11th and 12th ribs, decreased as compared to prior study. Few non-FDG-avid sclerotic lesions, unchanged, compatible with treated bone metastases.  -MRI Brain 2/25/21:  Small focus of enhancement in the right frontal lobe without significant interval change from the most recent exam compatible with a treated metastasis.  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  -Pet/CT 6/21/21:  Compared to FDG-PET/CT scan dated 2/12/2021: 1. Mildly FDG avid spiculated right upper lobe nodule is unchanged in size and metabolism, compatible with treated disease. 2. Nonspecific new mild FDG avidity of bilateral level Ib cervical lymph nodes which have minimally increased in size. Please correlate clinically and with ultrasound for further evaluation as indicated. 3. Interval decrease in size and FDG avidity of nonspecific right axillary lymph nodes. Nonspecific small FDG avid mediastinal and bilateral hilar lymph nodes, unchanged in number and avidity. 4. Probable physiologic FDG activity, bilateral adrenal glands, not significantly changed. 5. Interval further decreased FDG activity associated with fractures in the right 11th-12th ribs. A few non-FDG avid sclerotic lesions, unchanged, compatible with treated bone metastasis. 6. Unchanged mild gastric hypermetabolism. Please correlate clinically for gastritis.  -EKG 6/25/21:  NSR with occasional PVC's   -MRI Brain 7/1/21: No new lesions are identified. Stable appearance of a previously treated metastatic lesion as described. Stable 3 mm enhancing lesion in the left internal auditory canal which likely represents a vestibular schwannoma.  -MRI C-spine 8/2/2021: Degenerative changes of the cervical spine with moderate central canal stenosis at C5-6.  No definite cord signal abnormality.  No abnormal enhancement or mass lesion seen.  -PET/CT 10/5/21:  Compared to FDG-PET/CT scan 6/21/2021: Findings suggestive of progression of disease. 1. Increased FDG avidity and unchanged size of spiculated right upper lobe pulmonary nodule. 2. New or increased FDG avid mediastinal and right hilar lymph nodes. 3. New FDG avidity associated with several unchanged small lytic and small sclerotic lesions in the pelvic bones. 4. Unchanged mild symmetrical FDG activity in bilateral adrenal glands, probably physiologic. 5. Unchanged mild gastric hypermetabolism, probably inflammatory. Please correlate clinically. 6. Unchanged nonspecific, mild fairly symmetric prominent FDG activity in the tonsillar regions, possibly inflammatory. Please correlate with direct visualization. Unchanged mildly FDG avid bilateral level Ib cervical lymph nodes, possibly reactive.  -MRI Brain 10/5/21:  There are 2 small enhancing lesions which are new since the prior exam of 7/1/2021 suspicious for metastasis. A 5 mm focus is in the right frontal lobe along the motor strip and a 3 mm focus is in the left frontal lobe in the subcortical white matter of the precentral gyrus.  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  -US Pelvis 11/3/21:  Nodular echogenic focus within the endometrium suspicious for polyp.  Unremarkable appearance of the ovaries  -[facility] 11/19/21:  No alterations identified.    -MRI Brain 12/5/21:  Abnormal enhancing lesions are again seen and slightly small in size which could be due to response to therapy. Continued close interval follow-up is recommended.  -CT Chest Angio 12/7/21 (COMPARED TO CT 1/7/20 AND NOT INTERVAL PET/CT'S):  No pulmonary embolus.  Right upper lobe mass similar to 01/07/2020.  Question of new metastasis to the right hilum.  Apparent skeletal metastases unchanged  -CT C/A/P 1/26/22:  Decreased size of a neoplasm in the right upper lobe.  Stable bone metastases. No finding to suggest new or progressive disease.  -Nuc Med Bone Scan 1/26/22:  Bone scan demonstrates:  No scan abnormality corresponding to sclerotic foci in the spine and pelvis.  FDG avid foci in the pelvis seen on prior PET/CT are not identified.  Degenerative disease in the major joints.  -MRI Brain 2/15/22:  Resolution of previously seen tiny metastatic foci within the precentral gyri.  Small enhancing focus within the right frontal operculum compatible with the sequelae of a treated metastasis, unchanged from the prior exams.  Stable left intracanalicular vestibular schwannoma.  -CT C/A/P 4/22/22:  Stable examination when compared to 1/26/2022, in particular stable spiculated mass in the right upper lobe at 1.5 cm.  Non specific 6 mm right lower paratracheal lymph node, attention on follow-up recommended.  Stable bony lesions concerning for metastatic disease.  No new metastatic disease.  -MRI Brain 5/14/22:   1. No evidence of recurrent metastatic disease. 2. A punctate enhancing focus and associated susceptibility artifact in the ventral posterior aspect of the right frontal lobe, compatible with sequela of treated metastasis, is stable. 3. 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  -CT C/A/P 7/22/22:  New patchy groundglass opacity in the right lower lobe, likely infectious/inflammatory. Enlarging indeterminate mediastinal and hilar lymphadenopathy. Follow-up chest CT in 3 months or as per clinical protocol.  Stable spiculated right apical nodule and osteosclerotic metastases.  No new disease in the abdomen or pelvis.  -[facility] 7/29/22:  No alterations identified.    -MRI Brain 8/19/22:  No evidence of intracranial metastatic disease. Resolution of previously seen precentral gyrus subcentimeter enhancing metastases. A focal area of T2 hyperintensity is seen in the region of the previously seen left-sided enhancing lesion likely representing the sequelae of prior radiation therapy. Continued follow-up is advised.  Stable left internal auditory canal vestibular schwannoma.  Stable area of enhancement with associated susceptibility artifact within the posterior right frontal lobe compatible with sequela of treated metastasis.  -CT C/A/P 10/21/22:  Since 7/22/2022:  Stable right upper lobe spiculated right upper lobe nodule.  Stable mildly enlarged hilar lymph nodes (although increased compared to 4/22/2022).  Stable sclerotic bone lesions.  No finding to suggest new or progressive disease.  -MRI Brain 11/23/22:   * STABLE VAGUE ENHANCEMENT, MILD INCREASED T2 SIGNAL, AND HEMOSIDERIN DEPOSITION POSTERIOR SUPERIOR ASPECT RIGHT INSULA AT SITE OF PRIOR TREATED METASTASIS. 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 * NO NEW ENHANCING LESIONS IDENTIFIED.  -CT C/A/P 1/20/23:  Ill-defined nodular opacity in the right upper lobe is unchanged when compared to previous exam.  No significant change in the sclerotic focus in the T11 vertebral body when compared to previous exam.  -MRI C/T-Spine 3/23/23:  No findings of osseous malignancy. No abnormal cord enhancement or cord signal abnormality.  -MRI L-Spine 3/23/23:  No findings of osseous or leptomeningeal malignancy.  -MRI Brain 3/23/23:   Similar minimal curvilinear enhancement along the posterior insula (35-92). Similar minimal associated T2 FLAIR signal abnormality.  Minimally decreased size of previously seen focus of enhancement in the distal left IAC, currently 2.5 mm, previously 3 mm.  No new abnormal parenchymal or leptomeningeal enhancement.  -CT C/A/P 5/5/23:  Stable exam. No new disease in the chest, abdomen or pelvis.  Unchanged spiculated right apical nodule, sub-4 mm left upper lobe nodules and multiple osteosclerotic lesions.  -MRI Brain 7/24/23:  Stable vague enhancement, mild increased T2 signal, and hemosiderin deposition posterior superior aspect right insula at site of prior treated metastasis. Stable 3 mm vestibular schwannoma fundus left IAC. No new enhancing lesions identified.  -CT C/A/P 8/18/23:  Stable exam. Unchanged 1.1 cm right apical nodule, few scattered bilateral sub-4 mm pulmonary nodules, and osteosclerotic lesions.  -MRI Brain 12/1/23:  No new areas of abnormal enhancement. Increase in size of the cortical focus of increased T2 FLAIR signal within the posterior aspect of the left frontal lobe at site of a prior treated metastasis. No associated abnormal enhancement. New 4 x 3 mm region of increased T2 signal posterior left frontal lobe near the vertex with associated decreased T1 signal and without associated enhancement. Attention to these areas on follow-up recommended.  -CT C/A/P 12/1/23:  Stable 1.2 cm right apical spiculated nodular density with stable additional less than 0.4 cm nodules. No new or enlarging pulmonary nodules or consolidations. Stable appearance of sclerotic lesions throughout the visualized osseous structures. No new evidence of metastatic disease. Abnormal appearing endometrium in a patient postmenopausal, recommend pelvic ultrasound.  -TV U/S 1/3/24:  Thickened, cystic endometrium measuring up to 1.0 cm. Gynecologic consultation for tissue sampling is recommended.  -Screening Mammo/Sono 1/25/24:  No mammographic or sonographic evidence of malignancy.  -CT C/A/P 4/2/24:  Interval disease stability within the chest, abdomen, and pelvis compared to 12/1/2023:  Right apical 1.3 cm irregular lesion and right lower lobe pulmonary micronodule unchanged. Sclerotic osseous lesions unchanged. Similar-appearing thickening of the endometrium.  -MRI Brain 5/2/24:   1. No acute intracranial abnormality. No findings suspicious for new intracranial metastases. 2. Unchanged left vestibular schwannoma. 3. Mild interval progression of overall very mild white matter disease which may be the sequelae of treatment effect, chronic microvascular ischemic disease, vasculitis, chronic migraines, and/or Lyme disease among other possibilities. 4. Nonspecific progression of an ovoid T1 hypointense focus in the medial left parietal lobe. Attention on follow-up exams is advised.  -CT CAP 8/10/24:  Stable exam, with unchanged 1.2 cm right apical nodule, punctate posterior right lower lobe nodule and osteosclerotic lesions. Unchanged indeterminate endometrial thickening. No new disease in the chest, abdomen or pelvis.  -MRI head w/wo con 8/15/24:  Minimal increase in size of multiple FLAIR hyperintense, nonenhancing lesions in the bilateral precentral gyri and right medial frontoparietal white matter. Spectroscopy evaluation of the left medial precentral gyrus lesion is nonspecific. Lesions that are amenable to perfusion evaluation demonstrate no hyperperfusion. Evolving posttreatment changes are considered but continued follow-up is recommended.  -PET/CT 11/22/24:   1. Since 6/21/2021 FDG PET/CT, no significant interval change of the spiculated 1.8 cm right upper lobe lung nodule demonstrating mild FDG activity. No new hypermetabolic foci in the thorax. 2. Interval increased size of mildly avid RIGHT sclerotic sacral lesion, previously was nonavid. Other are non-FDG avid sclerotic lesions compatible for treated bone metastases are stable. 3. Persistent mild nonspecific activity within bilateral 1B cervical lymph nodes, may be inflammatory. 4. Stable nonspecific mildly avid mediastinal and bilateral hilar lymph nodes. Resolution of previously noted right hilar lymph noted activity. 5. Stable likely physiologic activity of bilateral adrenal glands. 6. Previously noted mild gastric hypermetabolism not visualized.  - MR brain w/wo contrast 2/15/25: Interval mild increase in 1.5 cm T2 FLAIR hyperintense, nonenhancing lesion in the left medial precentral gyrus compared with 8/15/2024. Additional T2 FLAIR hyperintense, nonenhancing lesions in the right medial frontoparietal white matter and the bilateral precentral gyri are not significantly changed compared to 8/15/2024. No new enhancing lesions identified.  - CT C/A/P with contrast 3/8/25: Enlarging right sacral osteosclerotic lesion (corresponding to the FDG avidity noted on recent PET/CT). Other osteosclerotic metastases appear grossly unchanged. Stable 1.6 cm right apical nodule and areas of mild interlobular septal thickening in the mid to lower lungs. Unchanged thickening of the adrenal glands. Unchanged endometrial thickening.  Images Reviewed/Interpreted:  -MRI Brain Spect 3/24/25:  T2/FLAIR hyperintense lesion involving the left sensorimotor region is grossly stable since MRI 2/15/2025. Spectroscopy and perfusion evaluation favors benign entity/posttreatment changes or low-grade neoplasm over active metastasis/high-grade neoplasm. Multiple additional similar but smaller lesions are also grossly stable throughout the bilateral cerebral hemispheres. A 0.3 cm left IAC enhancing lesion is suspicious for a schwannoma, stable.  -Mammo/Sono 3/29/25:  No mammographic or sonographic evidence of malignancy.

## 2025-05-12 NOTE — PHYSICAL EXAM
[Chaperoned Physical Exam] : A chaperone was present in the examining room during all aspects of the physical examination. [MA] : MA [Normal] : Anus and perineum: Normal sphincter tone, no masses, no prolapse. [FreeTextEntry2] : Romain [de-identified] : external os stenotic

## 2025-05-12 NOTE — PAST MEDICAL HISTORY
[Total Preg ___] : G[unfilled] [Postmenopausal] : The patient is postmenopausal [Menarche Age ____] : age at menarche was [unfilled] [Menopause Age____] : age at menopause was [unfilled] [Live Births ___] : P[unfilled]  [Full Term ___] : Full Term: [unfilled] [Living ___] : Living: [unfilled]

## 2025-05-12 NOTE — DISCUSSION/SUMMARY
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 Fields in detail.  Recent pap smear reveals LGSIL HPV+.  Colposcopic directed cervical biopsies are advised however due to cervical stenosis from prior CKC, they are not feasible.  I discussed option of Dilation of cervix under anesthesia and colposcopy vs definitive hysterectomy.  Risks/benefits of each option discussed.  She desires EUA with colposcopy in OR.    I also addressed concern of thickened endometrium noted on CT.  Endometrial biopsy in 1/2024 revealed benign findings.  Endometrial biopsy in office is not feasible due to cervical stenosis.  I am recommending D&C hysteroscopy at time of colposcopy in OR.     She will call office with preferred scheduling.
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 Fields in detail.  Recent pap smear reveals LGSIL HPV+.  Colposcopic directed cervical biopsies are advised however due to cervical stenosis from prior CKC, they are not feasible.  I discussed option of Dilation of cervix under anesthesia and colposcopy vs definitive hysterectomy.  Risks/benefits of each option discussed.  She desires EUA with colposcopy in OR.    I also addressed concern of thickened endometrium noted on CT.  Endometrial biopsy in 1/2024 revealed benign findings.  Endometrial biopsy in office is not feasible due to cervical stenosis.  I am recommending D&C hysteroscopy at time of colposcopy in OR.     She will call office with preferred scheduling.
done

## 2025-05-12 NOTE — HISTORY OF PRESENT ILLNESS
[FreeTextEntry1] : Referred by (GYN) Dr. Welch   Ms. HORTON is a 55 year old  female, referred from Dr. Welch, for a thickened endometrial lining and an abnormal pap smear. 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  She was diagnosed in 2019, and received brain RT, and is on a targeted therapy, Alunbrig.  She remains with stable disease since 2019.   Imaging last January noted a thickened endometrial lining measuring 10 mm on TVUS.  She reports no VB. 2024- EMBx- negative endometrial tissue 2024- [facility] biopsy- CIN2-3 with negative margins, ECC (-)  24- PET/CT- HEAD/NECK: Mild activity within bilateral level 1B cervical lymph nodes is similar to prior, for instance left level 1B subcentimeter, SUV 2.7, image 40, previously SUV 3.2. Physiologic FDG activity in visualized brain, remainder of the head, and neck. THORAX: Similar appearance of mildly FDG avid of small paratracheal and bilateral hilar nodes, overall similar to prior. Previously noted right axillary nodes are now not well visualized. LUNGS: Stable spiculated Nodular density in the right lung upper lobe measures 1.8 x 1.1 cm, SUV 2.0 (image 73), (previously 1.8 x 1, SUV 2.6.) PLEURA/PERICARDIUM: No abnormal FDG activity. No effusion. HEPATOBILIARY/PANCREAS: Physiologic FDG activity.  For reference, normal liver demonstrates SUV mean 3.7, previously 2.9. SPLEEN: Physiologic FDG activity. Normal in size. ADRENAL GLANDS: Symmetric activity within both adrenal glands, no corresponding abnormality on CT, unchanged. KIDNEYS/URINARY BLADDER: Physiologic excreted FDG activity. REPRODUCTIVE ORGANS: No abnormal FDG activity. ABDOMINOPELVIC LYMPH NODES/RETROPERITONEUM: No enlarged or FDG-avid lymph node. ESOPHAGUS/STOMACH/BOWEL/PERITONEUM/MESENTERY: Nonspecific bowel uptake. olonic diverticulosis. VESSELS: Unremarkable. BONES/SOFT TISSUES: Mild FDG activity to an enlarging right sacral lesion, measures 1.8 x 1.2 cm, SUV 3.4, image 200, (previously not avid and measured 2.8 cm.) Previously noted mildly avid activity to fractures of right 10th and 12th ribs are not well visualized. Non-FDG avid sclerotic lesions in T5, and T11 vertebral bodies, and pelvis are unchanged.  3/2025 CT CAP: Enlarging right sacral osteosclerotic lesion (corresponding to the FDG avidity noted on recent PET/CT). Other osteosclerotic metastases appear grossly unchanged.  Stable 1.6 cm right apical nodule and areas of mild interlobular septal thickening in the mid to lower lungs. Unchanged thickening of the adrenal glands. Unchanged endometrial thickening.  3/2025- Pap- LSIL, HRHPV(+) 18/45 (+) 3/21/25- ECC- no endocervical tissue   She denies abdominal/pelvic pain, dyspnea or chest pain, vaginal bleeding or discharge, nausea/vomiting, changes in bowel habits or urination, lower extremity edema or pain.   HM Pap- see above Mammo-  Colonoscopy- 2019

## 2025-05-12 NOTE — PHYSICAL EXAM
[Chaperoned Physical Exam] : A chaperone was present in the examining room during all aspects of the physical examination. [MA] : MA [Normal] : Anus and perineum: Normal sphincter tone, no masses, no prolapse. [FreeTextEntry2] : Romain [de-identified] : external os stenotic

## 2025-05-13 NOTE — PHYSICAL EXAM
[General Appearance - Alert] : alert [General Appearance - In No Acute Distress] : in no acute distress [Oriented To Time, Place, And Person] : oriented to person, place, and time [Person] : oriented to person [Place] : oriented to place [Time] : oriented to time [Short Term Intact] : short term memory intact [Remote Intact] : remote memory intact [Registration Intact] : recent registration memory intact [Span Intact] : the attention span was normal [Concentration Intact] : normal concentrating ability [Fluency] : fluency intact [Comprehension] : comprehension intact [Current Events] : adequate knowledge of current events [Past History] : adequate knowledge of personal past history [Vocabulary] : adequate range of vocabulary [Cranial Nerves Optic (II)] : visual acuity intact bilaterally,  pupils equal round and reactive to light [Cranial Nerves Oculomotor (III)] : extraocular motion intact [Cranial Nerves Trigeminal (V)] : facial sensation intact symmetrically [Cranial Nerves Facial (VII)] : face symmetrical [Cranial Nerves Vestibulocochlear (VIII)] : hearing was intact bilaterally [Cranial Nerves Glossopharyngeal (IX)] : tongue and palate midline [Cranial Nerves Accessory (XI - Cranial And Spinal)] : head turning and shoulder shrug symmetric [Cranial Nerves Hypoglossal (XII)] : there was no tongue deviation with protrusion [Motor Tone] : muscle tone was normal in all four extremities [Motor Strength] : muscle strength was normal in all four extremities [No Muscle Atrophy] : normal bulk in all four extremities [Sensation Tactile Decrease] : light touch was intact [Balance] : balance was intact [Sclera] : the sclera and conjunctiva were normal [PERRL With Normal Accommodation] : pupils were equal in size, round, reactive to light, with normal accommodation [Extraocular Movements] : extraocular movements were intact [Outer Ear] : the ears and nose were normal in appearance [Neck Appearance] : the appearance of the neck was normal [] : no respiratory distress [Abnormal Walk] : normal gait [Past-pointing] : there was no past-pointing [Tremor] : no tremor present [FreeTextEntry6] : No pronator drift

## 2025-05-13 NOTE — HISTORY OF PRESENT ILLNESS
[FreeTextEntry1] : Initial HPI: Ms. Fields is a very pleasant 55 year old female with PMH Daiabetes, rheumatoid arthritis, metastatic NSCLC s/p Gamma Knife to single brain metastasis measuring 1.4 cm in size in the right inferior frontal gyrus on 3/13/19 with Dr. Elkins, who was referred by Dr. Elkins to discuss potential biopsy versus resection of new metastatic lesion in left medial precentral gyrus. She is clinically asymptomatic.  MRI brain w w/o contrast 2/15/2025 IMPRESSION: Interval mild increase in 1.5 cm T2 FLAIR hyperintense, non-enhancing lesion in the left medial precentral gyrus compared with 8/15/2024. Additional T2 FLAIR hyperintense, nonenhancing lesions in the right medial frontoparietal white matter and the bilateral precentral gyri are not significantly changed compared to 8/15/2024 No new enhancing lesions identified.  3/6/25: Denies any new symptoms. Denies speech difficulties. Denies pain/weakness to right side. Still has occasional pain to left upper extremity that began after chemotherapy medication.  MRI brain w/ and w/o contrast Quicktome protocol, MR spectroscopy and MR perfusion performed 3/27/24. IMPRESSION: -T2/FLAIR hyperintense lesion involving the left sensorimotor region is grossly stable since MRI 2/15/2025. Spectroscopy and perfusion evaluation favors benign entity/posttreatment changes or low-grade neoplasm over active metastasis/high-grade neoplasm. -Multiple additional similar but smaller lesions are also grossly stable throughout the bilateral cerebral hemispheres. -0.3 cm left IAC enhancing lesion is suspicious for a schwannoma, stable.  5/8/25:  Denies new symptoms. Denies speech difficulties, clumsiness with hands, pain/weakness to right side.

## 2025-05-13 NOTE — ASSESSMENT
[FreeTextEntry1] : 55 year old female PMH Diabetes, rheumatoid arthritis, metastatic NSCLC s/p Gamma Knife to single brain metastasis measuring 1.4 cm in size in the right inferior frontal gyrus on 3/13/19 with Dr. Elkins, who was referred by Dr. Elkins to discuss potential biopsy versus resection of new metastatic lesion in left medial precentral gyrus. She is clinically asymptomatic.   MRI brain w/ and w/o contrast Quicktome protocol, MR spectroscopy and MR perfusion performed 3/27/24. IMPRESSION: -T2/FLAIR hyperintense lesion involving the left sensorimotor region is grossly stable since MRI 2/15/2025. Spectroscopy and perfusion evaluation favors benign entity/posttreatment changes or low-grade neoplasm over active metastasis/high-grade neoplasm. -Multiple additional similar but smaller lesions are also grossly stable throughout the bilateral cerebral hemispheres. -0.3 cm left IAC enhancing lesion is suspicious for a schwannoma, stable.  Discussed case at [facility] multidisciplinary brain tumor board. Brain tumor board consensus was to continue to watch the lesion as long as she remains asymptomatic.  Plan: - Dr. Elkins ordered a follow up MRI to be done in June. Will follow future imaging. Will discuss with future imaging. - Follow up if future imaging shows pathology requiring neurosurgical intervention. - Continue to watch the lesion as long as she remains asymptomatic. 
[FreeTextEntry1] : 55 year old female PMH Diabetes, rheumatoid arthritis, metastatic NSCLC s/p Gamma Knife to single brain metastasis measuring 1.4 cm in size in the right inferior frontal gyrus on 3/13/19 with Dr. Elkins, who was referred by Dr. lEkins to discuss potential biopsy versus resection of new metastatic lesion in left medial precentral gyrus. She is clinically asymptomatic.   MRI brain w/ and w/o contrast Quicktome protocol, MR spectroscopy and MR perfusion performed 3/27/24. IMPRESSION: -T2/FLAIR hyperintense lesion involving the left sensorimotor region is grossly stable since MRI 2/15/2025. Spectroscopy and perfusion evaluation favors benign entity/posttreatment changes or low-grade neoplasm over active metastasis/high-grade neoplasm. -Multiple additional similar but smaller lesions are also grossly stable throughout the bilateral cerebral hemispheres. -0.3 cm left IAC enhancing lesion is suspicious for a schwannoma, stable.  Discussed case at [facility] multidisciplinary brain tumor board. Brain tumor board consensus was to continue to watch the lesion as long as she remains asymptomatic.  Plan: - Dr. Elkins ordered a follow up MRI to be done in June. Will follow future imaging. Will discuss with future imaging. - Follow up if future imaging shows pathology requiring neurosurgical intervention. - Continue to watch the lesion as long as she remains asymptomatic. 
Attending Attestation (For Attendings USE Only)...

## 2025-06-26 NOTE — HISTORY OF PRESENT ILLNESS
[FreeTextEntry1] : Ms Fields has a h/o metastatic NSCLC with ALK rearrangement who presented with a single brain metastasis measuring 1.4 cm in size in the right inferior frontal gyrus. She completed gamma knife radiation therapy for a total of 2000 cGy to the right parietal area on 3/13/19. IMRT to RIGHT sacrum 30Gy over 6fxs 4/8-4/15/2025  ONCOLOGY HISTORY She presented to me as a 49 year old female with PMH of rheumatoid arthritis, who presented to [place] [facility] on 2/20 from [facility] with complaints of left sided coolness. She had previously had imaging outside which revealed RUL spiculated mass, sclerotic lesion in T11, as well as 1.4 cm enhancing nodule in insula cortex with vasogenic edema and midline shift. MRI brain from 2/25/19 showed Right inferior frontal gyrus nodule consistent with a primary versus secondary neoplasm, 1.4 X 1.4 X 1.3 cm.  Punctate nodular enhancement along the distal left seventh eighth nerve complex may represent a tiny vestibular schwannoma or leptomeningeal disease. Plan made for SRS.  Pathology from right upper lobe CT guided FNA from 2/26/19 revealed adenocarcinoma, PDL1+ at 5%. She received GK in March of 2019.  She was found to have an ALK mutation and started on Alcensa.    10/2/19- Ms. Fields presents today for follow up. She underwent a brain MRI on 10/1/19 which showed no new disease. Today she is feeling well. She has some bradycardia. Denies headaches, focal weakness, numbness, tingling, nausea, vomiting. Recently back to work. Will have PET scan this coming Friday.  12/4/19- Ms. Fields presents today for follow up. She underwent a brain MRI as an inpatient on 11/27/19 which showed Enhancing lesion in the right frontal operculum just medial to the sylvian fissure is larger when compared to 10/1/2019 but is significantly smaller compared with 3/13/2019 and may represent neoplasm versus post therapy changes. No change tiny enhancing nodule left internal auditory canal. 4.5 mm in AP diameter x 6.2 mm transversely.She notably presented to [facility] on 11/27/19 with 3 episodes of left face, hand, and leg. SHe was discharged to follow up with us. She follows with . Will be seeing Dr. Salinas this week to establish care. Today she denies headaches, focal weakness, nausea, vomiting. She is on Keppra 500 BID. Not on any steroids. Will see Dr. Garcia. She has not had any further feelings of tingling to her left face and left arm. Continues on Alectinib, was dose reduced for a bit of time due to bradycardia.  Denies trouble with balance. Intermittently has a slight sensation, indescribable, to the left side of her face around her mouth, which she has previously experienced when starting Alectinib.   1/15/20- Ms. Fields presents today for follow up. She underwent an MRI with spectroscopy and perfusion this afternoon. This  CT CAP 1/7/20 showed Spiculated appearing mass in the right upper lobe, consistent with patient's  known lung cancer. Scattered sclerotic osseous lesions may represent metastatic disease. These are likely progressed from prior.  Today she is feeling well. Continues on Alectinib. Denies headaches, confusion, nausea, vomiting, No further seizures. Now on briviact for seizure management. Now following with neuro oncologist Dr. Karis Reyes.   MRI interpreted as:  Impression: Enhancement in the right basal ganglia frontal operculum larger compared with 11/27/2019 is suspicious for progression of neoplasm. MR spectroscopy does not demonstrate increased choline to creatine, against tumor recurrence. MR perfusion demonstrates decreased blood pool suggesting post therapy change. Recommend close clinical follow-up.  03/5/2020- Ms. Fields presents today for follow up. She recently was found to have increased LFTs and a new anemia with HGB 6.7. Alectinib was held. MRI brain from 2/27/2020 showed a similar appearing enhancing right subinsular and right frontal opercular lesion. Decreased edema in the larger right subinsular lesion.   PET scan 1/31/2020 showed mildly FDG avid RUL lung mass was decreased in size and metabolism when compared to 10/4/2020. There was resolution of a small mildly FDG avid right paratracheal node. Non FDG avid sclerotic lesions are compatible with treated bone mets. Today she denies headaches, focal weakness, numbness, tingling, nausea vomiting. No seizures. Last dose of steroids was this morning. Notes swelling in her legs has returned recently. Feeling extremely cold and tired.   05/28/20- Ms. Fields is seen today via Telehealth platform in follow up. Verbal consent obtained. PET/CT 05/04/20 noted mildly FDG-avid spiculated right upper lobe lung mass is unchanged on CT, and minimally increased in metabolism as compared to prior study dated 1/31/2020. Etiology is indeterminate. Small focus of residual disease is not excluded. New small hypermetabolic right perihilar and right paratracheal lymph nodes are nonspecific.  Non- FDG-avid sclerotic lesions, unchanged, compatible with treated bone metastases. She saw Dr. Humphreys on 03/20/20 with plans for bone marrow biopsy for her hemolytic anemia. She was discontinued on Alectinib due to anemia and was restarted again by Dr. Silveira. She continues to take  Briviact for seizure.  She saw Dr. Salinas on 05/08/20 with follow up plans in June.MRI brain 5/27/2020 showed Unchanged size of enhancing lesion in the right subinsular region with increased surrounding vasogeni c edema. Continued close interval follow-up is recommended. Today she feels fine. Denies headaches, nausea,  dizziness, vision or auditory changes, no weakness, numbness or tingling. On Alectinib.   8/4/20 She presents for follow up 7/30/20 MRI showed abnormal enhancing lesion with surrounding edema is again identified though decreased size and surrounding edema seen when compared prior exam.  Today she feels mostly well. Denies headaches, nausea, vomiting. Has not had any further tingling or tickling sensations on her face. Recently stopped her antifungals as they caused her SOB and edema, and this has helped. Continues on alectinib. Bone marrow biopsy for her anemia found to be consistent with histoplasmosis.    11/4/20: She presents for f/u with most recent MRI from 10/30/20 showing  decreasing enhancement and surrounding FLAIR signal abnormality along the inferior right frontal lobe consistent with response to treatment. No new lesions are identified.  3/4/2021- Ms. Fields presents today for follow up.  She continues to follow with Dr. Salinas. Continues on alectinib dose reduced after ANDREW treatment in 12/2020. Has not yet started posaconazole for histoplasmosis.   MR brain 2/26/2021 showed Small focus of enhancement in the right frontal lobe without significant interval change from the most recent exam compatible with a treated metastasis. 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  PET scan 2/12/2021 showed  1. Mildly FDG-avid spiculated right upper lobe nodule is unchanged in size and metabolism, compatible with treated disease. 2. Few new small FDG-avid right axillary lymph nodes are nonspecific, probably reactive. Further evaluation may be performed with ultrasound. Nonspecific small FDG-avid mediastinal and bilateral hilar lymph nodes, unchanged in number and avidity. Right paratracheal node demonstrates new calcification. 3. Probable physiologic FDG activity, bilateral adrenal glands, unchanged. 4. Mild FDG activity associated with fractures in right 11th and 12th ribs, decreased as compared to prior study. Few non-FDG-avid sclerotic lesions, unchanged, compatible with treated bone metastases.  Today she feels very well. Had a headache after her cataract surgery, but none since. No seizures, no focal weakness.  7/15/2021- Ms. Fields presents today for follow up. She was admitted 6/29-7/1 with left arm, left face, and left chest discomfort. EEG normal. MRI head 7/1/2021 showed  1.  No new lesions are identified. 2.  Stable appearance of a previously treated metastatic lesion as described. 3.  Stable 3 mm enhancing lesion in the left internal auditory canal which likely represents a vestibular schwannoma.  Alectinib held upon discharge. discharged on briviact 25mg in AM, 50mg in PM.  She started pemtrexed on 7/9.  Notably PET scan on 6/21/2021 showed  1. Mildly FDG avid spiculated right upper lobe nodule is unchanged in size and metabolism, compatible with treated disease. 2. Nonspecific new mild FDG avidity of bilateral level Ib cervical lymph nodes which have minimally increased in size. Please correlate clinically and with ultrasound for further evaluation as indicated. 3. Interval decrease in size and FDG avidity of nonspecific right axillary lymph nodes. Nonspecific small FDG avid mediastinal and bilateral hilar lymph nodes, unchanged in number and avidity. 4. Probable physiologic FDG activity, bilateral adrenal glands, not significantly changed. 5. Interval further decreased FDG activity associated with fractures in the right 11th-12th ribs. A few non-FDG avid sclerotic lesions, unchanged, compatible with treated bone metastasis. 6. Unchanged mild gastric hypermetabolism. Please correlate clinically for gastritis.  Today she is frustrated regarding recent tightness to her left arm, face, chest, and jaw. She notes this has been worse lately since stopping alectinib and continuing on posaconazole with her new chemotherapy. She does not have this symptom when she does not take posaconazle.   10/14/2021 - Patient presents for followup. Interval MRI on 10/05 shows 2 small enhancing lesions which are new since the prior exam of 7/1/2021 suspicious for metastasis. A 5 mm focus is in the right frontal lobe along the motor strip and a 3 mm focus is in the left frontal lobe in the subcortical white matter of the precentral gyrus.  Recent bone marrow biopsy showed histoplasmosis as well as AdenoCa. Recent body imaging PET from 10/05 showing new or progressive disease at RUL, mediastinum and pelvic bones.  She has had difficulty with tolerating her alectinib and has been on varying doses over the previous year most recently having held it all together until this past week.    Restarted Alectinib last Saturday due to POD.   12/09/2021:  Patient reports today for regular follow up.  Her last MRI was prior to her 10/14 appt, which showed the presence of a 5 mm focus in the right frontal lobe along the motor strip and a 3 mm focus in the left frontal lobe in the subcortical white matter of the precentral gyrus. MRI brain 12/5/21 as per our read shows resolution of these lesions.  Saw Dr. Salinas 11/19/21, with Alectinib discontinued d/t concern of hemolytic anemia. Of note, on 12/7/21  recently presented to [facility] ED with hemoptysis, and CTA done negative for PE. Today she reports feeling well. Denies dizziness, nausea, vomiting. Continues to take poscanozole and alunbrig which she feels is elevating her blood pressure and increasing pressure in the head.   2/17/2022: Pt presents for follow-up. Reports occasional dizziness, fatigue, and lightheadedness, but unsure if due to changes in BP medication. Notes the adjustment in medications has helped HA (ongoing for some months). Occasionally has blurred vision when trying to read and write. Continues on Brigatinib, and Posaconazole (histoplasmosis)  5/18/2022- Ms. Fields presents today for follow up.  Brain MRI done 5/13/2022 showed 1. No evidence of recurrent metastatic disease. 2. A punctate enhancing focus and associated susceptibility artifact in the ventral posterior aspect of the right frontal lobe, compatible with sequela of treated metastasis, is stable. 3. 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  Continues on brigatinib.  Notes some pain to crown of head extending from bilateral shoulders over the past few months. shoulders get sore at the end of the day.  8/25/2022- MsSea Fields presents today for follow up. continues to follow with Dr. Salinas. Completed posiconazole in 4/2022. Continues on brigatinib.  CT CAP 7/22/2022 showed New patchy groundglass opacity in the right lower lobe, likely infectious/inflammatory. Enlarging indeterminate mediastinal and hilar lymphadenopathy. Follow-up chest CT in 3 months or as per clinical protocol. Stable spiculated right apical nodule and osteosclerotic metastases. No new disease in the abdomen or pelvis.  [facility] MRI 8/22/2022 showed No evidence of intracranial metastatic disease. Resolution of previously seen precentral gyrus subcentimeter enhancing metastases. A focal area of T2 hyperintensity is seen in the region of the previously seen left-sided enhancing lesion likely representing the sequelae of prior radiation therapy. Continued follow-up is advised. Stable left internal auditory canal vestibular schwannoma. Stable area of enhancement with associated susceptibility artifact within the posterior right frontal lobe compatible with sequela of treated metastasis.  feeling very well overall no headaches, no nausea, no focal weakness, no numbness or tingling.  11/30/2022- Ms. Diamond presents today for follow up. MRI brain done 11/23/2022 showed * STABLE VAGUE ENHANCEMENT, MILD INCREASED T2 SIGNAL, AND HEMOSIDERIN DEPOSITION POSTERIOR SUPERIOR ASPECT RIGHT INSULA AT SITE OF PRIOR TREATED METASTASIS. 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  * NO NEW ENHANCING LESIONS IDENTIFIED.  Continues to follow with Dr. Salinas. continues on brigatinib.  CT CAP 10/21/2022 showed Since 7/22/2022.  Stable right upper lobe spiculated right upper lobe nodule.  Stable mildly enlarged hilar lymph nodes (although increased compared to 4/22/2022).  Stable sclerotic bone lesions. No finding to suggest new or progressive disease.  Overall feeling well. no headaches, no nausea, no focal weakness, no numbness or tingling. sometimes gets muscle pains.   3/30/2023- Ms. Fields presents today for follow up. SHe has contineud to follow with Dr. Salinas. continues on second line brigatinib. Brain MRI and total spine MRI done at request of neuro oncologist done 3/23/2023.   MRI brain 3/23/2023 showed  Similar minimal curvilinear enhancement along the posterior insula (35-92). Similar minimal associated T2 FLAIR signal abnormality. Minimally decreased size of previously seen focus of enhancement in the distal left IAC, currently 2.5 mm, previously 3 mm No new abnormal parenchymal or leptomeningeal enhancement.  MRI total spine 3/23/2023 showed No findings of osseous malignancy. No abnormal cord enhancement or cord signal abnormality.  CT CAP 1/20/2023 showed  Ill-defined nodular opacity in the right upper lobe is unchanged when compared to previous exam. No significant change in the sclerotic focus in the T11 vertebral body when compared to previous exam.  feels well today. no headaches, no nausea, no focal weakness. no longer having back pain.   Visit dated 8/2/2023 Patient returns for routine f/u with images for review. Reports doing well with no new neurological deficits to offer. Continues to follow with Dr. Salinas on 2nd line Brigatinib  MRI brain w w/o contrast 7/24/2023 IMPRESSION: Stable vague enhancement, mild increased T2 signal, and hemosiderin deposition posterior superior aspect right insula at site of prior treated metastasis. Stable 3 mm vestibular schwannoma fundus left IAC. No new enhancing lesions identified.  CT C/A/P 5/5/2023 IMPRESSION: Stable exam. No new disease in the chest, abdomen or pelvis. Unchanged spiculated right apical nodule, sub-4 mm left upper lobe nodules and multiple osteosclerotic lesions.  Visit dated 12/7/2023 Patient returns for routine follow up to include progress check and review of completed cranial images. Denies N/V, HA/unilateral extremity weakness/memory changes/gait disturbance/bowel/bladder dysfunction or other neurologic symptoms. No issues with speech or comprehension. Denies seizures  Continues to follow with Dr. Salinas on 2nd line Brigatinib targeting her ALK rearrangement for as long as it benefits the patient, currently at 180mg PO (per heme/onc notes)  MRI brain w w/o contrast 12/1/2023 IMPRESSION: No new areas of abnormal enhancement. Increase in size of the cortical focus of increased T2 FLAIR signal within the posterior aspect of the left frontal lobe at site of a prior treated metastasis. No associated abnormal enhancement. New 4 x 3 mm region of increased T2 signal posterior left frontal lobe near the vertex with associated decreased T1 signal and without associated enhancement. Attention to these areas on follow-up recommended.   CT C/A/P 12/1/2023  final read not available at time of this entry  VISIT dated 5/8/2024 Patient presents for routine f/u and progress check with cranial images for review. Reports doing well overall w/o any new neurological findings. Baseline muscle aches remain persistent but doesn't limit participation in activity attributes to current treatment regimen. Denies N/V, HA/unilateral extremity weakness/memory changes/gait disturbance/bowel/bladder dysfunction or other neurologic symptoms. No issues with speech or comprehension.  Sees neurologist as well.  Continues to follow with Dr. Salinas on 2nd line Brigatinib targeting her ALK rearrangement since late November 2021.  CT C/A/P 4/2/2024 IMPRESSION: Interval disease stability within the chest, abdomen, and pelvis compared to 12/1/2023: Right apical 1.3 cm irregular lesion and right lower lobe pulmonary micronodule unchanged. Sclerotic osseous lesions unchanged. Similar-appearing thickening of the endometrium.  MRI brain w w/o contrast 5/2/2024 IMPRESSION: 1. No acute intracranial abnormality. No findings suspicious for new intracranial metastases. 2. Unchanged left vestibular schwannoma. 3. Mild interval progression of overall very mild white matter disease which may be the sequelae of treatment effect, chronic microvascular ischemic disease, vasculitis, chronic migraines, and/or Lyme disease among other possibilities. 4. Nonspecific progression of an ovoid T1 hypointense focus in the medial left parietal lobe. Attention on follow-up exams is advised.   VISIT DATED: 8/29/2024 Patient returns for progress check and evaluation. Cranial images have been completed as recommended to further assess and r/o POD which was of concern on MRI in May 2024 Continues to follow with Dr. Salinas on 2nd line Brigatinib 180mg daily targeting her ALK rearrangement since late November 2021. Patient denies headache, dizziness, nausea or vomiting, vision changes and has stable gait. She follow up with Dr. Bernard on 8/16/24 who informed her that there is no changes and everything is stable. MRI on 8/15/24, awaiting final report.  CT C/A/P w w/o contrast 8/10/2024 IMPRESSION: Stable exam, with unchanged 1.2 cm right apical nodule, punctate posterior right lower lobe nodule and osteosclerotic lesions. Unchanged indeterminate endometrial thickening. No new disease in the chest, abdomen or pelvis.  MRI brain with SPECT 8/15/2024 final read pending  VISIT DATED: 2/26/2025 Ms. Fields presents for routine f/u and progress check with cranial images for review. States doing well overall but is a little upset today after a misunderstanding with her spouse. Notes mild shoulder discomfort which varies in intensity with temperature changes, but she has not need to utilize medications. Denies N/V, HA/RIGHT side weakness/memory changes/gait disturbance/bowel/bladder dysfunction or other neurologic symptoms. No issues with speech or comprehension.  Follows with neuro/onc @ [facility] Continues to follow with Dr. Salinas on 2nd line Brigatinib targeting her ALK rearrangement since late November 2021. Last PETct 11/2024 with Interval increased size of mildly avid RIGHT sclerotic sacral lesion, previously was nonavid. Other are non-FDG avid sclerotic lesions compatible for treated bone metastases are stable. Otherwise, stable findings. Plans for CT C/A/P scheduled for 3/8/2025.   MRI brain w w/o contrast 2/15/2025 IMPRESSION: Interval mild increase in 1.5 cm T2 FLAIR hyperintense, non-enhancing lesion in the left medial precentral gyrus compared with 8/15/2024. Additional T2 FLAIR hyperintense, nonenhancing lesions in the right medial frontoparietal white matter and the bilateral precentral gyri are not significantly changed compared to 8/15/2024 No new enhancing lesions identified.  CT C/A/P 3/8/25 IMPRESSION: Enlarging right sacral osteosclerotic lesion (corresponding to the FDG avidity noted on recent PET/CT). Other osteosclerotic metastases appear grossly unchanged. Stable 1.6 cm right apical nodule and areas of mild interlobular septal thickening in the mid to lower lungs. Unchanged thickening of the adrenal glands. Unchanged endometrial thickening.   3/17/25: Presents for follow-up visit to discuss enlarging right sacral osteosclerotic lesion.  She saw Dr. Alicia for discussion of biopsy vs resxn of lesion, was ordered for MRI with Quicktome protocol with spectroscopy and perfusion scheduled for 3/22/2025.  Continues to follow with Dr. Salinas on 2nd line Brigatinib targeting her ALK rearrangement since late November 2021, tolerating.  She reports feeling well, no headache, no n/v, no diplopia, no neuro symptoms. She also denies back pain other than the chronic left upper back/neck muscle tension. No leg weakness, no numbness/tingling. She has no sacral pain, numbness or weakness in her lower extremities.   VISIT DATED: 4/2/2025 Ms. Fields returns for continuation of care with MR spect/perfusion to r/o viable disease vs treatment changes to guide next steps in her care. Awaiting date for RT to sacrum Continues to follow with Dr. Salinas on 2nd line Brigatinib targeting her ALK rearrangement since late November 2021, tolerating.   MRI brain with SPECT/PERFUSION 3/24/2025 IMPRESSION: -T2/FLAIR hyperintense lesion involving the left sensorimotor region is grossly stable since MRI 2/15/2025. Spectroscopy and perfusion evaluation favors benign entity/posttreatment changes or low-grade neoplasm over active metastasis/high-grade neoplasm. -Multiple additional similar but smaller lesions are also grossly stable throughout the bilateral cerebral hemispheres. -0.3 cm left IAC enhancing lesion is suspicious for a schwannoma, stable.  VISIT DATED: 6/26/2025 Ms. Fields presents for progress evaluation she is now s/p IMRT to RIGHT sacrum 30Gy over 6 fxs 4/8-4/15/2025. States she is doing well w/o signs of bowel/bladder dysfunction. Denies N+V, Headache/unilateral extremity weakness/memory changes/gait disturbance/ other neurologic symptoms. No issues with speech or comprehension.  She continues to follow with Dr. Salinas on 2nd line Brigatinib targeting her ALK rearrangement since late November 2021, tolerating well.  MRI brain w w/o contrast 6/21/2025 no final read available at this time

## 2025-06-26 NOTE — HISTORY OF PRESENT ILLNESS
[FreeTextEntry1] : Ms Fields has a h/o metastatic NSCLC with ALK rearrangement who presented with a single brain metastasis measuring 1.4 cm in size in the right inferior frontal gyrus. She completed gamma knife radiation therapy for a total of 2000 cGy to the right parietal area on 3/13/19. IMRT to RIGHT sacrum 30Gy over 6fxs 4/8-4/15/2025  ONCOLOGY HISTORY She presented to me as a 49 year old female with PMH of rheumatoid arthritis, who presented to [facility] on 2/20 from [facility] with complaints of left sided coolness. She had previously had imaging outside which revealed RUL spiculated mass, sclerotic lesion in T11, as well as 1.4 cm enhancing nodule in insula cortex with vasogenic edema and midline shift. MRI brain from 2/25/19 showed Right inferior frontal gyrus nodule consistent with a primary versus secondary neoplasm, 1.4 X 1.4 X 1.3 cm.  Punctate nodular enhancement along the distal left seventh eighth nerve complex may represent a tiny vestibular schwannoma or leptomeningeal disease. Plan made for SRS.  Pathology from right upper lobe CT guided FNA from 2/26/19 revealed adenocarcinoma, PDL1+ at 5%. She received GK in March of 2019.  She was found to have an ALK mutation and started on Alcensa.    10/2/19- Ms. Fields presents today for follow up. She underwent a brain MRI on 10/1/19 which showed no new disease. Today she is feeling well. She has some bradycardia. Denies headaches, focal weakness, numbness, tingling, nausea, vomiting. Recently back to work. Will have PET scan this coming Friday.  12/4/19- Ms. Fields presents today for follow up. She underwent a brain MRI as an inpatient on 11/27/19 which showed Enhancing lesion in the right frontal operculum just medial to the sylvian fissure is larger when compared to 10/1/2019 but is significantly smaller compared with 3/13/2019 and may represent neoplasm versus post therapy changes. No change tiny enhancing nodule left internal auditory canal. 4.5 mm in AP diameter x 6.2 mm transversely.She notably presented to [facility] on 11/27/19 with 3 episodes of left face, hand, and leg. SHe was discharged to follow up with us. She follows with . Will be seeing Dr. Salinas this week to establish care. Today she denies headaches, focal weakness, nausea, vomiting. She is on Keppra 500 BID. Not on any steroids. Will see Dr. Garcia. She has not had any further feelings of tingling to her left face and left arm. Continues on Alectinib, was dose reduced for a bit of time due to bradycardia.  Denies trouble with balance. Intermittently has a slight sensation, indescribable, to the left side of her face around her mouth, which she has previously experienced when starting Alectinib.   1/15/20- Ms. Fields presents today for follow up. She underwent an MRI with spectroscopy and perfusion this afternoon. This  CT CAP 1/7/20 showed Spiculated appearing mass in the right upper lobe, consistent with patient's  known lung cancer. Scattered sclerotic osseous lesions may represent metastatic disease. These are likely progressed from prior.  Today she is feeling well. Continues on Alectinib. Denies headaches, confusion, nausea, vomiting, No further seizures. Now on briviact for seizure management. Now following with neuro oncologist Dr. Karis Reyes.   MRI interpreted as:  Impression: Enhancement in the right basal ganglia frontal operculum larger compared with 11/27/2019 is suspicious for progression of neoplasm. MR spectroscopy does not demonstrate increased choline to creatine, against tumor recurrence. MR perfusion demonstrates decreased blood pool suggesting post therapy change. Recommend close clinical follow-up.  03/5/2020- Ms. Fields presents today for follow up. She recently was found to have increased LFTs and a new anemia with HGB 6.7. Alectinib was held. MRI brain from 2/27/2020 showed a similar appearing enhancing right subinsular and right frontal opercular lesion. Decreased edema in the larger right subinsular lesion.   PET scan 1/31/2020 showed mildly FDG avid RUL lung mass was decreased in size and metabolism when compared to 10/4/2020. There was resolution of a small mildly FDG avid right paratracheal node. Non FDG avid sclerotic lesions are compatible with treated bone mets. Today she denies headaches, focal weakness, numbness, tingling, nausea vomiting. No seizures. Last dose of steroids was this morning. Notes swelling in her legs has returned recently. Feeling extremely cold and tired.   05/28/20- Ms. Fields is seen today via Telehealth platform in follow up. Verbal consent obtained. PET/CT 05/04/20 noted mildly FDG-avid spiculated right upper lobe lung mass is unchanged on CT, and minimally increased in metabolism as compared to prior study dated 1/31/2020. Etiology is indeterminate. Small focus of residual disease is not excluded. New small hypermetabolic right perihilar and right paratracheal lymph nodes are nonspecific.  Non- FDG-avid sclerotic lesions, unchanged, compatible with treated bone metastases. She saw Dr. Humphreys on 03/20/20 with plans for bone marrow biopsy for her hemolytic anemia. She was discontinued on Alectinib due to anemia and was restarted again by Dr. Silveira. She continues to take  Briviact for seizure.  She saw Dr. Salinas on 05/08/20 with follow up plans in June.MRI brain 5/27/2020 showed Unchanged size of enhancing lesion in the right subinsular region with increased surrounding vasogeni c edema. Continued close interval follow-up is recommended. Today she feels fine. Denies headaches, nausea,  dizziness, vision or auditory changes, no weakness, numbness or tingling. On Alectinib.   8/4/20 She presents for follow up 7/30/20 MRI showed abnormal enhancing lesion with surrounding edema is again identified though decreased size and surrounding edema seen when compared prior exam.  Today she feels mostly well. Denies headaches, nausea, vomiting. Has not had any further tingling or tickling sensations on her face. Recently stopped her antifungals as they caused her SOB and edema, and this has helped. Continues on alectinib. Bone marrow biopsy for her anemia found to be consistent with histoplasmosis.    11/4/20: She presents for f/u with most recent MRI from 10/30/20 showing  decreasing enhancement and surrounding FLAIR signal abnormality along the inferior right frontal lobe consistent with response to treatment. No new lesions are identified.  3/4/2021- Ms. Fields presents today for follow up.  She continues to follow with Dr. Salinas. Continues on alectinib dose reduced after ANDREW treatment in 12/2020. Has not yet started posaconazole for histoplasmosis.   MR brain 2/26/2021 showed Small focus of enhancement in the right frontal lobe without significant interval change from the most recent exam compatible with a treated metastasis. 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  PET scan 2/12/2021 showed  1. Mildly FDG-avid spiculated right upper lobe nodule is unchanged in size and metabolism, compatible with treated disease. 2. Few new small FDG-avid right axillary lymph nodes are nonspecific, probably reactive. Further evaluation may be performed with ultrasound. Nonspecific small FDG-avid mediastinal and bilateral hilar lymph nodes, unchanged in number and avidity. Right paratracheal node demonstrates new calcification. 3. Probable physiologic FDG activity, bilateral adrenal glands, unchanged. 4. Mild FDG activity associated with fractures in right 11th and 12th ribs, decreased as compared to prior study. Few non-FDG-avid sclerotic lesions, unchanged, compatible with treated bone metastases.  Today she feels very well. Had a headache after her cataract surgery, but none since. No seizures, no focal weakness.  7/15/2021- Ms. Fields presents today for follow up. She was admitted 6/29-7/1 with left arm, left face, and left chest discomfort. EEG normal. MRI head 7/1/2021 showed  1.  No new lesions are identified. 2.  Stable appearance of a previously treated metastatic lesion as described. 3.  Stable 3 mm enhancing lesion in the left internal auditory canal which likely represents a vestibular schwannoma.  Alectinib held upon discharge. discharged on briviact 25mg in AM, 50mg in PM.  She started pemtrexed on 7/9.  Notably PET scan on 6/21/2021 showed  1. Mildly FDG avid spiculated right upper lobe nodule is unchanged in size and metabolism, compatible with treated disease. 2. Nonspecific new mild FDG avidity of bilateral level Ib cervical lymph nodes which have minimally increased in size. Please correlate clinically and with ultrasound for further evaluation as indicated. 3. Interval decrease in size and FDG avidity of nonspecific right axillary lymph nodes. Nonspecific small FDG avid mediastinal and bilateral hilar lymph nodes, unchanged in number and avidity. 4. Probable physiologic FDG activity, bilateral adrenal glands, not significantly changed. 5. Interval further decreased FDG activity associated with fractures in the right 11th-12th ribs. A few non-FDG avid sclerotic lesions, unchanged, compatible with treated bone metastasis. 6. Unchanged mild gastric hypermetabolism. Please correlate clinically for gastritis.  Today she is frustrated regarding recent tightness to her left arm, face, chest, and jaw. She notes this has been worse lately since stopping alectinib and continuing on posaconazole with her new chemotherapy. She does not have this symptom when she does not take posaconazle.   10/14/2021 - Patient presents for followup. Interval MRI on 10/05 shows 2 small enhancing lesions which are new since the prior exam of 7/1/2021 suspicious for metastasis. A 5 mm focus is in the right frontal lobe along the motor strip and a 3 mm focus is in the left frontal lobe in the subcortical white matter of the precentral gyrus.  Recent bone marrow biopsy showed histoplasmosis as well as AdenoCa. Recent body imaging PET from 10/05 showing new or progressive disease at RUL, mediastinum and pelvic bones.  She has had difficulty with tolerating her alectinib and has been on varying doses over the previous year most recently having held it all together until this past week.    Restarted Alectinib last Saturday due to POD.   12/09/2021:  Patient reports today for regular follow up.  Her last MRI was prior to her 10/14 appt, which showed the presence of a 5 mm focus in the right frontal lobe along the motor strip and a 3 mm focus in the left frontal lobe in the subcortical white matter of the precentral gyrus. MRI brain 12/5/21 as per our read shows resolution of these lesions.  Saw Dr. Salians 11/19/21, with Alectinib discontinued d/t concern of hemolytic anemia. Of note, on 12/7/21  recently presented to [facility] ED with hemoptysis, and CTA done negative for PE. Today she reports feeling well. Denies dizziness, nausea, vomiting. Continues to take poscanozole and alunbrig which she feels is elevating her blood pressure and increasing pressure in the head.   2/17/2022: Pt presents for follow-up. Reports occasional dizziness, fatigue, and lightheadedness, but unsure if due to changes in BP medication. Notes the adjustment in medications has helped HA (ongoing for some months). Occasionally has blurred vision when trying to read and write. Continues on Brigatinib, and Posaconazole (histoplasmosis)  5/18/2022- Ms. Fields presents today for follow up.  Brain MRI done 5/13/2022 showed 1. No evidence of recurrent metastatic disease. 2. A punctate enhancing focus and associated susceptibility artifact in the ventral posterior aspect of the right frontal lobe, compatible with sequela of treated metastasis, is stable. 3. 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  Continues on brigatinib.  Notes some pain to crown of head extending from bilateral shoulders over the past few months. shoulders get sore at the end of the day.  8/25/2022- MsSea Fields presents today for follow up. continues to follow with Dr. Salinas. Completed posiconazole in 4/2022. Continues on brigatinib.  CT CAP 7/22/2022 showed New patchy groundglass opacity in the right lower lobe, likely infectious/inflammatory. Enlarging indeterminate mediastinal and hilar lymphadenopathy. Follow-up chest CT in 3 months or as per clinical protocol. Stable spiculated right apical nodule and osteosclerotic metastases. No new disease in the abdomen or pelvis.  [facility] MRI 8/22/2022 showed No evidence of intracranial metastatic disease. Resolution of previously seen precentral gyrus subcentimeter enhancing metastases. A focal area of T2 hyperintensity is seen in the region of the previously seen left-sided enhancing lesion likely representing the sequelae of prior radiation therapy. Continued follow-up is advised. Stable left internal auditory canal vestibular schwannoma. Stable area of enhancement with associated susceptibility artifact within the posterior right frontal lobe compatible with sequela of treated metastasis.  feeling very well overall no headaches, no nausea, no focal weakness, no numbness or tingling.  11/30/2022- Ms. Diamond presents today for follow up. MRI brain done 11/23/2022 showed * STABLE VAGUE ENHANCEMENT, MILD INCREASED T2 SIGNAL, AND HEMOSIDERIN DEPOSITION POSTERIOR SUPERIOR ASPECT RIGHT INSULA AT SITE OF PRIOR TREATED METASTASIS. 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  * NO NEW ENHANCING LESIONS IDENTIFIED.  Continues to follow with Dr. Salinas. continues on brigatinib.  CT CAP 10/21/2022 showed Since 7/22/2022.  Stable right upper lobe spiculated right upper lobe nodule.  Stable mildly enlarged hilar lymph nodes (although increased compared to 4/22/2022).  Stable sclerotic bone lesions. No finding to suggest new or progressive disease.  Overall feeling well. no headaches, no nausea, no focal weakness, no numbness or tingling. sometimes gets muscle pains.   3/30/2023- Ms. Fields presents today for follow up. SHe has contineud to follow with Dr. Salinas. continues on second line brigatinib. Brain MRI and total spine MRI done at request of neuro oncologist done 3/23/2023.   MRI brain 3/23/2023 showed  Similar minimal curvilinear enhancement along the posterior insula (35-92). Similar minimal associated T2 FLAIR signal abnormality. Minimally decreased size of previously seen focus of enhancement in the distal left IAC, currently 2.5 mm, previously 3 mm No new abnormal parenchymal or leptomeningeal enhancement.  MRI total spine 3/23/2023 showed No findings of osseous malignancy. No abnormal cord enhancement or cord signal abnormality.  CT CAP 1/20/2023 showed  Ill-defined nodular opacity in the right upper lobe is unchanged when compared to previous exam. No significant change in the sclerotic focus in the T11 vertebral body when compared to previous exam.  feels well today. no headaches, no nausea, no focal weakness. no longer having back pain.   Visit dated 8/2/2023 Patient returns for routine f/u with images for review. Reports doing well with no new neurological deficits to offer. Continues to follow with Dr. Salinas on 2nd line Brigatinib  MRI brain w w/o contrast 7/24/2023 IMPRESSION: Stable vague enhancement, mild increased T2 signal, and hemosiderin deposition posterior superior aspect right insula at site of prior treated metastasis. Stable 3 mm vestibular schwannoma fundus left IAC. No new enhancing lesions identified.  CT C/A/P 5/5/2023 IMPRESSION: Stable exam. No new disease in the chest, abdomen or pelvis. Unchanged spiculated right apical nodule, sub-4 mm left upper lobe nodules and multiple osteosclerotic lesions.  Visit dated 12/7/2023 Patient returns for routine follow up to include progress check and review of completed cranial images. Denies N/V, HA/unilateral extremity weakness/memory changes/gait disturbance/bowel/bladder dysfunction or other neurologic symptoms. No issues with speech or comprehension. Denies seizures  Continues to follow with Dr. Salinas on 2nd line Brigatinib targeting her ALK rearrangement for as long as it benefits the patient, currently at 180mg PO (per heme/onc notes)  MRI brain w w/o contrast 12/1/2023 IMPRESSION: No new areas of abnormal enhancement. Increase in size of the cortical focus of increased T2 FLAIR signal within the posterior aspect of the left frontal lobe at site of a prior treated metastasis. No associated abnormal enhancement. New 4 x 3 mm region of increased T2 signal posterior left frontal lobe near the vertex with associated decreased T1 signal and without associated enhancement. Attention to these areas on follow-up recommended.   CT C/A/P 12/1/2023  final read not available at time of this entry  VISIT dated 5/8/2024 Patient presents for routine f/u and progress check with cranial images for review. Reports doing well overall w/o any new neurological findings. Baseline muscle aches remain persistent but doesn't limit participation in activity attributes to current treatment regimen. Denies N/V, HA/unilateral extremity weakness/memory changes/gait disturbance/bowel/bladder dysfunction or other neurologic symptoms. No issues with speech or comprehension.  Sees neurologist as well.  Continues to follow with Dr. Salinas on 2nd line Brigatinib targeting her ALK rearrangement since late November 2021.  CT C/A/P 4/2/2024 IMPRESSION: Interval disease stability within the chest, abdomen, and pelvis compared to 12/1/2023: Right apical 1.3 cm irregular lesion and right lower lobe pulmonary micronodule unchanged. Sclerotic osseous lesions unchanged. Similar-appearing thickening of the endometrium.  MRI brain w w/o contrast 5/2/2024 IMPRESSION: 1. No acute intracranial abnormality. No findings suspicious for new intracranial metastases. 2. Unchanged left vestibular schwannoma. 3. Mild interval progression of overall very mild white matter disease which may be the sequelae of treatment effect, chronic microvascular ischemic disease, vasculitis, chronic migraines, and/or Lyme disease among other possibilities. 4. Nonspecific progression of an ovoid T1 hypointense focus in the medial left parietal lobe. Attention on follow-up exams is advised.   VISIT DATED: 8/29/2024 Patient returns for progress check and evaluation. Cranial images have been completed as recommended to further assess and r/o POD which was of concern on MRI in May 2024 Continues to follow with Dr. Salinas on 2nd line Brigatinib 180mg daily targeting her ALK rearrangement since late November 2021. Patient denies headache, dizziness, nausea or vomiting, vision changes and has stable gait. She follow up with Dr. Bernard on 8/16/24 who informed her that there is no changes and everything is stable. MRI on 8/15/24, awaiting final report.  CT C/A/P w w/o contrast 8/10/2024 IMPRESSION: Stable exam, with unchanged 1.2 cm right apical nodule, punctate posterior right lower lobe nodule and osteosclerotic lesions. Unchanged indeterminate endometrial thickening. No new disease in the chest, abdomen or pelvis.  MRI brain with SPECT 8/15/2024 final read pending  VISIT DATED: 2/26/2025 Ms. Fields presents for routine f/u and progress check with cranial images for review. States doing well overall but is a little upset today after a misunderstanding with her spouse. Notes mild shoulder discomfort which varies in intensity with temperature changes, but she has not need to utilize medications. Denies N/V, HA/RIGHT side weakness/memory changes/gait disturbance/bowel/bladder dysfunction or other neurologic symptoms. No issues with speech or comprehension.  Follows with neuro/onc @ [place] Continues to follow with Dr. Salinas on 2nd line Brigatinib targeting her ALK rearrangement since late November 2021. Last PETct 11/2024 with Interval increased size of mildly avid RIGHT sclerotic sacral lesion, previously was nonavid. Other are non-FDG avid sclerotic lesions compatible for treated bone metastases are stable. Otherwise, stable findings. Plans for CT C/A/P scheduled for 3/8/2025.   MRI brain w w/o contrast 2/15/2025 IMPRESSION: Interval mild increase in 1.5 cm T2 FLAIR hyperintense, non-enhancing lesion in the left medial precentral gyrus compared with 8/15/2024. Additional T2 FLAIR hyperintense, nonenhancing lesions in the right medial frontoparietal white matter and the bilateral precentral gyri are not significantly changed compared to 8/15/2024 No new enhancing lesions identified.  CT C/A/P 3/8/25 IMPRESSION: Enlarging right sacral osteosclerotic lesion (corresponding to the FDG avidity noted on recent PET/CT). Other osteosclerotic metastases appear grossly unchanged. Stable 1.6 cm right apical nodule and areas of mild interlobular septal thickening in the mid to lower lungs. Unchanged thickening of the adrenal glands. Unchanged endometrial thickening.   3/17/25: Presents for follow-up visit to discuss enlarging right sacral osteosclerotic lesion.  She saw Dr. Alicia for discussion of biopsy vs resxn of lesion, was ordered for MRI with Quicktome protocol with spectroscopy and perfusion scheduled for 3/22/2025.  Continues to follow with Dr. Salinas on 2nd line Brigatinib targeting her ALK rearrangement since late November 2021, tolerating.  She reports feeling well, no headache, no n/v, no diplopia, no neuro symptoms. She also denies back pain other than the chronic left upper back/neck muscle tension. No leg weakness, no numbness/tingling. She has no sacral pain, numbness or weakness in her lower extremities.   VISIT DATED: 4/2/2025 Ms. Fields returns for continuation of care with MR spect/perfusion to r/o viable disease vs treatment changes to guide next steps in her care. Awaiting date for RT to sacrum Continues to follow with Dr. Salinas on 2nd line Brigatinib targeting her ALK rearrangement since late November 2021, tolerating.   MRI brain with SPECT/PERFUSION 3/24/2025 IMPRESSION: -T2/FLAIR hyperintense lesion involving the left sensorimotor region is grossly stable since MRI 2/15/2025. Spectroscopy and perfusion evaluation favors benign entity/posttreatment changes or low-grade neoplasm over active metastasis/high-grade neoplasm. -Multiple additional similar but smaller lesions are also grossly stable throughout the bilateral cerebral hemispheres. -0.3 cm left IAC enhancing lesion is suspicious for a schwannoma, stable.  VISIT DATED: 6/26/2025 Ms. Fields presents for progress evaluation she is now s/p IMRT to RIGHT sacrum 30Gy over 6 fxs 4/8-4/15/2025. States she is doing well w/o signs of bowel/bladder dysfunction. Denies N+V, Headache/unilateral extremity weakness/memory changes/gait disturbance/ other neurologic symptoms. No issues with speech or comprehension.  She continues to follow with Dr. Salinas on 2nd line Brigatinib targeting her ALK rearrangement since late November 2021, tolerating well.  MRI brain w w/o contrast 6/21/2025 no final read available at this time

## 2025-06-26 NOTE — REVIEW OF SYSTEMS
[Shortness Of Breath] : no shortness of breath [Confused] : no confusion [Dizziness] : no dizziness [Fainting] : no fainting [Difficulty Walking] : no difficulty walking [FreeTextEntry3] : right eye blurry vision attributes to cataract (had left cataract sx) [de-identified] : Denies Headache/gait disturbance

## 2025-06-26 NOTE — REVIEW OF SYSTEMS
[Shortness Of Breath] : no shortness of breath [Confused] : no confusion [Dizziness] : no dizziness [Fainting] : no fainting [Difficulty Walking] : no difficulty walking [FreeTextEntry3] : right eye blurry vision attributes to cataract (had left cataract sx) [de-identified] : Denies Headache/gait disturbance

## 2025-06-26 NOTE — REVIEW OF SYSTEMS
[Shortness Of Breath] : no shortness of breath [Confused] : no confusion [Dizziness] : no dizziness [Fainting] : no fainting [Difficulty Walking] : no difficulty walking [FreeTextEntry3] : right eye blurry vision attributes to cataract (had left cataract sx) [de-identified] : Denies Headache/gait disturbance

## 2025-07-25 NOTE — PHYSICAL EXAM
[Fully active, able to carry on all pre-disease performance without restriction] : Status 0 - Fully active, able to carry on all pre-disease performance without restriction [Normal] : affect appropriate [de-identified] : No icterus  [de-identified] : No LAD [de-identified] : Clear [de-identified] : S1 S2 [de-identified] : No edema [de-identified] : No spine/CVA tenderness [de-identified] : Ambulatory

## 2025-07-25 NOTE — RESULTS/DATA
[FreeTextEntry1] : -PET/CT 10/4/19:  Right upper lobe mass, mediastinal and hilar lymph nodes appear significantly smaller and less intense than prior PET/CT from March 2019 and essentially unchanged from CT from June 2019.  There is a 5 mm right middle lobe nodule appearing more prominent than the prior study.  -CT Head 11/26/19:  No acute intracranial hemorrhage.  Vasogenic edema in the right frontoparietal region in area of known metastasis as seen on MR 10/1/2019.   -MRI Brain 11/27/19:  Enhancing lesion in the right frontal operculum just medial to the sylvian fissure is larger when compared to 10/1/2019 but is significantly smaller compared with 3/13/2019 and may represent neoplasm versus post therapy changes. No change tiny enhancing nodule left internal auditory canal.   -CT C/A/P 1/7/20:  Spiculated appearing mass in the right upper lobe, consistent with patient's known lung cancer. Comparison is made to a prior CT scan of the abdomen and pelvis which was performed on 2/18/2019. The sclerotic lesions in the left iliac bone were not seen on the prior exam. The sclerotic lesion in the right sacrum is also new. The lesion in the right iliac bone was seen previously.  The sclerotic lesion in the T11 vertebral body has significantly increased in size compared to the prior exam. These findings are concerning for progression of osseous metastasis.   -PET/CT 1/31/20:  Abnormal FDG-PET/CT scan.  1. Mildly FDG-avid spiculated right upper lobe lung mass is decreased in size and metabolism as compared to PET/CT dated 10/4/2019, and is not significantly changed as compared to CT dated 1/7/2020.  2. Resolution of small, mildly FDG-avid right paratracheal lymph node.  3. Non-FDG-avid sclerotic lesions, unchanged as compared to prior PET/CT, and interval diagnostic CT, are compatible with treated bone metastases.   -MRI Brain 2/27/20:  Similar-appearing enhancing right subinsular and right frontal opercular  lesions as detailed in the body the report. Decreased edema surrounding the larger right subinsular lesion. No abnormal leptomeningeal enhancement   -PET/CT 5/4/20:  Abnormal FDG-PET/CT scan.  1. Mildly FDG-avid spiculated right upper lobe lung mass is unchanged on CT, and minimally increased in metabolism as compared to prior study dated 1/31/2020. Etiology is indeterminate. Small focus of residual disease is not excluded.  2. New small hypermetabolic right perihilar and right paratracheal lymph nodes are nonspecific.  3. Non-FDG-avid sclerotic lesions, unchanged, compatible with treated bone metastases.   -MRI Brain 5/27/20:  Unchanged size of enhancing lesion in the right subinsular region with increased surrounding vasogenic edema. Continued close interval follow-up is recommended.   -MRI Brain 7/30/20:  Abnormal enhancing lesion with surrounding edema is again identified though decreased size and surrounding edema seen when compared prior exam. This finding could be compatible with response to therapy though continued close interval follow-up until resolution is recommended.   -PET/CT 8/10/20:  Compared to FDG-PET/CT scan dated 5/4/2020:  1. Mildly FDG-avid spiculated right upper lobe lung mass is unchanged in size and metabolism.  2. Nonspecific small FDG-avid mediastinal and bilateral hilar lymph nodes are unchanged or decreased in metabolism.  3. New approximately symmetric FDG activity in bilateral adrenal glands, without new corresponding abnormalities on CT, may be physiologic.  4. Non-FDG-avid sclerotic lesions, unchanged, compatible with treated bone metastases.   -CT Head 10/26/20:  No acute intracranial hemorrhage, mass effect, or evidence of acute vascular territorial infarction.  Previously described right frontal subinsular lesion is not well visualized on this examination.  More sensitive evaluation for intracranial metastatic disease, with contrast-enhanced brain MRI may be obtained, as clinically warranted, if no contraindications exist.  -MRI Brain 10/30/20:  Decreasing enhancement and surrounding FLAIR signal abnormality along the inferior right frontal lobe consistent with response to treatment. No new lesions are identified.  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    -PET/CT 11/13/20:  Compared to FDG-PET/CT scan dated 8/10/2020: 1. Mildly FDG-avid spiculated right upper lobe nodule is unchanged in size and metabolism. 2. Nonspecific small FDG-avid mediastinal and bilateral hilar lymph nodes, unchanged. 3. Mild, symmetric FDG activity in bilateral adrenal glands, without corresponding abnormalities on CT, unchanged likely physiologic. 4. New FDG-avid fractures, posterior aspect right 11th and 12th ribs. Correlate clinically for history of trauma. Few non-FDG-avid sclerotic lesions, unchanged, compatible with treated bone metastases.  -Abdominal U/S 12/4/20:  Etiology of patient's elevated LFTs is not elucidated on this study.  No hydronephrosis.  -PET/CT 2/12/21:  Compared to FDG-PET/CT scan dated 11/13/2020: 1. Mildly FDG-avid spiculated right upper lobe nodule is unchanged in size and metabolism, compatible with treated disease. 2. Few new small FDG-avid right axillary lymph nodes are nonspecific, probably reactive. Further evaluation may be performed with ultrasound. Nonspecific small FDG-avid mediastinal and bilateral hilar lymph nodes, unchanged in number and avidity. Right paratracheal node demonstrates new calcification. 3. Probable physiologic FDG activity, bilateral adrenal glands, unchanged. 4. Mild FDG activity associated with fractures in right 11th and 12th ribs, decreased as compared to prior study. Few non-FDG-avid sclerotic lesions, unchanged, compatible with treated bone metastases.  -MRI Brain 2/25/21:  Small focus of enhancement in the right frontal lobe without significant interval change from the most recent exam compatible with a treated metastasis.  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  -Pet/CT 6/21/21:  Compared to FDG-PET/CT scan dated 2/12/2021: 1. Mildly FDG avid spiculated right upper lobe nodule is unchanged in size and metabolism, compatible with treated disease. 2. Nonspecific new mild FDG avidity of bilateral level Ib cervical lymph nodes which have minimally increased in size. Please correlate clinically and with ultrasound for further evaluation as indicated. 3. Interval decrease in size and FDG avidity of nonspecific right axillary lymph nodes. Nonspecific small FDG avid mediastinal and bilateral hilar lymph nodes, unchanged in number and avidity. 4. Probable physiologic FDG activity, bilateral adrenal glands, not significantly changed. 5. Interval further decreased FDG activity associated with fractures in the right 11th-12th ribs. A few non-FDG avid sclerotic lesions, unchanged, compatible with treated bone metastasis. 6. Unchanged mild gastric hypermetabolism. Please correlate clinically for gastritis.  -EKG 6/25/21:  NSR with occasional PVC's   -MRI Brain 7/1/21: No new lesions are identified. Stable appearance of a previously treated metastatic lesion as described. Stable 3 mm enhancing lesion in the left internal auditory canal which likely represents a vestibular schwannoma.  -MRI C-spine 8/2/2021: Degenerative changes of the cervical spine with moderate central canal stenosis at C5-6.  No definite cord signal abnormality.  No abnormal enhancement or mass lesion seen.  -PET/CT 10/5/21:  Compared to FDG-PET/CT scan 6/21/2021: Findings suggestive of progression of disease. 1. Increased FDG avidity and unchanged size of spiculated right upper lobe pulmonary nodule. 2. New or increased FDG avid mediastinal and right hilar lymph nodes. 3. New FDG avidity associated with several unchanged small lytic and small sclerotic lesions in the pelvic bones. 4. Unchanged mild symmetrical FDG activity in bilateral adrenal glands, probably physiologic. 5. Unchanged mild gastric hypermetabolism, probably inflammatory. Please correlate clinically. 6. Unchanged nonspecific, mild fairly symmetric prominent FDG activity in the tonsillar regions, possibly inflammatory. Please correlate with direct visualization. Unchanged mildly FDG avid bilateral level Ib cervical lymph nodes, possibly reactive.  -MRI Brain 10/5/21:  There are 2 small enhancing lesions which are new since the prior exam of 7/1/2021 suspicious for metastasis. A 5 mm focus is in the right frontal lobe along the motor strip and a 3 mm focus is in the left frontal lobe in the subcortical white matter of the precentral gyrus.  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  -US Pelvis 11/3/21:  Nodular echogenic focus within the endometrium suspicious for polyp.  Unremarkable appearance of the ovaries  -[facility] 11/19/21:  No alterations identified.    -MRI Brain 12/5/21:  Abnormal enhancing lesions are again seen and slightly small in size which could be due to response to therapy. Continued close interval follow-up is recommended.  -CT Chest Angio 12/7/21 (COMPARED TO CT 1/7/20 AND NOT INTERVAL PET/CT'S):  No pulmonary embolus.  Right upper lobe mass similar to 01/07/2020.  Question of new metastasis to the right hilum.  Apparent skeletal metastases unchanged  -CT C/A/P 1/26/22:  Decreased size of a neoplasm in the right upper lobe.  Stable bone metastases. No finding to suggest new or progressive disease.  -Nuc Med Bone Scan 1/26/22:  Bone scan demonstrates:  No scan abnormality corresponding to sclerotic foci in the spine and pelvis.  FDG avid foci in the pelvis seen on prior PET/CT are not identified.  Degenerative disease in the major joints.  -MRI Brain 2/15/22:  Resolution of previously seen tiny metastatic foci within the precentral gyri.  Small enhancing focus within the right frontal operculum compatible with the sequelae of a treated metastasis, unchanged from the prior exams.  Stable left intracanalicular vestibular schwannoma.  -CT C/A/P 4/22/22:  Stable examination when compared to 1/26/2022, in particular stable spiculated mass in the right upper lobe at 1.5 cm.  Non specific 6 mm right lower paratracheal lymph node, attention on follow-up recommended.  Stable bony lesions concerning for metastatic disease.  No new metastatic disease.  -MRI Brain 5/14/22:   1. No evidence of recurrent metastatic disease. 2. A punctate enhancing focus and associated susceptibility artifact in the ventral posterior aspect of the right frontal lobe, compatible with sequela of treated metastasis, is stable. 3. 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  -CT C/A/P 7/22/22:  New patchy groundglass opacity in the right lower lobe, likely infectious/inflammatory. Enlarging indeterminate mediastinal and hilar lymphadenopathy. Follow-up chest CT in 3 months or as per clinical protocol.  Stable spiculated right apical nodule and osteosclerotic metastases.  No new disease in the abdomen or pelvis.  -[facility] 7/29/22:  No alterations identified.    -MRI Brain 8/19/22:  No evidence of intracranial metastatic disease. Resolution of previously seen precentral gyrus subcentimeter enhancing metastases. A focal area of T2 hyperintensity is seen in the region of the previously seen left-sided enhancing lesion likely representing the sequelae of prior radiation therapy. Continued follow-up is advised.  Stable left internal auditory canal vestibular schwannoma.  Stable area of enhancement with associated susceptibility artifact within the posterior right frontal lobe compatible with sequela of treated metastasis.  -CT C/A/P 10/21/22:  Since 7/22/2022:  Stable right upper lobe spiculated right upper lobe nodule.  Stable mildly enlarged hilar lymph nodes (although increased compared to 4/22/2022).  Stable sclerotic bone lesions.  No finding to suggest new or progressive disease.  -MRI Brain 11/23/22:   * STABLE VAGUE ENHANCEMENT, MILD INCREASED T2 SIGNAL, AND HEMOSIDERIN DEPOSITION POSTERIOR SUPERIOR ASPECT RIGHT INSULA AT SITE OF PRIOR TREATED METASTASIS. 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 * NO NEW ENHANCING LESIONS IDENTIFIED.  -CT C/A/P 1/20/23:  Ill-defined nodular opacity in the right upper lobe is unchanged when compared to previous exam.  No significant change in the sclerotic focus in the T11 vertebral body when compared to previous exam.  -MRI C/T-Spine 3/23/23:  No findings of osseous malignancy. No abnormal cord enhancement or cord signal abnormality.  -MRI L-Spine 3/23/23:  No findings of osseous or leptomeningeal malignancy.  -MRI Brain 3/23/23:   Similar minimal curvilinear enhancement along the posterior insula ([id number]). Similar minimal associated T2 FLAIR signal abnormality.  Minimally decreased size of previously seen focus of enhancement in the distal left IAC, currently 2.5 mm, previously 3 mm.  No new abnormal parenchymal or leptomeningeal enhancement.  -CT C/A/P 5/5/23:  Stable exam. No new disease in the chest, abdomen or pelvis.  Unchanged spiculated right apical nodule, sub-4 mm left upper lobe nodules and multiple osteosclerotic lesions.  -MRI Brain 7/24/23:  Stable vague enhancement, mild increased T2 signal, and hemosiderin deposition posterior superior aspect right insula at site of prior treated metastasis. Stable 3 mm vestibular schwannoma fundus left IAC. No new enhancing lesions identified.  -CT C/A/P 8/18/23:  Stable exam. Unchanged 1.1 cm right apical nodule, few scattered bilateral sub-4 mm pulmonary nodules, and osteosclerotic lesions.  -MRI Brain 12/1/23:  No new areas of abnormal enhancement. Increase in size of the cortical focus of increased T2 FLAIR signal within the posterior aspect of the left frontal lobe at site of a prior treated metastasis. No associated abnormal enhancement. New 4 x 3 mm region of increased T2 signal posterior left frontal lobe near the vertex with associated decreased T1 signal and without associated enhancement. Attention to these areas on follow-up recommended.  -CT C/A/P 12/1/23:  Stable 1.2 cm right apical spiculated nodular density with stable additional less than 0.4 cm nodules. No new or enlarging pulmonary nodules or consolidations. Stable appearance of sclerotic lesions throughout the visualized osseous structures. No new evidence of metastatic disease. Abnormal appearing endometrium in a patient postmenopausal, recommend pelvic ultrasound.  -TV U/S 1/3/24:  Thickened, cystic endometrium measuring up to 1.0 cm. Gynecologic consultation for tissue sampling is recommended.  -Screening Mammo/Sono 1/25/24:  No mammographic or sonographic evidence of malignancy.  -CT C/A/P 4/2/24:  Interval disease stability within the chest, abdomen, and pelvis compared to 12/1/2023:  Right apical 1.3 cm irregular lesion and right lower lobe pulmonary micronodule unchanged. Sclerotic osseous lesions unchanged. Similar-appearing thickening of the endometrium.  -MRI Brain 5/2/24:   1. No acute intracranial abnormality. No findings suspicious for new intracranial metastases. 2. Unchanged left vestibular schwannoma. 3. Mild interval progression of overall very mild white matter disease which may be the sequelae of treatment effect, chronic microvascular ischemic disease, vasculitis, chronic migraines, and/or Lyme disease among other possibilities. 4. Nonspecific progression of an ovoid T1 hypointense focus in the medial left parietal lobe. Attention on follow-up exams is advised.  -CT CAP 8/10/24:  Stable exam, with unchanged 1.2 cm right apical nodule, punctate posterior right lower lobe nodule and osteosclerotic lesions. Unchanged indeterminate endometrial thickening. No new disease in the chest, abdomen or pelvis.  -MRI head w/wo con 8/15/24:  Minimal increase in size of multiple FLAIR hyperintense, nonenhancing lesions in the bilateral precentral gyri and right medial frontoparietal white matter. Spectroscopy evaluation of the left medial precentral gyrus lesion is nonspecific. Lesions that are amenable to perfusion evaluation demonstrate no hyperperfusion. Evolving posttreatment changes are considered but continued follow-up is recommended.  -PET/CT 11/22/24:   1. Since 6/21/2021 FDG PET/CT, no significant interval change of the spiculated 1.8 cm right upper lobe lung nodule demonstrating mild FDG activity. No new hypermetabolic foci in the thorax. 2. Interval increased size of mildly avid RIGHT sclerotic sacral lesion, previously was nonavid. Other are non-FDG avid sclerotic lesions compatible for treated bone metastases are stable. 3. Persistent mild nonspecific activity within bilateral 1B cervical lymph nodes, may be inflammatory. 4. Stable nonspecific mildly avid mediastinal and bilateral hilar lymph nodes. Resolution of previously noted right hilar lymph noted activity. 5. Stable likely physiologic activity of bilateral adrenal glands. 6. Previously noted mild gastric hypermetabolism not visualized.  - MR brain w/wo contrast 2/15/25: Interval mild increase in 1.5 cm T2 FLAIR hyperintense, nonenhancing lesion in the left medial precentral gyrus compared with 8/15/2024. Additional T2 FLAIR hyperintense, nonenhancing lesions in the right medial frontoparietal white matter and the bilateral precentral gyri are not significantly changed compared to 8/15/2024. No new enhancing lesions identified.  - CT C/A/P with contrast 3/8/25: Enlarging right sacral osteosclerotic lesion (corresponding to the FDG avidity noted on recent PET/CT). Other osteosclerotic metastases appear grossly unchanged. Stable 1.6 cm right apical nodule and areas of mild interlobular septal thickening in the mid to lower lungs. Unchanged thickening of the adrenal glands. Unchanged endometrial thickening.  -MRI Brain Spect 3/24/25:  T2/FLAIR hyperintense lesion involving the left sensorimotor region is grossly stable since MRI 2/15/2025. Spectroscopy and perfusion evaluation favors benign entity/posttreatment changes or low-grade neoplasm over active metastasis/high-grade neoplasm. Multiple additional similar but smaller lesions are also grossly stable throughout the bilateral cerebral hemispheres. A 0.3 cm left IAC enhancing lesion is suspicious for a schwannoma, stable.  -Mammo/Sono 3/29/25:  No mammographic or sonographic evidence of malignancy.  Images Reviewed/Interpreted:  -MRI Brain 6/21/25:  Scattered T2/FLAIR hyperintense, nonenhancing lesions seen in the bilateral cerebral hemispheres measuring up to 1.6 cm as seen in the left sensory motor gyri, which appear unchanged from prior brain MRI on 3/24/2025. Unchanged 0.3 cm left IAC enhancing lesion, most suggestive of a vestibular schwannoma.  -CT CAP 7/19/25:  Since March 2025: Stable 1.6 cm right apical nodule. Resolved mid to lower lung septal thickening and peripheral groundglass opacity noted on the prior exam. Enlarging T11 osteosclerotic metastasis; other osseous lesions unchanged.

## 2025-07-25 NOTE — HISTORY OF PRESENT ILLNESS
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  She presented with symptomatic brain metastasis and was found to have a RUL primary tumor with intrathoracic LN, bone metastases, liver metastasis and brain metastasis.  Biopsy of the primary tumor in late Feb 2019 revealed her diagnosis.  She was treated with SRS to the brain in March 2019 with nice response.  She was started on first line Alectinib in April 2019 and achieved [place].    Patient is status post admission at [facility] from 11/27-12/4/2020 for treatment of disseminated histoplasmosis.  She was started on AmBisome with a plan to insert a PICC line however one of her blood cultures grew multiple organisms delaying the PICC line placement.  Subsequent fungal and bacterial cultures were negative.  During her hospital stay, she developed ANDREW which was felt to be secondary to the AmBisome however her alectinib was held during the hospital stay.  Abdominal ultrasound was unremarkable.  She was transfused 1 unit PRBCs prior to discharge.  She was discharged with plan to treat the histoplasmosis with oral posaconazole in the outpatient setting.  Patient has subsequently been on treatment with dose-reduced Alectinib 450mg BID since late December 2020 due to renal function.   She started Posaconazole in mid-March with the Alectinib.  Alectinib was stopped in late June 2021 due to poor tolerability with the combination.   Patient was started on Pemetrexed chemotherapy in July 2021 in effort to maintain control of her lung cancer while preserving sensitivity to ALK inhibitors and allowing her to be treated with Posaconazole for the disseminated histo.  She received 1 cycle of chemotherapy with Pemetrexed that was very poorly tolerated.  She completed roughly 1 year of Posaconazole in late April 2022.   She had repeat BM Bx in Sept 2021 showing evidence of persistent histoplasmosis and was continued on Posaconazole. Restaging PET/CT in early October with evidence of disease progression.  She restarted Alectinib 450mg BID on 10/9/21.   MRI Brain on 10/5/21 revealed two new small metastases; she had f/u with Rad-Onc and given her asymptomatic status coupled with restarting the Alectinib, the plan was to do a short-term f/u MRI at a 2 month interval in early December.    Developed return of hemolysis in November 2021.  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  Alectinib discontinued in mid-November 2021.   Started 2nd line Brigatinib in November 2021; achieved [place].  [facility] testing in July 2022 without any alterations.   Patient's case was reviewed at thoracic tumor board in December 2024: There is felt to be no clear role for the inclusion of consolidative thoracic RT at this juncture. Treated with RT to region of oligo-progression in sacrum completed April 2025.  [de-identified] : -Lung, RUL CT-guided FNA 2/26/19:  Positive for malignant cells.  Adenocarcinoma.  PDL1 positive at 5%.  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  [de-identified] : Patient continues on second line systemic therapy with brigatinib targeting her EML4-ALK fusion since Nov 2021; achieved [place].   Treated with RT to region of oligo-progression in sacrum completed April 2025.  Patient presents today for f/u.  Denies significant side effects.  No F/C/N/V/D or constipation. Reports feeling quite well.   No myalgias.  Remains active and working.   Felt improved after RT.   [place].   PCP increased metformin to BID dosing and started low dose amlodipine.   Brain MRI from June with sustained intracranial response.  Restaging body CT July 2025 with sustained response.  The T11 finding is likely a treatment effect.